# Patient Record
Sex: MALE | Race: WHITE | NOT HISPANIC OR LATINO | Employment: OTHER | URBAN - NONMETROPOLITAN AREA
[De-identification: names, ages, dates, MRNs, and addresses within clinical notes are randomized per-mention and may not be internally consistent; named-entity substitution may affect disease eponyms.]

---

## 2017-01-26 ENCOUNTER — OFFICE VISIT (OUTPATIENT)
Dept: GASTROENTEROLOGY | Facility: CLINIC | Age: 62
End: 2017-01-26

## 2017-01-26 VITALS
SYSTOLIC BLOOD PRESSURE: 124 MMHG | BODY MASS INDEX: 31.69 KG/M2 | WEIGHT: 234 LBS | HEIGHT: 72 IN | TEMPERATURE: 97.4 F | HEART RATE: 74 BPM | DIASTOLIC BLOOD PRESSURE: 74 MMHG

## 2017-01-26 DIAGNOSIS — Z79.01 ANTICOAGULATED: ICD-10-CM

## 2017-01-26 DIAGNOSIS — K63.5 COLON POLYPS: Primary | ICD-10-CM

## 2017-01-26 DIAGNOSIS — I10 ESSENTIAL HYPERTENSION: ICD-10-CM

## 2017-01-26 PROCEDURE — S0260 H&P FOR SURGERY: HCPCS | Performed by: NURSE PRACTITIONER

## 2017-01-26 NOTE — MR AVS SNAPSHOT
Lio Velasquez   1/26/2017 2:00 PM   Office Visit    Dept Phone:  [phone]   Encounter #:  [id number]    Provider:  RODGER Garnett   Department:  [facility] GASTROENTEROLOGY                Your Full Care Plan              Today's Medication Changes          These changes are accurate as of: 1/26/17  3:05 PM.  If you have any questions, ask your nurse or doctor.               New Medication(s)Ordered:     Sod Picosulfate-Mag Ox-Cit Acd 10-3.5-12 MG-GM-GM pack   Commonly known as:  PREPOPIK   Take as directed   Started by:  RODGER Garnett            Where to Get Your Medications      These medications were sent to [facility] [address] [facility] - [phone]  - [phone] [place] [address]     Phone:  [phone]     Sod Picosulfate-Mag Ox-Cit Acd 10-3.5-12 MG-GM-GM pack                  Your Updated Medication List          This list is accurate as of: 1/26/17  3:05 PM.  Always use your most recent med list.                aspirin 81 MG tablet       celecoxib 200 MG capsule   Commonly known as:  CeleBREX       * CENTRUM CARDIO PO       * MULTIVITAMIN ADULT PO       clopidogrel 75 MG tablet   Commonly known as:  PLAVIX       FOLTX 2.5-25-2 MG tablet tablet   Generic drug:  folic acid-pyridoxine-cyanocobalamin       metoprolol succinate XL 50 MG 24 hr tablet   Commonly known as:  TOPROL-XL       Omega 3 1000 MG capsule       ramipril 2.5 MG capsule   Commonly known as:  ALTACE       Sod Picosulfate-Mag Ox-Cit Acd 10-3.5-12 MG-GM-GM pack   Commonly known as:  PREPOPIK   Take as directed       TRINTELLIX 20 MG tablet   Generic drug:  Vortioxetine HBr       Unable to find       ZOCOR 40 MG tablet   Generic drug:  simvastatin       * Notice:  This list has 2 medication(s) that are the same as other medications prescribed for you. Read the directions carefully, and ask your doctor or other care provider to  "review them with you.            We Performed the Following     Case request       You Were Diagnosed With        Codes Comments    Colon polyps    -  Primary ICD-10-CM: K63.5  ICD-9-CM: 211.3     Anticoagulated     ICD-10-CM: Z79.01  ICD-9-CM: V58.61     Essential hypertension     ICD-10-CM: I10  ICD-9-CM: 401.9       Instructions     None    Patient Instructions History      Upcoming Appointments     Visit Type Date Time Department    SCOPE SCREENING 2017  2:00 PM [facility] GASTRO [facility]AD    FOLLOW UP 2017  8:45 AM [facility] HEART GROUP PAD      MyChart Signup     [facility] [employer] allows you to send messages to your doctor, view your test results, renew your prescriptions, schedule appointments, and more. To sign up, go to [employer] and click on the Sign Up Now link in the New User? box. Enter your [employer] Activation Code exactly as it appears below along with the last four digits of your Social Security Number and your Date of Birth () to complete the sign-up process. If you do not sign up before the expiration date, you must request a new code.    [employer] Activation Code: [email or web address]  Expires: 2017  3:05 PM    If you have questions, you can email [email or web address] or call [phone] to talk to our [employer] staff. Remember, [employer] is NOT to be used for urgent needs. For medical emergencies, dial 911.               Other Info from Your Visit           Your Appointments     2017  8:45 AM CST   Follow Up with Storm Encinas MD   [facility] GROUP (--)    [address] [place] [address] [address]  [facility] [address] [phone]           Arrive 15 minutes prior to appointment.              Allergies     Demerol [Meperidine]      Morphine And Related        Reason for Visit     Colon Polyps had colon 1230 polyps      Vital Signs     Blood Pressure Pulse Temperature Height Weight Body Mass Index    124/74 74 97.4 °F (36.3 °C) 72\" (182.9 cm) 234 lb (106 kg) " 31.74 kg/m2    Smoking Status                   Never Smoker           Problems and Diagnoses Noted     Colon polyp    -  Primary    Taking blood thinners        High blood pressure

## 2017-01-26 NOTE — PROGRESS NOTES
Chief Complaint   Patient presents with   • Colon Polyps     had colon 12-30-13 polyps     Subjective   HPI    Lio Velasquez is a 61 y.o. male who presents to office for preventative maintenance.  There is  a personal history of colon polyps.  There is not a history of colon cancer.  He does not have complaints of nausea/vomiting, change in bowels, weight loss, no BRBPR, no melena.  There is not a family history of colon cancer.  There is not a family history of colon polyps.  Pt last colonoscopy-12/2013 .  Bowels do move on regular basis.    Past Medical History   Diagnosis Date   • Aortocoronary bypass status    • CAD in native artery    • Chest pain    • Coronary angioplasty status    • Hyperlipidemia      unspecified   • Hypertension, benign    • Obesity    • Sleep apnea      unspecified     Outpatient Prescriptions Marked as Taking for the 1/26/17 encounter (Office Visit) with RODGER Garnett   Medication Sig Dispense Refill   • aspirin 81 MG tablet Take 81 mg by mouth every night.     • clopidogrel (PLAVIX) 75 MG tablet Take 75 mg by mouth every night.     • folic acid-pyridoxine-cyanocobalamin (FOLTX) 2.5-25-2 MG tablet tablet Take 1 tablet by mouth every night.     • metoprolol succinate XL (TOPROL-XL) 50 MG 24 hr tablet Take 50 mg by mouth every night.     • Multiple Vitamins-Minerals (CENTRUM CARDIO PO) Take 2 doses by mouth every night.     • Omega 3 1000 MG capsule Take  by mouth daily.     • ramipril (ALTACE) 2.5 MG capsule Take 2.5 mg by mouth every night.     • simvastatin (ZOCOR) 40 MG tablet Take 40 mg by mouth every night.     • Vortioxetine HBr (TRINTELLIX) 20 MG tablet Take  by mouth daily.       Allergies   Allergen Reactions   • Demerol [Meperidine]    • Morphine And Related      Social History     Social History   • Marital status:      Spouse name: N/A   • Number of children: N/A   • Years of education: N/A     Occupational History   • Not on file.     Social History Main  Topics   • Smoking status: Never Smoker   • Smokeless tobacco: Not on file   • Alcohol use Yes      Comment: occasional   • Drug use: No   • Sexual activity: Not on file     Other Topics Concern   • Not on file     Social History Narrative     Family History   Problem Relation Age of Onset   • Diabetes Mother      type II   • Heart failure Mother      congestive heart failure   • Colon polyps Neg Hx    • Esophageal cancer Neg Hx    • Rectal cancer Neg Hx      Review of Systems   Constitutional: Negative for fatigue, fever and unexpected weight change.   HENT: Negative for hearing loss, sore throat and voice change.    Eyes: Negative for visual disturbance.   Respiratory: Negative for cough, shortness of breath and wheezing.    Cardiovascular: Negative for chest pain and palpitations.   Gastrointestinal: Negative for abdominal pain, blood in stool and vomiting.   Endocrine: Negative for polydipsia and polyuria.   Genitourinary: Negative for difficulty urinating, dysuria, hematuria and urgency.   Musculoskeletal: Negative for joint swelling and myalgias.   Skin: Negative for color change, rash and wound.   Neurological: Negative for dizziness, tremors, seizures and syncope.   Hematological: Does not bruise/bleed easily.   Psychiatric/Behavioral: Negative for agitation and confusion. The patient is not nervous/anxious.      Objective   Vitals:    01/26/17 1419   BP: 124/74   Pulse: 74   Temp: 97.4 °F (36.3 °C)     Physical Exam   Constitutional: He is oriented to person, place, and time. He appears well-developed and well-nourished.   HENT:   Head: Normocephalic and atraumatic.   Eyes:   Pink, Nonicteric   Neck:   Global Assessment- supple. No JVD or lymphadenopathy   Cardiovascular: Normal rate, regular rhythm and normal heart sounds.  Exam reveals no gallop and no friction rub.    No murmur heard.  Pulmonary/Chest: Effort normal and breath sounds normal. No respiratory distress. He has no wheezes. He has no rales.    Inspection: Movements-Symmetrical   Abdominal: Soft. Bowel sounds are normal. He exhibits no distension and no mass. There is no tenderness. There is no rebound and no guarding.   Neurological: He is alert and oriented to person, place, and time.   General Exam-Deemed a reliable historian, able to converse without difficulty and Able to move all extremities without difficulty     Imaging Results (most recent)     None        Assessment/Plan   Lio was seen today for colon polyps.    Diagnoses and all orders for this visit:    Colon polyps  -     Case request  -     Sod Picosulfate-Mag Ox-Cit Acd (PREPOPIK) 10-3.5-12 MG-GM-GM pack; Take as directed    Anticoagulated    Essential hypertension      COLONOSCOPY WITH ANESTHESIA (N/A)      All risks, benefits, alternatives, and indications of colonoscopy procedure have been discussed with the patient. Risks to include perforation of the colon requiring possible surgery or colostomy, risk of bleeding from biopsies or removal of colon tissue, possibility of missing a colon polyp or cancer, or adverse drug reaction.  Benefits to include the diagnosis and management of disease of the colon and rectum. Alternatives to include barium enema, radiographic evaluation, lab testing or no intervention. Pt verbalizes understanding and agrees.     Patient is to hold their anticoagulation medication per the direction of their prescribing physician, Dr Encinas. This is to prevent any risk or complication from bleeding intra and post procedure. If they develop bleeding post procedure they are to go the emergency department for further evaluation and treatment immediately    Doubtful pt will be able to stop Plavix as he had a new cardiac stent placed in July 2016.      There are no Patient Instructions on file for this visit.     2/12/17    Pt unable to stop antiplatelet therapy due to BRIANNA in July 2016 per Dr Encinas.  Will place pt in recall for scope to be performed in 9/17

## 2017-01-26 NOTE — LETTER
January 26, 2017     Bridger Rosales MD  [address] [place] [address] [id number]  [place] [address]    Patient: Lio Velasquez   YOB: 1955   Date of Visit: 1/26/2017       Dear Dr. Bobby MD:    Lio Velasquez was in my office today. Below is a copy of my note.    If you have questions, please do not hesitate to call me. I look forward to following Lio along with you.         Sincerely,        RODGER Garnett        CC: No Recipients    Chief Complaint   Patient presents with   • Colon Polyps     had colon 12-30-13 polyps     Subjective   HPI    Lio Velasquez is a 61 y.o. male who presents to office for preventative maintenance.  There is  a personal history of colon polyps.  There is not a history of colon cancer.  He does not have complaints of nausea/vomiting, change in bowels, weight loss, no BRBPR, no melena.  There is not a family history of colon cancer.  There is not a family history of colon polyps.  Pt last colonoscopy-12/2013 .  Bowels do move on regular basis.    Past Medical History   Diagnosis Date   • Aortocoronary bypass status    • CAD in native artery    • Chest pain    • Coronary angioplasty status    • Hyperlipidemia      unspecified   • Hypertension, benign    • Obesity    • Sleep apnea      unspecified     Outpatient Prescriptions Marked as Taking for the 1/26/17 encounter (Office Visit) with RODGER Garnett   Medication Sig Dispense Refill   • aspirin 81 MG tablet Take 81 mg by mouth every night.     • clopidogrel (PLAVIX) 75 MG tablet Take 75 mg by mouth every night.     • folic acid-pyridoxine-cyanocobalamin (FOLTX) 2.5-25-2 MG tablet tablet Take 1 tablet by mouth every night.     • metoprolol succinate XL (TOPROL-XL) 50 MG 24 hr tablet Take 50 mg by mouth every night.     • Multiple Vitamins-Minerals (CENTRUM CARDIO PO) Take 2 doses by mouth every night.     • Omega 3 1000 MG capsule Take  by mouth daily.     • ramipril (ALTACE) 2.5 MG capsule Take 2.5 mg by mouth  every night.     • simvastatin (ZOCOR) 40 MG tablet Take 40 mg by mouth every night.     • Vortioxetine HBr (TRINTELLIX) 20 MG tablet Take  by mouth daily.       Allergies   Allergen Reactions   • Demerol [Meperidine]    • Morphine And Related      Social History     Social History   • Marital status:      Spouse name: N/A   • Number of children: N/A   • Years of education: N/A     Occupational History   • Not on file.     Social History Main Topics   • Smoking status: Never Smoker   • Smokeless tobacco: Not on file   • Alcohol use Yes      Comment: occasional   • Drug use: No   • Sexual activity: Not on file     Other Topics Concern   • Not on file     Social History Narrative     Family History   Problem Relation Age of Onset   • Diabetes Mother      type II   • Heart failure Mother      congestive heart failure   • Colon polyps Neg Hx    • Esophageal cancer Neg Hx    • Rectal cancer Neg Hx      Review of Systems   Constitutional: Negative for fatigue, fever and unexpected weight change.   HENT: Negative for hearing loss, sore throat and voice change.    Eyes: Negative for visual disturbance.   Respiratory: Negative for cough, shortness of breath and wheezing.    Cardiovascular: Negative for chest pain and palpitations.   Gastrointestinal: Negative for abdominal pain, blood in stool and vomiting.   Endocrine: Negative for polydipsia and polyuria.   Genitourinary: Negative for difficulty urinating, dysuria, hematuria and urgency.   Musculoskeletal: Negative for joint swelling and myalgias.   Skin: Negative for color change, rash and wound.   Neurological: Negative for dizziness, tremors, seizures and syncope.   Hematological: Does not bruise/bleed easily.   Psychiatric/Behavioral: Negative for agitation and confusion. The patient is not nervous/anxious.      Objective   Vitals:    01/26/17 1419   BP: 124/74   Pulse: 74   Temp: 97.4 °F (36.3 °C)     Physical Exam   Constitutional: He is oriented to person,  place, and time. He appears well-developed and well-nourished.   HENT:   Head: Normocephalic and atraumatic.   Eyes:   Pink, Nonicteric   Neck:   Global Assessment- supple. No JVD or lymphadenopathy   Cardiovascular: Normal rate, regular rhythm and normal heart sounds.  Exam reveals no gallop and no friction rub.    No murmur heard.  Pulmonary/Chest: Effort normal and breath sounds normal. No respiratory distress. He has no wheezes. He has no rales.   Inspection: Movements-Symmetrical   Abdominal: Soft. Bowel sounds are normal. He exhibits no distension and no mass. There is no tenderness. There is no rebound and no guarding.   Neurological: He is alert and oriented to person, place, and time.   General Exam-Deemed a reliable historian, able to converse without difficulty and Able to move all extremities without difficulty     Imaging Results (most recent)     None        Assessment/Plan   Lio was seen today for colon polyps.    Diagnoses and all orders for this visit:    Colon polyps  -     Case request  -     Sod Picosulfate-Mag Ox-Cit Acd (PREPOPIK) 10-3.5-12 MG-GM-GM pack; Take as directed    Anticoagulated    Essential hypertension      COLONOSCOPY WITH ANESTHESIA (N/A)      All risks, benefits, alternatives, and indications of colonoscopy procedure have been discussed with the patient. Risks to include perforation of the colon requiring possible surgery or colostomy, risk of bleeding from biopsies or removal of colon tissue, possibility of missing a colon polyp or cancer, or adverse drug reaction.  Benefits to include the diagnosis and management of disease of the colon and rectum. Alternatives to include barium enema, radiographic evaluation, lab testing or no intervention. Pt verbalizes understanding and agrees.     Patient is to hold their anticoagulation medication per the direction of their prescribing physician, Dr Encinas. This is to prevent any risk or complication from bleeding intra and post  procedure. If they develop bleeding post procedure they are to go the emergency department for further evaluation and treatment immediately    Doubtful pt will be able to stop Plavix as he had a new cardiac stent placed in July 2016.      There are no Patient Instructions on file for this visit.

## 2017-02-03 ENCOUNTER — TELEPHONE (OUTPATIENT)
Dept: CARDIOLOGY | Facility: CLINIC | Age: 62
End: 2017-02-03

## 2017-02-03 NOTE — TELEPHONE ENCOUNTER
PT REQUESTING COLONOSCOPY WITH DR UMAÑA ON 12/23    THIS PT HAS HX OF CABG & JUST STENTED 7/2016      PER OUR CONVERSATION - PT MAY NOT STOP PLAVIX UNTIL 8/2017    PT NOTIFIED

## 2017-02-12 ENCOUNTER — TELEPHONE (OUTPATIENT)
Dept: GASTROENTEROLOGY | Facility: CLINIC | Age: 62
End: 2017-02-12

## 2017-02-13 NOTE — TELEPHONE ENCOUNTER
Pt is unable to stop antiplatelet medication per Dr Encinas due to stent placement in July 2016.  Pt is scheduled for CScope at the end of Feb.  Will you please call him, cancel procedure, and place in recall for September.  He will not need another office visit.    kendra owen

## 2017-02-28 ENCOUNTER — OFFICE VISIT (OUTPATIENT)
Dept: CARDIOLOGY | Facility: CLINIC | Age: 62
End: 2017-02-28

## 2017-02-28 VITALS
BODY MASS INDEX: 32.18 KG/M2 | HEIGHT: 72 IN | WEIGHT: 237.6 LBS | SYSTOLIC BLOOD PRESSURE: 112 MMHG | HEART RATE: 69 BPM | DIASTOLIC BLOOD PRESSURE: 78 MMHG

## 2017-02-28 DIAGNOSIS — M19.90 ARTHRITIS: ICD-10-CM

## 2017-02-28 DIAGNOSIS — I25.10 CORONARY ARTERY DISEASE INVOLVING NATIVE CORONARY ARTERY OF NATIVE HEART WITHOUT ANGINA PECTORIS: Primary | ICD-10-CM

## 2017-02-28 DIAGNOSIS — E78.2 MIXED HYPERLIPIDEMIA: ICD-10-CM

## 2017-02-28 DIAGNOSIS — I10 ESSENTIAL HYPERTENSION: ICD-10-CM

## 2017-02-28 PROCEDURE — 99214 OFFICE O/P EST MOD 30 MIN: CPT | Performed by: INTERNAL MEDICINE

## 2017-02-28 PROCEDURE — 93000 ELECTROCARDIOGRAM COMPLETE: CPT | Performed by: INTERNAL MEDICINE

## 2017-02-28 RX ORDER — SIMVASTATIN 80 MG
80 TABLET ORAL NIGHTLY
Qty: 90 TABLET | Refills: 3 | Status: ON HOLD | OUTPATIENT
Start: 2017-02-28 | End: 2019-08-12

## 2017-02-28 NOTE — PROGRESS NOTES
Lio Velasquez  [id number]  1955  61 y.o.  male    Referring Provider: Bridger Rosales MD    Reason for Follow-up Visit: CAD S/P CABG X2 S/P stenting of SVG x2 last 7/16    Overall doing well  Denies any chest pain  No excessive shortness of breath  Compliant with medications    History of present illness:  Lio Velasquez is a 61 y.o. yo male with history of CAD who presents today for   Chief Complaint   Patient presents with   • Coronary Artery Disease     6 Month    .    History  Past Medical History   Diagnosis Date   • Aortocoronary bypass status    • CAD in native artery    • Chest pain    • Coronary angioplasty status    • Hyperlipidemia      unspecified   • Hypertension, benign    • Obesity    • Sleep apnea      unspecified   ,   Past Surgical History   Procedure Laterality Date   • Colonoscopy  12/30/2008   • Cardiac catheterization Left 03/05/2012 7/2016   • Coronary artery bypass graft       x2   • Ankle surgery       with screws    • Wrist surgery Left    • Shoulder surgery Right      with chano   ,   Family History   Problem Relation Age of Onset   • Diabetes Mother      type II   • Heart failure Mother      congestive heart failure   • Colon polyps Neg Hx    • Esophageal cancer Neg Hx    • Rectal cancer Neg Hx    ,   Social History   Substance Use Topics   • Smoking status: Never Smoker   • Smokeless tobacco: Never Used   • Alcohol use Yes      Comment: occasional   ,     Medications  Current Outpatient Prescriptions   Medication Sig Dispense Refill   • aspirin 81 MG tablet Take 81 mg by mouth every night.     • clopidogrel (PLAVIX) 75 MG tablet Take 75 mg by mouth every night.     • folic acid-pyridoxine-cyanocobalamin (FOLTX) 2.5-25-2 MG tablet tablet Take 1 tablet by mouth every night.     • metoprolol succinate XL (TOPROL-XL) 50 MG 24 hr tablet Take 50 mg by mouth every night.     • Multiple Vitamins-Minerals (CENTRUM CARDIO PO) Take 2 doses by mouth every night.     • Multiple  "Vitamins-Minerals (MULTIVITAMIN ADULT PO) Take  by mouth.     • ramipril (ALTACE) 2.5 MG capsule Take 2.5 mg by mouth every night.     • simvastatin (ZOCOR) 80 MG tablet Take 1 tablet by mouth Every Night. 90 tablet 3   • Unable to find Med Name: CPAP     • Vortioxetine HBr (TRINTELLIX) 20 MG tablet Take  by mouth daily.       No current facility-administered medications for this visit.        Allergies:  Demerol [meperidine] and Morphine and related    Review of Systems  Review of Systems   Constitution: Negative.   HENT: Negative.    Eyes: Negative.    Cardiovascular: Negative for chest pain, claudication, cyanosis, dyspnea on exertion, irregular heartbeat, leg swelling, near-syncope, orthopnea, palpitations, paroxysmal nocturnal dyspnea and syncope.   Respiratory: Negative.    Endocrine: Negative.    Hematologic/Lymphatic: Negative.    Skin: Negative.    Gastrointestinal: Negative for anorexia.   Genitourinary: Negative.    Neurological: Negative.    Psychiatric/Behavioral: Negative.        Objective     Physical Exam:  Visit Vitals   • /78 (BP Location: Left arm, Patient Position: Sitting, Cuff Size: Adult)   • Pulse 69   • Ht 72\" (182.9 cm)   • Wt 237 lb 9.6 oz (108 kg)   • BMI 32.22 kg/m2     Physical Exam   Constitutional: He appears well-developed.   HENT:   Head: Normocephalic.   Neck: Normal carotid pulses and no JVD present. No tracheal tenderness present. Carotid bruit is not present. No tracheal deviation and no edema present.   Cardiovascular: Regular rhythm, normal heart sounds and normal pulses.    Pulmonary/Chest: Effort normal. No stridor.   Abdominal: Soft.   Neurological: He is alert. He has normal strength. No cranial nerve deficit or sensory deficit.   Skin: Skin is warm.   Psychiatric: He has a normal mood and affect. His speech is normal and behavior is normal.       Results Review:       ECG 12 Lead  Date/Time: 2/28/2017 9:52 AM  Performed by: KENYA CORRALES  Authorized by: KENYA CORRALES "   Comparison: compared with previous ECG from 7/8/2016  Similar to previous ECG  Rhythm: sinus rhythm  Rate: normal  ST Segments: ST segments normal  T flattening: II, III and aVF  QRS axis: normal              Assessment/Plan   Patient Active Problem List   Diagnosis   • Coronary artery disease involving native coronary artery of native heart without angina pectoris   • Hyperlipemia   • Arthritis   • Essential hypertension       No palpitations. No significant pedal edema. Compliant with medications and diet. Latest labs and medications reviewed.    Plan:  Target LDL less than 70  May increase Zocor from 40 to 80 mg HS   See me in 1 year  Close follow up with you as scheduled.  Intensive factor modifications.  See order list.    Counseled regarding disease appropriate diet, fluid, caffeine, stimulants and sodium intake as well as importance of compliance to diet, exercise and regular follow up.  Avoid NSAIDS and COX2 inhibitors. Use Acetaminophen PRN.    Return in about 1 year (around 2/28/2018).

## 2017-03-01 ENCOUNTER — TELEPHONE (OUTPATIENT)
Dept: CARDIOLOGY | Facility: CLINIC | Age: 62
End: 2017-03-01

## 2017-03-01 NOTE — TELEPHONE ENCOUNTER
[facility] CALLED & STATED THAT THEY NEED AN 'OKAY' TO DISPENSE ZOCOR 80MG HS.    PHARMACIST SAYS THAT ACCORDING TO FDA THIS DOSAGE CAN CAUSE MUSCLE TOXICITY.      PLEASE ADVISE.

## 2017-04-19 ENCOUNTER — TRANSCRIBE ORDERS (OUTPATIENT)
Dept: ADMINISTRATIVE | Facility: HOSPITAL | Age: 62
End: 2017-04-19

## 2017-04-19 DIAGNOSIS — M19.072 PRIMARY LOCALIZED OSTEOARTHROSIS, ANKLE AND FOOT, LEFT: Primary | ICD-10-CM

## 2017-08-14 ENCOUNTER — HOSPITAL ENCOUNTER (OUTPATIENT)
Dept: CT IMAGING | Facility: HOSPITAL | Age: 62
Discharge: HOME | End: 2017-08-14
Admitting: ORTHOPAEDIC SURGERY

## 2017-08-14 DIAGNOSIS — M19.072 PRIMARY LOCALIZED OSTEOARTHROSIS, ANKLE AND FOOT, LEFT: ICD-10-CM

## 2017-08-14 PROCEDURE — 73700 CT LOWER EXTREMITY W/O DYE: CPT

## 2017-09-14 ENCOUNTER — TELEPHONE (OUTPATIENT)
Dept: CARDIOLOGY | Facility: CLINIC | Age: 62
End: 2017-09-14

## 2017-09-14 NOTE — TELEPHONE ENCOUNTER
NOÉ GREWAL AT [facility] IS REQUESTING CLEARANCE FOR ANKLE REPLACITENT. THIS IS SCHEDULED FOR  10/09/2017.     PT HAS ONE BYPASS, AND WAS LAST STENTED July 2016,      PT IS ON PLAVIX AND ASA    LOV WITH YOU WAS 02/28/17     PLEASE ADVISE

## 2017-09-26 ENCOUNTER — OFFICE VISIT (OUTPATIENT)
Dept: CARDIOLOGY | Facility: CLINIC | Age: 62
End: 2017-09-26

## 2017-09-26 VITALS
HEIGHT: 72 IN | SYSTOLIC BLOOD PRESSURE: 124 MMHG | HEART RATE: 56 BPM | DIASTOLIC BLOOD PRESSURE: 80 MMHG | WEIGHT: 250 LBS | BODY MASS INDEX: 33.86 KG/M2

## 2017-09-26 DIAGNOSIS — I10 ESSENTIAL HYPERTENSION: ICD-10-CM

## 2017-09-26 DIAGNOSIS — Z01.810 PREOP CARDIOVASCULAR EXAM: ICD-10-CM

## 2017-09-26 DIAGNOSIS — I25.10 CORONARY ARTERY DISEASE INVOLVING NATIVE CORONARY ARTERY OF NATIVE HEART WITHOUT ANGINA PECTORIS: Primary | ICD-10-CM

## 2017-09-26 DIAGNOSIS — M19.90 ARTHRITIS: ICD-10-CM

## 2017-09-26 DIAGNOSIS — E78.2 MIXED HYPERLIPIDEMIA: ICD-10-CM

## 2017-09-26 PROCEDURE — 99214 OFFICE O/P EST MOD 30 MIN: CPT | Performed by: INTERNAL MEDICINE

## 2017-09-26 NOTE — PROGRESS NOTES
Lio Velasquez  [id number]  1955  61 y.o.  male    Referring Provider: Bridger Rosales MD    Reason for Follow-up Visit: CAD S/P CABG X2 S/P stenting of SVG x2 last 7/16   Preoperative cardiovascular clearance for left ankle replacement Dr Noé Dias [facility]  Overall doing well  Denies any chest pain  Due to ankle pain unable cannot exert and therefore can assess functional capacity easily  Compliant with medications    History of present illness:  Loi Velasquez is a 61 y.o. yo male with history of CAD who presents today for   Chief Complaint   Patient presents with   • Coronary Artery Disease     7 MON FU    • SURGERY CLEARANCE     NOÉ DIAS [place] LEFT ANKLE   .    History  Past Medical History:   Diagnosis Date   • Aortocoronary bypass status    • CAD in native artery    • Chest pain    • Coronary angioplasty status    • Hyperlipidemia     unspecified   • Hypertension, benign    • Obesity    • Sleep apnea     unspecified   ,   Past Surgical History:   Procedure Laterality Date   • ANKLE SURGERY      with screws    • CARDIAC CATHETERIZATION Left 03/05/2012 7/2016   • COLONOSCOPY  12/30/2008   • CORONARY ARTERY BYPASS GRAFT  1993    x2   • CORONARY STENT PLACEMENT      X 3   • SHOULDER SURGERY Right     with chano   • WRIST SURGERY Left    ,   Family History   Problem Relation Age of Onset   • Diabetes Mother      type II   • Heart failure Mother      congestive heart failure   • Colon polyps Neg Hx    • Esophageal cancer Neg Hx    • Rectal cancer Neg Hx    ,   Social History   Substance Use Topics   • Smoking status: Never Smoker   • Smokeless tobacco: Never Used   • Alcohol use Yes      Comment: occasional   ,     Medications  Current Outpatient Prescriptions   Medication Sig Dispense Refill   • aspirin 81 MG tablet Take 81 mg by mouth every night.     • clopidogrel (PLAVIX) 75 MG tablet Take 75 mg by mouth every night.     • folic acid-pyridoxine-cyanocobalamin (FOLTX) 2.5-25-2  "MG tablet tablet Take 1 tablet by mouth every night.     • metoprolol succinate XL (TOPROL-XL) 50 MG 24 hr tablet Take 50 mg by mouth every night.     • Multiple Vitamins-Minerals (CENTRUM CARDIO PO) Take 2 doses by mouth every night.     • ramipril (ALTACE) 2.5 MG capsule Take 2.5 mg by mouth every night.     • simvastatin (ZOCOR) 80 MG tablet Take 1 tablet by mouth Every Night. 90 tablet 3   • Unable to find Med Name: CPAP     • Vortioxetine HBr (TRINTELLIX) 20 MG tablet Take  by mouth daily.       No current facility-administered medications for this visit.        Allergies:  Demerol [meperidine] and Morphine and related    Review of Systems  Review of Systems   Constitution: Negative.   HENT: Negative.    Eyes: Negative.    Cardiovascular: Negative for chest pain, claudication, cyanosis, dyspnea on exertion, irregular heartbeat, leg swelling, near-syncope, orthopnea, palpitations, paroxysmal nocturnal dyspnea and syncope.   Respiratory: Negative.    Endocrine: Negative.    Hematologic/Lymphatic: Negative.    Skin: Negative.    Gastrointestinal: Negative for anorexia.   Genitourinary: Negative.    Neurological: Negative.    Psychiatric/Behavioral: Negative.        Objective     Physical Exam:  /80  Pulse 56  Ht 72\" (182.9 cm)  Wt 250 lb (113 kg)  BMI 33.91 kg/m2  Physical Exam   Constitutional: He appears well-developed.   HENT:   Head: Normocephalic.   Neck: Normal carotid pulses and no JVD present. No tracheal tenderness present. Carotid bruit is not present. No tracheal deviation and no edema present.   Cardiovascular: Regular rhythm, normal heart sounds and normal pulses.    Pulmonary/Chest: Effort normal. No stridor.   Abdominal: Soft.   Neurological: He is alert. He has normal strength. No cranial nerve deficit or sensory deficit.   Skin: Skin is warm.   Psychiatric: He has a normal mood and affect. His speech is normal and behavior is normal.       Results " Review:     Procedures    Assessment/Plan   Patient Active Problem List   Diagnosis   • Coronary artery disease involving native coronary artery of native heart without angina pectoris   • Hyperlipemia   • Arthritis   • Essential hypertension   • Preop cardiovascular exam left ankle replacement       No palpitations. No significant pedal edema in right unaffected ankle. Ankle swelling in left ankle. Compliant with medications and diet. Latest labs and medications reviewed.    Plan:      Dobutamine stress echo as cannot assess functional capaicty  Known coronary artery disease S/P Coronary artery bypass grafting and Stented coronary artery.   Monitor for any signs of bleeding including red or dark stools. Fall precautions.   Don't stop Aspirin as high risk of stent thrombosis, if the surgeon is unable to do surgery while he takes Aspirin then consider Lovenox bridging  Close follow up with you as scheduled.  Intensive factor modifications.  See order list.    Counseled regarding disease appropriate diet, fluid, caffeine, stimulants and sodium intake as well as importance of compliance to diet, exercise and regular follow up.  Avoid NSAIDS and COX2 inhibitors. Use Acetaminophen PRN.    Return in about 6 days (around 10/2/2017).

## 2017-09-29 ENCOUNTER — HOSPITAL ENCOUNTER (OUTPATIENT)
Dept: CARDIOLOGY | Facility: HOSPITAL | Age: 62
Discharge: HOME | End: 2017-09-29
Attending: INTERNAL MEDICINE | Admitting: INTERNAL MEDICINE

## 2017-09-29 VITALS — SYSTOLIC BLOOD PRESSURE: 134 MMHG | HEART RATE: 54 BPM | DIASTOLIC BLOOD PRESSURE: 77 MMHG

## 2017-09-29 DIAGNOSIS — I25.10 CORONARY ARTERY DISEASE INVOLVING NATIVE CORONARY ARTERY OF NATIVE HEART WITHOUT ANGINA PECTORIS: ICD-10-CM

## 2017-09-29 PROCEDURE — 93350 STRESS TTE ONLY: CPT

## 2017-09-29 PROCEDURE — 93017 CV STRESS TEST TRACING ONLY: CPT

## 2017-09-29 PROCEDURE — 93350 STRESS TTE ONLY: CPT | Performed by: INTERNAL MEDICINE

## 2017-09-29 PROCEDURE — 25010000003 DOBUTAMINE PER 250 MG: Performed by: INTERNAL MEDICINE

## 2017-09-29 PROCEDURE — 25010000002 PERFLUTREN 6.52 MG/ML SUSPENSION: Performed by: INTERNAL MEDICINE

## 2017-09-29 PROCEDURE — 93352 ADMIN ECG CONTRAST AGENT: CPT | Performed by: INTERNAL MEDICINE

## 2017-09-29 PROCEDURE — 93018 CV STRESS TEST I&R ONLY: CPT | Performed by: INTERNAL MEDICINE

## 2017-09-29 RX ORDER — DOBUTAMINE HYDROCHLORIDE 100 MG/100ML
10-50 INJECTION INTRAVENOUS CONTINUOUS
Status: DISCONTINUED | OUTPATIENT
Start: 2017-09-29 | End: 2017-09-30 | Stop reason: HOSPADM

## 2017-09-29 RX ORDER — ATROPINE SULFATE 1 MG/ML
0.3 INJECTION, SOLUTION INTRAMUSCULAR; INTRAVENOUS; SUBCUTANEOUS ONCE
Status: COMPLETED | OUTPATIENT
Start: 2017-09-29 | End: 2017-09-29

## 2017-09-29 RX ADMIN — DOBUTAMINE 30 MCG/KG/MIN: 12.5 INJECTION, SOLUTION, CONCENTRATE INTRAVENOUS at 13:11

## 2017-09-29 RX ADMIN — ATROPINE SULFATE 0.3 MG: 1 INJECTION, SOLUTION INTRAMUSCULAR; INTRAVENOUS; SUBCUTANEOUS at 13:23

## 2017-09-29 RX ADMIN — PERFLUTREN 8.48 MG: 6.52 INJECTION, SUSPENSION INTRAVENOUS at 13:22

## 2017-09-30 LAB
BH CV STRESS BP STAGE 1: NORMAL
BH CV STRESS BP STAGE 2: NORMAL
BH CV STRESS BP STAGE 3: NORMAL
BH CV STRESS DOB - ATROPINE STAGE 3: 0.3
BH CV STRESS DOSE DOBUTAMINE STAGE 1: 10
BH CV STRESS DOSE DOBUTAMINE STAGE 2: 20
BH CV STRESS DOSE DOBUTAMINE STAGE 3: 30
BH CV STRESS DURATION MIN STAGE 1: 3
BH CV STRESS DURATION MIN STAGE 2: 3
BH CV STRESS DURATION MIN STAGE 3: 2
BH CV STRESS DURATION SEC STAGE 1: 0
BH CV STRESS DURATION SEC STAGE 2: 0
BH CV STRESS DURATION SEC STAGE 3: 31
BH CV STRESS ECHO POST STRESS EJECTION FRACTION EF: 65 %
BH CV STRESS HR STAGE 1: 58
BH CV STRESS HR STAGE 2: 83
BH CV STRESS HR STAGE 3: 137
BH CV STRESS PROTOCOL 1: NORMAL
BH CV STRESS RECOVERY BP: NORMAL MMHG
BH CV STRESS RECOVERY HR: 85 BPM
BH CV STRESS STAGE 1: 1
BH CV STRESS STAGE 2: 2
BH CV STRESS STAGE 3: 3
LV EF 2D ECHO EST: 55 %
MAXIMAL PREDICTED HEART RATE: 160 BPM
PERCENT MAX PREDICTED HR: 85.63 %
STRESS BASELINE BP: NORMAL MMHG
STRESS BASELINE HR: 54 BPM
STRESS PERCENT HR: 101 %
STRESS POST EXERCISE DUR MIN: 8 MIN
STRESS POST EXERCISE DUR SEC: 31 SEC
STRESS POST PEAK BP: NORMAL MMHG
STRESS POST PEAK HR: 137 BPM
STRESS TARGET HR: 136 BPM

## 2017-10-02 ENCOUNTER — OFFICE VISIT (OUTPATIENT)
Dept: CARDIOLOGY | Facility: CLINIC | Age: 62
End: 2017-10-02

## 2017-10-02 VITALS
WEIGHT: 244 LBS | HEART RATE: 60 BPM | BODY MASS INDEX: 33.05 KG/M2 | HEIGHT: 72 IN | DIASTOLIC BLOOD PRESSURE: 80 MMHG | OXYGEN SATURATION: 98 % | SYSTOLIC BLOOD PRESSURE: 120 MMHG

## 2017-10-02 DIAGNOSIS — I10 ESSENTIAL HYPERTENSION: ICD-10-CM

## 2017-10-02 DIAGNOSIS — Z01.810 PREOP CARDIOVASCULAR EXAM: ICD-10-CM

## 2017-10-02 DIAGNOSIS — I25.10 CORONARY ARTERY DISEASE INVOLVING NATIVE CORONARY ARTERY OF NATIVE HEART WITHOUT ANGINA PECTORIS: Primary | ICD-10-CM

## 2017-10-02 DIAGNOSIS — M19.90 ARTHRITIS: ICD-10-CM

## 2017-10-02 DIAGNOSIS — E78.2 MIXED HYPERLIPIDEMIA: ICD-10-CM

## 2017-10-02 PROCEDURE — 99214 OFFICE O/P EST MOD 30 MIN: CPT | Performed by: INTERNAL MEDICINE

## 2017-10-02 NOTE — PROGRESS NOTES
Lio Velasquez  [id number]  1955  61 y.o.  male    Referring Provider: Bridger Rosales MD    Reason for Follow-up Visit: Here for follow up after dobutamine stress echo  CAD S/P CABG X2 S/P stenting of SVG x2 last 7/16  Preoperative cardiovascular clearance for left ankle replacement [facility]  Overall doing well  Denies any chest pain  Due to ankle pain unable cannot exert and therefore can assess functional capacity easily hence had dobutamine stress echo  Compliant with medications    History of present illness:  Lio Velasquez is a 61 y.o. yo male with history of CAD who presents today for   Chief Complaint   Patient presents with   • Coronary Artery Disease     1 WK FU    • SURGERY CLEARACE     left ankle replacement Dr Alanna Rojas    .    History  Past Medical History:   Diagnosis Date   • Aortocoronary bypass status    • CAD in native artery    • Chest pain    • Coronary angioplasty status    • Hyperlipidemia     unspecified   • Hypertension, benign    • Obesity    • Sleep apnea     unspecified   ,   Past Surgical History:   Procedure Laterality Date   • ANKLE SURGERY      with screws    • CARDIAC CATHETERIZATION Left 03/05/2012 7/2016   • COLONOSCOPY  12/30/2008   • CORONARY ARTERY BYPASS GRAFT  1993    x2   • CORONARY STENT PLACEMENT      X 3   • SHOULDER SURGERY Right     with chano   • WRIST SURGERY Left    ,   Family History   Problem Relation Age of Onset   • Diabetes Mother      type II   • Heart failure Mother      congestive heart failure   • Colon polyps Neg Hx    • Esophageal cancer Neg Hx    • Rectal cancer Neg Hx    ,   Social History   Substance Use Topics   • Smoking status: Never Smoker   • Smokeless tobacco: Never Used   • Alcohol use Yes      Comment: occasional   ,     Medications  Current Outpatient Prescriptions   Medication Sig Dispense Refill   • aspirin 81 MG tablet Take 81 mg by mouth every night.     • clopidogrel (PLAVIX) 75 MG  "tablet Take 75 mg by mouth every night.     • folic acid-pyridoxine-cyanocobalamin (FOLTX) 2.5-25-2 MG tablet tablet Take 1 tablet by mouth every night.     • metoprolol succinate XL (TOPROL-XL) 50 MG 24 hr tablet Take 50 mg by mouth every night.     • Multiple Vitamins-Minerals (CENTRUM CARDIO PO) Take 2 doses by mouth every night.     • ramipril (ALTACE) 2.5 MG capsule Take 2.5 mg by mouth every night.     • simvastatin (ZOCOR) 80 MG tablet Take 1 tablet by mouth Every Night. 90 tablet 3   • Unable to find Med Name: CPAP     • Vortioxetine HBr (TRINTELLIX) 20 MG tablet Take  by mouth daily.       No current facility-administered medications for this visit.        Allergies:  Demerol [meperidine] and Morphine and related    Review of Systems  Review of Systems   Constitution: Negative.   HENT: Negative.    Eyes: Negative.    Cardiovascular: Negative for chest pain, claudication, cyanosis, dyspnea on exertion, irregular heartbeat, leg swelling, near-syncope, orthopnea, palpitations, paroxysmal nocturnal dyspnea and syncope.   Respiratory: Negative.    Endocrine: Negative.    Hematologic/Lymphatic: Negative.    Skin: Negative.    Gastrointestinal: Negative for anorexia.   Genitourinary: Negative.    Neurological: Negative.    Psychiatric/Behavioral: Negative.        Objective     Physical Exam:  /80  Pulse 60  Ht 72\" (182.9 cm)  Wt 244 lb (111 kg)  SpO2 98%  BMI 33.09 kg/m2  Physical Exam   Constitutional: He appears well-developed.   HENT:   Head: Normocephalic.   Neck: Normal carotid pulses and no JVD present. No tracheal tenderness present. Carotid bruit is not present. No tracheal deviation and no edema present.   Cardiovascular: Regular rhythm, normal heart sounds and normal pulses.    Pulmonary/Chest: Effort normal. No stridor.   Abdominal: Soft.   Neurological: He is alert. He has normal strength. No cranial nerve deficit or sensory deficit.   Skin: Skin is warm.   Psychiatric: He has a normal mood " and affect. His speech is normal and behavior is normal.       Results Review:     Procedures    Assessment/Plan   Patient Active Problem List   Diagnosis   • Coronary artery disease involving native coronary artery of native heart without angina pectoris   • Hyperlipemia   • Arthritis   • Essential hypertension   • Preop cardiovascular exam left ankle replacement       No palpitations. No significant pedal edema in right unaffected ankle. Ankle swelling in left ankle. Compliant with medications and diet. Latest labs and medications reviewed.  Low risk stress echo with normal LVEF      Plan:    Acceptable cardiovascular risk of surgery.  Can proceed with surgery with usual caution and perioperative hemodynamic and cardiac rhythm monitoring.    Don't stop Aspirin as high risk of stent thrombosis, if the surgeon is unable to do surgery while he takes Aspirin then consider Lovenox bridging  OK to stop Plavix upto 5 days prior to surgery resume when OK with surgery  Close follow up with you as scheduled.  Intensive factor modifications.  See order list.    Counseled regarding disease appropriate diet, fluid, caffeine, stimulants and sodium intake as well as importance of compliance to diet, exercise and regular follow up.  Avoid NSAIDS and COX2 inhibitors. Use Acetaminophen PRN.    Return in about 6 months (around 4/2/2018).

## 2017-10-03 ENCOUNTER — APPOINTMENT (OUTPATIENT)
Dept: CARDIOLOGY | Facility: HOSPITAL | Age: 62
End: 2017-10-03
Attending: INTERNAL MEDICINE

## 2019-04-26 ENCOUNTER — HOSPITAL ENCOUNTER (OUTPATIENT)
Dept: MRI IMAGING | Age: 64
Discharge: HOME | End: 2019-04-26
Payer: COMMERCIAL

## 2019-04-26 DIAGNOSIS — M54.5 LOW BACK PAIN, UNSPECIFIED BACK PAIN LATERALITY, UNSPECIFIED CHRONICITY, WITH SCIATICA PRESENCE UNSPECIFIED: ICD-10-CM

## 2019-04-26 LAB
GFR NON-AFRICAN AMERICAN: >60
PERFORMED ON: NORMAL
POC CREATININE: 0.9 MG/DL (ref 0.3–1.3)
POC SAMPLE TYPE: NORMAL

## 2019-04-26 PROCEDURE — 6360000004 HC RX CONTRAST MEDICATION: Performed by: FAMILY MEDICINE

## 2019-04-26 PROCEDURE — 72158 MRI LUMBAR SPINE W/O & W/DYE: CPT

## 2019-04-26 PROCEDURE — A9577 INJ MULTIHANCE: HCPCS | Performed by: FAMILY MEDICINE

## 2019-04-26 PROCEDURE — 82565 ASSAY OF CREATININE: CPT

## 2019-04-26 RX ADMIN — GADOBENATE DIMEGLUMINE 20 ML: 529 INJECTION, SOLUTION INTRAVENOUS at 17:37

## 2019-05-28 NOTE — PROGRESS NOTES
Chief Complaint   Patient presents with   • Colonoscopy     12-30-13 colon 2 polyps 3 year recall       PCP: Bridger Rosales MD  REFER: No ref. provider found    Subjective     HPI    Lio Velasquez is a 63 y.o. male who presents to office for preventative maintenance.  There is  a personal history of colon polyps.  There is not a history of colon cancer.  He does not have complaints of nausea/vomiting, change in bowels, weight loss, no BRBPR, no melena.  There is not a family history of colon cancer.  There is not a family history of colon polyps.  He last colonoscopy-2013 .  Bowels do move on regular basis.    CScope (Dr Jaramillo) 12/2013-hyperplastic polyp x 2 - sigmoid colon    Past Medical History:   Diagnosis Date   • Aortocoronary bypass status    • CAD in native artery    • Chest pain    • Coronary angioplasty status    • Hyperlipidemia     unspecified   • Hypertension, benign    • Obesity    • Sleep apnea     unspecified     Past Surgical History:   Procedure Laterality Date   • ANKLE SURGERY      with screws    • CARDIAC CATHETERIZATION Left 03/05/2012 7/2016   • COLONOSCOPY  12/30/2008   • COLONOSCOPY  12/30/2013    two polyps removed from the sigmoid colon   • CORONARY ARTERY BYPASS GRAFT  1993    x2   • CORONARY STENT PLACEMENT      X 3   • SHOULDER SURGERY Right     with chano   • WRIST SURGERY Left      Outpatient Medications Marked as Taking for the 5/29/19 encounter (Office Visit) with Gerhard Balbuena APRN   Medication Sig Dispense Refill   • aspirin 81 MG tablet Take 81 mg by mouth every night.     • clopidogrel (PLAVIX) 75 MG tablet Take 75 mg by mouth every night.     • folic acid-pyridoxine-cyanocobalamin (FOLTX) 2.5-25-2 MG tablet tablet Take 1 tablet by mouth every night.     • metoprolol succinate XL (TOPROL-XL) 50 MG 24 hr tablet Take 50 mg by mouth every night.     • Multiple Vitamins-Minerals (CENTRUM CARDIO PO) Take 2 doses by mouth every night.     • ramipril (ALTACE) 2.5 MG  capsule Take 2.5 mg by mouth every night.     • simvastatin (ZOCOR) 80 MG tablet Take 1 tablet by mouth Every Night. 90 tablet 3   • Vortioxetine HBr (TRINTELLIX) 20 MG tablet Take  by mouth daily.       Allergies   Allergen Reactions   • Demerol [Meperidine]    • Morphine And Related      Social History     Socioeconomic History   • Marital status:      Spouse name: Not on file   • Number of children: Not on file   • Years of education: Not on file   • Highest education level: Not on file   Tobacco Use   • Smoking status: Never Smoker   • Smokeless tobacco: Never Used   Substance and Sexual Activity   • Alcohol use: Yes     Comment: occasional   • Drug use: No   • Sexual activity: Defer     Family History   Problem Relation Age of Onset   • Diabetes Mother         type II   • Heart failure Mother         congestive heart failure   • Colon polyps Neg Hx    • Esophageal cancer Neg Hx    • Rectal cancer Neg Hx    • Colon cancer Neg Hx      Review of Systems   Constitutional: Negative for fatigue, fever and unexpected weight change.   HENT: Negative for hearing loss, sore throat and voice change.    Eyes: Negative for visual disturbance.   Respiratory: Negative for cough, shortness of breath and wheezing.    Cardiovascular: Negative for chest pain and palpitations.   Gastrointestinal: Negative for abdominal pain, blood in stool and vomiting.   Endocrine: Negative for polydipsia and polyuria.   Genitourinary: Negative for difficulty urinating, dysuria, hematuria and urgency.   Musculoskeletal: Negative for joint swelling and myalgias.   Skin: Negative for color change, rash and wound.   Neurological: Negative for dizziness, tremors, seizures and syncope.   Hematological: Does not bruise/bleed easily.   Psychiatric/Behavioral: Negative for agitation and confusion. The patient is not nervous/anxious.      Objective   Vitals:    05/29/19 0926   BP: 130/76   Pulse: 68   Temp: 97 °F (36.1 °C)   SpO2: 99%     Physical  Exam   Constitutional: He is oriented to person, place, and time. He appears well-developed and well-nourished. He is cooperative.   HENT:   Head: Normocephalic and atraumatic.   Eyes: Conjunctivae are normal. Pupils are equal, round, and reactive to light. No scleral icterus.   Neck: Normal range of motion. Neck supple. No JVD present. No thyroid mass and no thyromegaly present.   Cardiovascular: Normal rate, regular rhythm and normal heart sounds. Exam reveals no gallop and no friction rub.   No murmur heard.  Pulmonary/Chest: Effort normal and breath sounds normal. No accessory muscle usage. No respiratory distress. He has no wheezes. He has no rales.   Abdominal: Soft. Normal appearance and bowel sounds are normal. He exhibits no distension, no ascites and no mass. There is no hepatosplenomegaly. There is no tenderness. There is no rebound and no guarding.   Musculoskeletal: Normal range of motion. He exhibits no edema or tenderness.     Vascular Status -  His right foot exhibits normal foot vasculature  and no edema. His left foot exhibits normal foot vasculature  and no edema.  Lymphadenopathy:     He has no cervical adenopathy.   Neurological: He is alert and oriented to person, place, and time. He has normal strength. Gait normal.   Skin: Skin is warm, dry and intact. No rash noted.     Imaging Results (most recent)     None        Body mass index is 33.91 kg/m².    Assessment/Plan   Lio was seen today for colonoscopy.    Diagnoses and all orders for this visit:    History of colon polyps  -     Case Request; Standing  -     Implement Anesthesia Orders Day of Procedure; Standing  -     Obtain Informed Consent; Standing  -     Case Request    Anticoagulated    Other orders  -     Cancel: CLENPIQ 10-3.5-12 MG-GM -GM/160ML solution; Take 1 each by mouth Take As Directed.  -     Cancel: Case Request; Standing  -     Cancel: Implement Anesthesia Orders Day of Procedure; Standing  -     Cancel: Obtain Informed  Consent; Standing      COLONOSCOPY WITH ANESTHESIA (N/A)    Will need to contact pt to schedule procedure once clearance from Dr Encinas has been received.  Case discussed with Dr Jaramillo     Advised pt to stop ASA, use of NSAIDs, Fish Oil, and MV 5 days prior to procedure, per Dr Jaramillo protocol.  Tylenol based products are ok to take.  Pt verbalized understanding.    Patient is to continue all blood pressure and cardiac medications prior to procedure and has been advised to take medications morning of procedure   Pt verbalized understanding    Patient is to hold their anticoagulation medication per the direction of their prescribing physician, Dr Encinas. This is to prevent any risk or complication from bleeding intra and post procedure. If they develop bleeding post procedure they are to go the emergency department for further evaluation and treatment immediately    All risks, benefits, alternatives, and indications of colonoscopy procedure have been discussed with the patient. Risks to include perforation of the colon requiring possible surgery or colostomy, risk of bleeding from biopsies or removal of colon tissue, possibility of missing a colon polyp or cancer, or adverse drug reaction.  Benefits to include the diagnosis and management of disease of the colon and rectum. Alternatives to include barium enema, radiographic evaluation, lab testing or no intervention. He verbalizes understanding and agrees.     Patient's Body mass index is 33.91 kg/m². BMI is above normal parameters. Recommendations include: no follow up.      There are no Patient Instructions on file for this visit.

## 2019-05-29 ENCOUNTER — OFFICE VISIT (OUTPATIENT)
Dept: GASTROENTEROLOGY | Facility: CLINIC | Age: 64
End: 2019-05-29

## 2019-05-29 VITALS
WEIGHT: 250 LBS | HEIGHT: 72 IN | TEMPERATURE: 97 F | BODY MASS INDEX: 33.86 KG/M2 | OXYGEN SATURATION: 99 % | DIASTOLIC BLOOD PRESSURE: 76 MMHG | HEART RATE: 68 BPM | SYSTOLIC BLOOD PRESSURE: 130 MMHG

## 2019-05-29 DIAGNOSIS — Z79.01 ANTICOAGULATED: ICD-10-CM

## 2019-05-29 DIAGNOSIS — Z86.010 HISTORY OF COLON POLYPS: Primary | ICD-10-CM

## 2019-05-29 PROCEDURE — S0285 CNSLT BEFORE SCREEN COLONOSC: HCPCS | Performed by: NURSE PRACTITIONER

## 2019-05-29 RX ORDER — SODIUM PICOSULFATE, MAGNESIUM OXIDE, AND ANHYDROUS CITRIC ACID 10; 3.5; 12 MG/160ML; G/160ML; G/160ML
1 LIQUID ORAL TAKE AS DIRECTED
Qty: 320 ML | Refills: 0 | Status: CANCELLED | OUTPATIENT
Start: 2019-05-29

## 2019-05-31 ENCOUNTER — TELEPHONE (OUTPATIENT)
Dept: CARDIOLOGY | Facility: CLINIC | Age: 64
End: 2019-05-31

## 2019-05-31 ENCOUNTER — TELEPHONE (OUTPATIENT)
Dept: GASTROENTEROLOGY | Facility: CLINIC | Age: 64
End: 2019-05-31

## 2019-05-31 NOTE — TELEPHONE ENCOUNTER
Once clearance has been received to hold anticoag please contact pt to schedule time/date of procedure    We can use clenpiq, that will have to be sent in when procedure scheduled, case request has been entered    Thank you

## 2019-05-31 NOTE — TELEPHONE ENCOUNTER
PATIENT IS BEING CONSIDERED FOR A COLONOSCOPY WITH  HE IS ON PLAVIX, WE HAVEN'T SEEN PATIENT SINCE 10/02/2017, SPOKE WITH TYRESE AND LET HER KNOW WE HAVEN'T SEEN THE PATIENT SHE ASKED IF WE HAVE BEEN REFILLING HIS PLAVIX LOOKING IN THE CHART WE HAVE NOT.

## 2019-05-31 NOTE — TELEPHONE ENCOUNTER
B[facility] Patient has not seen Dr. Encinas since 2017 - They will not give clearance until they see patient in the office.     Called patient to let him know and he stated Dr. Rosales refills my plavix ?     Instructed patient to follow up with Dr. Encinas - He stated he would try.

## 2019-06-03 ENCOUNTER — TELEPHONE (OUTPATIENT)
Dept: GASTROENTEROLOGY | Facility: CLINIC | Age: 64
End: 2019-06-03

## 2019-06-03 RX ORDER — SODIUM PICOSULFATE, MAGNESIUM OXIDE, AND ANHYDROUS CITRIC ACID 10; 3.5; 12 MG/160ML; G/160ML; G/160ML
1 LIQUID ORAL TAKE AS DIRECTED
Qty: 320 ML | Refills: 0 | Status: ON HOLD | OUTPATIENT
Start: 2019-06-03 | End: 2019-08-12

## 2019-06-03 NOTE — TELEPHONE ENCOUNTER
Schedule Colonoscopy 06/21/2019 at 9:15am     Please send Clenpiq to [facility]

## 2019-06-04 PROBLEM — Z86.010 HISTORY OF COLON POLYPS: Status: ACTIVE | Noted: 2019-06-04

## 2019-06-04 PROBLEM — Z86.0100 HISTORY OF COLON POLYPS: Status: ACTIVE | Noted: 2019-06-04

## 2019-06-21 ENCOUNTER — ANESTHESIA EVENT (OUTPATIENT)
Dept: GASTROENTEROLOGY | Facility: HOSPITAL | Age: 64
End: 2019-06-21

## 2019-06-21 ENCOUNTER — ANESTHESIA (OUTPATIENT)
Dept: GASTROENTEROLOGY | Facility: HOSPITAL | Age: 64
End: 2019-06-21

## 2019-06-21 ENCOUNTER — HOSPITAL ENCOUNTER (OUTPATIENT)
Facility: HOSPITAL | Age: 64
Setting detail: HOSPITAL OUTPATIENT SURGERY
Discharge: HOME | End: 2019-06-21
Attending: INTERNAL MEDICINE | Admitting: INTERNAL MEDICINE

## 2019-06-21 VITALS
HEIGHT: 72 IN | WEIGHT: 251 LBS | RESPIRATION RATE: 16 BRPM | HEART RATE: 66 BPM | TEMPERATURE: 97.1 F | OXYGEN SATURATION: 94 % | SYSTOLIC BLOOD PRESSURE: 130 MMHG | DIASTOLIC BLOOD PRESSURE: 72 MMHG | BODY MASS INDEX: 34 KG/M2

## 2019-06-21 DIAGNOSIS — Z86.010 HISTORY OF COLON POLYPS: ICD-10-CM

## 2019-06-21 PROCEDURE — G0105 COLORECTAL SCRN; HI RISK IND: HCPCS | Performed by: INTERNAL MEDICINE

## 2019-06-21 PROCEDURE — 25010000002 PROPOFOL 10 MG/ML EMULSION: Performed by: NURSE ANESTHETIST, CERTIFIED REGISTERED

## 2019-06-21 RX ORDER — LIDOCAINE HYDROCHLORIDE 20 MG/ML
INJECTION, SOLUTION INFILTRATION; PERINEURAL AS NEEDED
Status: DISCONTINUED | OUTPATIENT
Start: 2019-06-21 | End: 2019-06-21 | Stop reason: SURG

## 2019-06-21 RX ORDER — SODIUM CHLORIDE 0.9 % (FLUSH) 0.9 %
3 SYRINGE (ML) INJECTION AS NEEDED
Status: DISCONTINUED | OUTPATIENT
Start: 2019-06-21 | End: 2019-06-21 | Stop reason: HOSPADM

## 2019-06-21 RX ORDER — SODIUM CHLORIDE 9 MG/ML
500 INJECTION, SOLUTION INTRAVENOUS CONTINUOUS PRN
Status: DISCONTINUED | OUTPATIENT
Start: 2019-06-21 | End: 2019-06-21 | Stop reason: HOSPADM

## 2019-06-21 RX ORDER — PROPOFOL 10 MG/ML
VIAL (ML) INTRAVENOUS AS NEEDED
Status: DISCONTINUED | OUTPATIENT
Start: 2019-06-21 | End: 2019-06-21 | Stop reason: SURG

## 2019-06-21 RX ADMIN — SODIUM CHLORIDE 500 ML: 9 INJECTION, SOLUTION INTRAVENOUS at 09:59

## 2019-06-21 RX ADMIN — LIDOCAINE HYDROCHLORIDE 50 MG: 20 INJECTION, SOLUTION INFILTRATION; PERINEURAL at 10:20

## 2019-06-21 RX ADMIN — PROPOFOL 200 MG: 10 INJECTION, EMULSION INTRAVENOUS at 10:21

## 2019-06-21 NOTE — ANESTHESIA PREPROCEDURE EVALUATION
Anesthesia Evaluation     Patient summary reviewed   no history of anesthetic complications:  NPO Solid Status: > 8 hours  NPO Liquid Status: > 4 hours           Airway   Mallampati: II  TM distance: >3 FB  Neck ROM: full  No difficulty expected  Dental - normal exam     Pulmonary    (+) sleep apnea on CPAP,   (-) asthma, not a smoker  Cardiovascular   Exercise tolerance: good (4-7 METS)    ECG reviewed  PT is on anticoagulation therapy  Patient on routine beta blocker and Beta blocker given within 24 hours of surgery    (+) hypertension, CAD, CABG (1993, 2V), cardiac stents (last 07/2016) hyperlipidemia,   (-) angina    ROS comment: Last plavix x5 days    Neuro/Psych  (-) seizures, TIA, CVA  GI/Hepatic/Renal/Endo    (+) obesity,     (-) liver disease, no renal disease, diabetes    Musculoskeletal     Abdominal    Substance History      OB/GYN          Other                        Anesthesia Plan    ASA 3     MAC     intravenous induction   Anesthetic plan, all risks, benefits, and alternatives have been provided, discussed and informed consent has been obtained with: patient.

## 2019-06-21 NOTE — ANESTHESIA POSTPROCEDURE EVALUATION
Patient: Lio Velasquez    Procedure Summary     Date:  06/21/19 Room / Location:  [facility] ENDOSCOPY 5 / [facility] END[facility]    Anesthesia Start:  1018 Anesthesia Stop:  1035    Procedure:  COLONOSCOPY WITH ANESTHESIA (N/A ) Diagnosis:       History of colon polyps      (History of colon polyps [Z86.010])    Surgeon:  Marciano Jaramillo DO Provider:  Hank Tovar CRNA    Anesthesia Type:  MAC ASA Status:  3          Anesthesia Type: MAC  Last vitals  BP   135/85 (06/21/19 0934)   Temp   97.1 °F (36.2 °C) (06/21/19 0934)   Pulse   71 (06/21/19 0934)   Resp   18 (06/21/19 0934)     SpO2   96 % (06/21/19 0934)     Post Anesthesia Care and Evaluation    Patient location during evaluation: PACU  Patient participation: complete - patient participated  Level of consciousness: awake and awake and alert  Pain score: 0  Pain management: adequate  Airway patency: patent  Anesthetic complications: No anesthetic complications    Cardiovascular status: acceptable and stable  Respiratory status: acceptable and unassisted  Hydration status: acceptable

## 2019-08-05 ENCOUNTER — OFFICE VISIT (OUTPATIENT)
Dept: CARDIOLOGY | Facility: CLINIC | Age: 64
End: 2019-08-05

## 2019-08-05 VITALS
SYSTOLIC BLOOD PRESSURE: 140 MMHG | HEIGHT: 72 IN | BODY MASS INDEX: 35.08 KG/M2 | DIASTOLIC BLOOD PRESSURE: 90 MMHG | OXYGEN SATURATION: 98 % | HEART RATE: 54 BPM | WEIGHT: 259 LBS

## 2019-08-05 DIAGNOSIS — Z95.1 S/P CABG (CORONARY ARTERY BYPASS GRAFT): ICD-10-CM

## 2019-08-05 DIAGNOSIS — I10 ESSENTIAL HYPERTENSION: ICD-10-CM

## 2019-08-05 DIAGNOSIS — M19.90 ARTHRITIS: ICD-10-CM

## 2019-08-05 DIAGNOSIS — I25.10 CORONARY ARTERY DISEASE INVOLVING NATIVE CORONARY ARTERY OF NATIVE HEART WITHOUT ANGINA PECTORIS: ICD-10-CM

## 2019-08-05 DIAGNOSIS — R07.2 PRECORDIAL CHEST PAIN: ICD-10-CM

## 2019-08-05 DIAGNOSIS — E78.2 MIXED HYPERLIPIDEMIA: Primary | ICD-10-CM

## 2019-08-05 DIAGNOSIS — Z95.5 STENTED CORONARY ARTERY: ICD-10-CM

## 2019-08-05 DIAGNOSIS — Z86.010 HISTORY OF COLON POLYPS: ICD-10-CM

## 2019-08-05 PROBLEM — Z01.810 PREOP CARDIOVASCULAR EXAM: Status: RESOLVED | Noted: 2017-09-26 | Resolved: 2019-08-05

## 2019-08-05 PROCEDURE — 93000 ELECTROCARDIOGRAM COMPLETE: CPT | Performed by: INTERNAL MEDICINE

## 2019-08-05 PROCEDURE — 99214 OFFICE O/P EST MOD 30 MIN: CPT | Performed by: INTERNAL MEDICINE

## 2019-08-05 RX ORDER — SODIUM CHLORIDE 9 MG/ML
1-3 INJECTION, SOLUTION INTRAVENOUS CONTINUOUS
Status: CANCELLED | OUTPATIENT
Start: 2019-08-05

## 2019-08-05 RX ORDER — NITROGLYCERIN 0.4 MG/1
TABLET SUBLINGUAL
Qty: 100 TABLET | Refills: 11 | Status: SHIPPED | OUTPATIENT
Start: 2019-08-05 | End: 2021-02-08 | Stop reason: SDUPTHER

## 2019-08-05 NOTE — H&P (VIEW-ONLY)
Lio Velasquez  [id number]  1955  63 y.o.  male    Referring Provider: Bridger Rosales MD    Reason for Follow-up Visit: Here for follow up after dobutamine stress echo  CAD S/P CABG X2 S/P stenting of SVG x2 last 7/16  S/P 2017  left ankle replacement Dr Alanna Dias [facility]  Now with recurrent chest pain, classic angina pectoris    Subjective     Chest pain with exertion, moderate substernal, pressure like. Lasts less than 5 minutes  Started 6-8  weeks ago  Occurs once or twice a week  Associated diaphoresis    associated nausea  No radiation  Precipitated with exertion    Relieved with rest  Not positional    No change with intake of food or antacids  No change with breathing  Moderate associated dyspnea      Joint pain in small, medium and large joints     Chronic low back pain      Symptoms reminiscent of prior angina.         History of present illness:  Lio Velasquez is a 63 y.o. yo male with history of CAD who presents today for   Chief Complaint   Patient presents with   • Shortness of Breath     2 YR FU    • Coronary Artery Disease   • Chest Pain     PRESSURE   • Nausea   .    History  Past Medical History:   Diagnosis Date   • Aortocoronary bypass status    • CAD in native artery    • Chest pain    • Coronary angioplasty status    • Hyperlipidemia     unspecified   • Hypertension, benign    • Obesity    • Sleep apnea     unspecified   ,   Past Surgical History:   Procedure Laterality Date   • ANKLE SURGERY      with screws    • CARDIAC CATHETERIZATION Left 03/05/2012 7/2016   • COLONOSCOPY  12/30/2008   • COLONOSCOPY  12/30/2013    two polyps removed from the sigmoid colon   • COLONOSCOPY N/A 6/21/2019    Procedure: COLONOSCOPY WITH ANESTHESIA;  Surgeon: Marciano Jaramillo DO;  Location: [facility] ENDOSCOPY;  Service: Gastroenterology   • CORONARY ARTERY BYPASS GRAFT  1993    x2   • CORONARY STENT PLACEMENT      X 3   • JOINT REPLACEMENT Left     left ankle   • SHOULDER SURGERY Right      with chano   • WRIST SURGERY Left    ,   Family History   Problem Relation Age of Onset   • Diabetes Mother         type II   • Heart failure Mother         congestive heart failure   • Colon polyps Neg Hx    • Esophageal cancer Neg Hx    • Rectal cancer Neg Hx    • Colon cancer Neg Hx    ,   Social History     Tobacco Use   • Smoking status: Never Smoker   • Smokeless tobacco: Never Used   Substance Use Topics   • Alcohol use: Yes     Comment: occasional   • Drug use: No   ,     Medications  Current Outpatient Medications   Medication Sig Dispense Refill   • aspirin 81 MG tablet Take 81 mg by mouth every night.     • clopidogrel (PLAVIX) 75 MG tablet Take 75 mg by mouth every night.     • folic acid-pyridoxine-cyanocobalamin (FOLTX) 2.5-25-2 MG tablet tablet Take 1 tablet by mouth every night.     • metoprolol succinate XL (TOPROL-XL) 50 MG 24 hr tablet Take 50 mg by mouth every night.     • Multiple Vitamins-Minerals (CENTRUM CARDIO PO) Take 2 doses by mouth every night.     • ramipril (ALTACE) 2.5 MG capsule Take 2.5 mg by mouth every night.     • simvastatin (ZOCOR) 80 MG tablet Take 1 tablet by mouth Every Night. 90 tablet 3   • Unable to find Med Name: CPAP     • Vortioxetine HBr (TRINTELLIX) 20 MG tablet Take  by mouth daily.     • CLENPIQ 10-3.5-12 MG-GM -GM/160ML solution Take 1 each by mouth Take As Directed. 320 mL 0   • nitroglycerin (NITROSTAT) 0.4 MG SL tablet 1 under the tongue as needed for angina, may repeat q5mins for up three doses 100 tablet 11     No current facility-administered medications for this visit.        Allergies:  Demerol [meperidine] and Morphine and related    Review of Systems  Review of Systems   Constitution: Positive for weakness and malaise/fatigue.   HENT: Negative.    Eyes: Negative.    Cardiovascular: Positive for chest pain and dyspnea on exertion. Negative for claudication, cyanosis, irregular heartbeat, leg swelling, near-syncope, orthopnea, palpitations, paroxysmal  "nocturnal dyspnea and syncope.   Respiratory: Negative.    Endocrine: Negative.    Hematologic/Lymphatic: Negative.    Skin: Negative.    Musculoskeletal: Positive for arthritis, back pain, joint pain and stiffness.   Gastrointestinal: Negative for anorexia.   Genitourinary: Negative.    Psychiatric/Behavioral: Negative.        Objective     Physical Exam:  /90   Pulse 54   Ht 182.9 cm (72.01\")   Wt 117 kg (259 lb)   SpO2 98%   BMI 35.12 kg/m²   Physical Exam   Constitutional: He appears well-developed.   HENT:   Head: Normocephalic.   Neck: Normal carotid pulses and no JVD present. No tracheal tenderness present. Carotid bruit is not present. No tracheal deviation and no edema present.   Cardiovascular: Regular rhythm, normal heart sounds and normal pulses.   Pulmonary/Chest: Effort normal. No stridor.   Abdominal: Soft.   Neurological: He is alert. He has normal strength. No cranial nerve deficit or sensory deficit.   Skin: Skin is warm.   Psychiatric: He has a normal mood and affect. His speech is normal and behavior is normal.       Results Review:       ECG 12 Lead  Date/Time: 8/5/2019 1:19 PM  Performed by: Storm Encinas MD  Authorized by: Storm Encinas MD   Comparison: compared with previous ECG from 9/26/2017  Similar to previous ECG  Rhythm: sinus bradycardia  Rate: bradycardic  Conduction: conduction normal  ST Segments: ST segments normal  T Waves: T waves normal  QRS axis: normal  Other: no other findings    Clinical impression: abnormal EKG            Assessment/Plan   Patient Active Problem List   Diagnosis   • Coronary artery disease involving native coronary artery of native heart without angina pectoris   • Hyperlipemia   • Arthritis   • Essential hypertension   • History of colon polyps   • S/P CABG (coronary artery bypass graft) X2 1993   • Stented coronary artery        Plan    S/L NTG PRN for chest pain, call me or go to ER if has to use S/L nitroglycerin   Requested Prescriptions "     Signed Prescriptions Disp Refills   • nitroglycerin (NITROSTAT) 0.4 MG SL tablet 100 tablet 11     Si under the tongue as needed for angina, may repeat q5mins for up three doses        Orders Placed This Encounter   Procedures   • XR chest pa and lateral     Standing Status:   Future     Standing Expiration Date:   2020     Order Specific Question:   Reason for Exam:     Answer:   cad   • ECG 12 Lead     This order was created via procedure documentation        ____________________________________________________________________________________________________________________________________________  Health maintenance and recommendations      Low salt/ HTN/ Heart healthy carbohydrate restricted cardiac diet   The patient is advised to reduce or avoid caffeine or other cardiac stimulants.     The patient was advised to avoid long term NSAIDS , use Tylenol PRN instead  Avoid cardiac stimulants including common drugs like Pseudoephedrine or excessive amounts of caffeine    Monitor for any signs of bleeding including red or dark stools. Fall precautions.        Advised staying uptodate with immunizations per established standard guidelines.      Offered to give patient  a copy      Questions were encouraged, asked and answered to the patient's  understanding and satisfaction. Questions if any regarding current medications and side effects, need for refills and importance of compliance to medications stressed.    Reviewed available prior notes, consults, prior visits, laboratory findings, radiology and cardiology relevant reports. Updated chart as applicable. I have reviewed the patient's medical history in detail and updated the computerized patient record as relevant.      Updated patient regarding any new or relevant abnormalities on review of records or any new findings on physical exam. Mentioned to patient about purpose of visit and desirable health short and long term goals and objectives.    Primary  to monitor CBC CMP Lipid panel and TSH as applicable    ___________________________________________________________________________________________________________________________________________             Return in about 3 months (around 11/5/2019).

## 2019-08-05 NOTE — PROGRESS NOTES
Lio Velasquez  [id number]  1955  63 y.o.  male    Referring Provider: Bridger Rosales MD    Reason for Follow-up Visit: Here for follow up after dobutamine stress echo  CAD S/P CABG X2 S/P stenting of SVG x2 last 7/16  S/P 2017  left ankle replacement Dr Alanna Dias [facility]  Now with recurrent chest pain, classic angina pectoris    Subjective     Chest pain with exertion, moderate substernal, pressure like. Lasts less than 5 minutes  Started 6-8  weeks ago  Occurs once or twice a week  Associated diaphoresis    associated nausea  No radiation  Precipitated with exertion    Relieved with rest  Not positional    No change with intake of food or antacids  No change with breathing  Moderate associated dyspnea      Joint pain in small, medium and large joints     Chronic low back pain      Symptoms reminiscent of prior angina.         History of present illness:  Lio Velasquez is a 63 y.o. yo male with history of CAD who presents today for   Chief Complaint   Patient presents with   • Shortness of Breath     2 YR FU    • Coronary Artery Disease   • Chest Pain     PRESSURE   • Nausea   .    History  Past Medical History:   Diagnosis Date   • Aortocoronary bypass status    • CAD in native artery    • Chest pain    • Coronary angioplasty status    • Hyperlipidemia     unspecified   • Hypertension, benign    • Obesity    • Sleep apnea     unspecified   ,   Past Surgical History:   Procedure Laterality Date   • ANKLE SURGERY      with screws    • CARDIAC CATHETERIZATION Left 03/05/2012 7/2016   • COLONOSCOPY  12/30/2008   • COLONOSCOPY  12/30/2013    two polyps removed from the sigmoid colon   • COLONOSCOPY N/A 6/21/2019    Procedure: COLONOSCOPY WITH ANESTHESIA;  Surgeon: Marciano Jaramillo DO;  Location: [facility] ENDOSCOPY;  Service: Gastroenterology   • CORONARY ARTERY BYPASS GRAFT  1993    x2   • CORONARY STENT PLACEMENT      X 3   • JOINT REPLACEMENT Left     left ankle   • SHOULDER SURGERY Right      with chano   • WRIST SURGERY Left    ,   Family History   Problem Relation Age of Onset   • Diabetes Mother         type II   • Heart failure Mother         congestive heart failure   • Colon polyps Neg Hx    • Esophageal cancer Neg Hx    • Rectal cancer Neg Hx    • Colon cancer Neg Hx    ,   Social History     Tobacco Use   • Smoking status: Never Smoker   • Smokeless tobacco: Never Used   Substance Use Topics   • Alcohol use: Yes     Comment: occasional   • Drug use: No   ,     Medications  Current Outpatient Medications   Medication Sig Dispense Refill   • aspirin 81 MG tablet Take 81 mg by mouth every night.     • clopidogrel (PLAVIX) 75 MG tablet Take 75 mg by mouth every night.     • folic acid-pyridoxine-cyanocobalamin (FOLTX) 2.5-25-2 MG tablet tablet Take 1 tablet by mouth every night.     • metoprolol succinate XL (TOPROL-XL) 50 MG 24 hr tablet Take 50 mg by mouth every night.     • Multiple Vitamins-Minerals (CENTRUM CARDIO PO) Take 2 doses by mouth every night.     • ramipril (ALTACE) 2.5 MG capsule Take 2.5 mg by mouth every night.     • simvastatin (ZOCOR) 80 MG tablet Take 1 tablet by mouth Every Night. 90 tablet 3   • Unable to find Med Name: CPAP     • Vortioxetine HBr (TRINTELLIX) 20 MG tablet Take  by mouth daily.     • CLENPIQ 10-3.5-12 MG-GM -GM/160ML solution Take 1 each by mouth Take As Directed. 320 mL 0   • nitroglycerin (NITROSTAT) 0.4 MG SL tablet 1 under the tongue as needed for angina, may repeat q5mins for up three doses 100 tablet 11     No current facility-administered medications for this visit.        Allergies:  Demerol [meperidine] and Morphine and related    Review of Systems  Review of Systems   Constitution: Positive for weakness and malaise/fatigue.   HENT: Negative.    Eyes: Negative.    Cardiovascular: Positive for chest pain and dyspnea on exertion. Negative for claudication, cyanosis, irregular heartbeat, leg swelling, near-syncope, orthopnea, palpitations, paroxysmal  "nocturnal dyspnea and syncope.   Respiratory: Negative.    Endocrine: Negative.    Hematologic/Lymphatic: Negative.    Skin: Negative.    Musculoskeletal: Positive for arthritis, back pain, joint pain and stiffness.   Gastrointestinal: Negative for anorexia.   Genitourinary: Negative.    Psychiatric/Behavioral: Negative.        Objective     Physical Exam:  /90   Pulse 54   Ht 182.9 cm (72.01\")   Wt 117 kg (259 lb)   SpO2 98%   BMI 35.12 kg/m²   Physical Exam   Constitutional: He appears well-developed.   HENT:   Head: Normocephalic.   Neck: Normal carotid pulses and no JVD present. No tracheal tenderness present. Carotid bruit is not present. No tracheal deviation and no edema present.   Cardiovascular: Regular rhythm, normal heart sounds and normal pulses.   Pulmonary/Chest: Effort normal. No stridor.   Abdominal: Soft.   Neurological: He is alert. He has normal strength. No cranial nerve deficit or sensory deficit.   Skin: Skin is warm.   Psychiatric: He has a normal mood and affect. His speech is normal and behavior is normal.       Results Review:       ECG 12 Lead  Date/Time: 8/5/2019 1:19 PM  Performed by: Storm Encinas MD  Authorized by: Strom Encinas MD   Comparison: compared with previous ECG from 9/26/2017  Similar to previous ECG  Rhythm: sinus bradycardia  Rate: bradycardic  Conduction: conduction normal  ST Segments: ST segments normal  T Waves: T waves normal  QRS axis: normal  Other: no other findings    Clinical impression: abnormal EKG            Assessment/Plan   Patient Active Problem List   Diagnosis   • Coronary artery disease involving native coronary artery of native heart without angina pectoris   • Hyperlipemia   • Arthritis   • Essential hypertension   • History of colon polyps   • S/P CABG (coronary artery bypass graft) X2 1993   • Stented coronary artery        Plan    S/L NTG PRN for chest pain, call me or go to ER if has to use S/L nitroglycerin   Requested Prescriptions "     Signed Prescriptions Disp Refills   • nitroglycerin (NITROSTAT) 0.4 MG SL tablet 100 tablet 11     Si under the tongue as needed for angina, may repeat q5mins for up three doses        Orders Placed This Encounter   Procedures   • XR chest pa and lateral     Standing Status:   Future     Standing Expiration Date:   2020     Order Specific Question:   Reason for Exam:     Answer:   cad   • ECG 12 Lead     This order was created via procedure documentation        ____________________________________________________________________________________________________________________________________________  Health maintenance and recommendations      Low salt/ HTN/ Heart healthy carbohydrate restricted cardiac diet   The patient is advised to reduce or avoid caffeine or other cardiac stimulants.     The patient was advised to avoid long term NSAIDS , use Tylenol PRN instead  Avoid cardiac stimulants including common drugs like Pseudoephedrine or excessive amounts of caffeine    Monitor for any signs of bleeding including red or dark stools. Fall precautions.        Advised staying uptodate with immunizations per established standard guidelines.      Offered to give patient  a copy      Questions were encouraged, asked and answered to the patient's  understanding and satisfaction. Questions if any regarding current medications and side effects, need for refills and importance of compliance to medications stressed.    Reviewed available prior notes, consults, prior visits, laboratory findings, radiology and cardiology relevant reports. Updated chart as applicable. I have reviewed the patient's medical history in detail and updated the computerized patient record as relevant.      Updated patient regarding any new or relevant abnormalities on review of records or any new findings on physical exam. Mentioned to patient about purpose of visit and desirable health short and long term goals and objectives.    Primary  to monitor CBC CMP Lipid panel and TSH as applicable    ___________________________________________________________________________________________________________________________________________             Return in about 3 months (around 11/5/2019).

## 2019-08-09 ENCOUNTER — TELEPHONE (OUTPATIENT)
Dept: GASTROENTEROLOGY | Facility: CLINIC | Age: 64
End: 2019-08-09

## 2019-08-12 ENCOUNTER — HOSPITAL ENCOUNTER (OUTPATIENT)
Facility: HOSPITAL | Age: 64
Discharge: HOME | End: 2019-08-13
Attending: INTERNAL MEDICINE | Admitting: INTERNAL MEDICINE

## 2019-08-12 DIAGNOSIS — R07.2 PRECORDIAL CHEST PAIN: ICD-10-CM

## 2019-08-12 LAB
ALBUMIN SERPL-MCNC: 4.1 G/DL (ref 3.5–5)
ALBUMIN/GLOB SERPL: 1.5 G/DL (ref 1.1–2.5)
ALP SERPL-CCNC: 64 U/L (ref 24–120)
ALT SERPL W P-5'-P-CCNC: 33 U/L (ref 0–54)
ANION GAP SERPL CALCULATED.3IONS-SCNC: 6 MMOL/L (ref 4–13)
AST SERPL-CCNC: 35 U/L (ref 7–45)
BASOPHILS # BLD AUTO: 0.04 10*3/MM3 (ref 0–0.2)
BASOPHILS NFR BLD AUTO: 0.9 % (ref 0–1.5)
BILIRUB SERPL-MCNC: 0.7 MG/DL (ref 0.1–1)
BUN BLD-MCNC: 13 MG/DL (ref 5–21)
BUN/CREAT SERPL: 17.8 (ref 7–25)
CALCIUM SPEC-SCNC: 9.2 MG/DL (ref 8.4–10.4)
CHLORIDE SERPL-SCNC: 107 MMOL/L (ref 98–110)
CO2 SERPL-SCNC: 27 MMOL/L (ref 24–31)
CREAT BLD-MCNC: 0.73 MG/DL (ref 0.5–1.4)
DEPRECATED RDW RBC AUTO: 41.3 FL (ref 37–54)
EOSINOPHIL # BLD AUTO: 0.09 10*3/MM3 (ref 0–0.4)
EOSINOPHIL NFR BLD AUTO: 2 % (ref 0.3–6.2)
ERYTHROCYTE [DISTWIDTH] IN BLOOD BY AUTOMATED COUNT: 12.7 % (ref 12.3–15.4)
GFR SERPL CREATININE-BSD FRML MDRD: 109 ML/MIN/1.73
GLOBULIN UR ELPH-MCNC: 2.7 GM/DL
GLUCOSE BLD-MCNC: 108 MG/DL (ref 70–100)
HCT VFR BLD AUTO: 39.9 % (ref 37.5–51)
HGB BLD-MCNC: 13.8 G/DL (ref 13–17.7)
IMM GRANULOCYTES # BLD AUTO: 0.01 10*3/MM3 (ref 0–0.05)
IMM GRANULOCYTES NFR BLD AUTO: 0.2 % (ref 0–0.5)
INR PPP: 0.94 (ref 0.91–1.09)
LYMPHOCYTES # BLD AUTO: 1.68 10*3/MM3 (ref 0.7–3.1)
LYMPHOCYTES NFR BLD AUTO: 37.8 % (ref 19.6–45.3)
MCH RBC QN AUTO: 31.6 PG (ref 26.6–33)
MCHC RBC AUTO-ENTMCNC: 34.6 G/DL (ref 31.5–35.7)
MCV RBC AUTO: 91.3 FL (ref 79–97)
MONOCYTES # BLD AUTO: 0.54 10*3/MM3 (ref 0.1–0.9)
MONOCYTES NFR BLD AUTO: 12.2 % (ref 5–12)
NEUTROPHILS # BLD AUTO: 2.08 10*3/MM3 (ref 1.7–7)
NEUTROPHILS NFR BLD AUTO: 46.9 % (ref 42.7–76)
NRBC BLD AUTO-RTO: 0 /100 WBC (ref 0–0.2)
PLATELET # BLD AUTO: 141 10*3/MM3 (ref 140–450)
PMV BLD AUTO: 11.7 FL (ref 6–12)
POTASSIUM BLD-SCNC: 4.3 MMOL/L (ref 3.5–5.3)
PROT SERPL-MCNC: 6.8 G/DL (ref 6.3–8.7)
PROTHROMBIN TIME: 12.8 SECONDS (ref 11.9–14.6)
RBC # BLD AUTO: 4.37 10*6/MM3 (ref 4.14–5.8)
SODIUM BLD-SCNC: 140 MMOL/L (ref 135–145)
WBC NRBC COR # BLD: 4.44 10*3/MM3 (ref 3.4–10.8)

## 2019-08-12 PROCEDURE — 99152 MOD SED SAME PHYS/QHP 5/>YRS: CPT | Performed by: INTERNAL MEDICINE

## 2019-08-12 PROCEDURE — C1887 CATHETER, GUIDING: HCPCS | Performed by: INTERNAL MEDICINE

## 2019-08-12 PROCEDURE — 25010000002 BIVALIRUDIN 5 MG/ML: Performed by: INTERNAL MEDICINE

## 2019-08-12 PROCEDURE — C1769 GUIDE WIRE: HCPCS | Performed by: INTERNAL MEDICINE

## 2019-08-12 PROCEDURE — C9600 PERC DRUG-EL COR STENT SING: HCPCS | Performed by: INTERNAL MEDICINE

## 2019-08-12 PROCEDURE — G0378 HOSPITAL OBSERVATION PER HR: HCPCS

## 2019-08-12 PROCEDURE — 94799 UNLISTED PULMONARY SVC/PX: CPT

## 2019-08-12 PROCEDURE — C1884 EMBOLIZATION PROTECT SYST: HCPCS | Performed by: INTERNAL MEDICINE

## 2019-08-12 PROCEDURE — 94760 N-INVAS EAR/PLS OXIMETRY 1: CPT

## 2019-08-12 PROCEDURE — 25010000002 HEPARIN (PORCINE): Performed by: INTERNAL MEDICINE

## 2019-08-12 PROCEDURE — C1894 INTRO/SHEATH, NON-LASER: HCPCS | Performed by: INTERNAL MEDICINE

## 2019-08-12 PROCEDURE — C1874 STENT, COATED/COV W/DEL SYS: HCPCS | Performed by: INTERNAL MEDICINE

## 2019-08-12 PROCEDURE — 25010000002 FENTANYL CITRATE (PF) 100 MCG/2ML SOLUTION: Performed by: INTERNAL MEDICINE

## 2019-08-12 PROCEDURE — 92928 PRQ TCAT PLMT NTRAC ST 1 LES: CPT | Performed by: INTERNAL MEDICINE

## 2019-08-12 PROCEDURE — 99153 MOD SED SAME PHYS/QHP EA: CPT | Performed by: INTERNAL MEDICINE

## 2019-08-12 PROCEDURE — 93459 L HRT ART/GRFT ANGIO: CPT | Performed by: INTERNAL MEDICINE

## 2019-08-12 PROCEDURE — 25010000002 IOPAMIDOL 61 % SOLUTION: Performed by: INTERNAL MEDICINE

## 2019-08-12 PROCEDURE — 25010000002 MIDAZOLAM PER 1 MG: Performed by: INTERNAL MEDICINE

## 2019-08-12 PROCEDURE — C1725 CATH, TRANSLUMIN NON-LASER: HCPCS | Performed by: INTERNAL MEDICINE

## 2019-08-12 PROCEDURE — 85610 PROTHROMBIN TIME: CPT | Performed by: INTERNAL MEDICINE

## 2019-08-12 PROCEDURE — 80053 COMPREHEN METABOLIC PANEL: CPT | Performed by: INTERNAL MEDICINE

## 2019-08-12 PROCEDURE — 85025 COMPLETE CBC W/AUTO DIFF WBC: CPT | Performed by: INTERNAL MEDICINE

## 2019-08-12 PROCEDURE — C1760 CLOSURE DEV, VASC: HCPCS | Performed by: INTERNAL MEDICINE

## 2019-08-12 PROCEDURE — 92937 PRQ TRLUML REVSC CAB GRF 1: CPT | Performed by: INTERNAL MEDICINE

## 2019-08-12 PROCEDURE — 25010000002 DIPHENHYDRAMINE PER 50 MG: Performed by: INTERNAL MEDICINE

## 2019-08-12 DEVICE — XIENCE SIERRA™ EVEROLIMUS ELUTING CORONARY STENT SYSTEM 3.50 MM X 23 MM / RAPID-EXCHANGE
Type: IMPLANTABLE DEVICE | Status: FUNCTIONAL
Brand: XIENCE SIERRA™

## 2019-08-12 DEVICE — XIENCE SIERRA™ EVEROLIMUS ELUTING CORONARY STENT SYSTEM 3.50 MM X 38 MM / RAPID-EXCHANGE
Type: IMPLANTABLE DEVICE | Status: FUNCTIONAL
Brand: XIENCE SIERRA™

## 2019-08-12 RX ORDER — ROSUVASTATIN CALCIUM 10 MG/1
10 TABLET, COATED ORAL NIGHTLY
Status: DISCONTINUED | OUTPATIENT
Start: 2019-08-12 | End: 2019-08-13 | Stop reason: HOSPADM

## 2019-08-12 RX ORDER — SODIUM CHLORIDE 9 MG/ML
1-3 INJECTION, SOLUTION INTRAVENOUS CONTINUOUS
Status: DISCONTINUED | OUTPATIENT
Start: 2019-08-12 | End: 2019-08-13 | Stop reason: HOSPADM

## 2019-08-12 RX ORDER — METOPROLOL SUCCINATE 50 MG/1
50 TABLET, EXTENDED RELEASE ORAL NIGHTLY
Status: DISCONTINUED | OUTPATIENT
Start: 2019-08-12 | End: 2019-08-13 | Stop reason: HOSPADM

## 2019-08-12 RX ORDER — RAMIPRIL 2.5 MG/1
2.5 CAPSULE ORAL NIGHTLY
Status: DISCONTINUED | OUTPATIENT
Start: 2019-08-12 | End: 2019-08-13 | Stop reason: HOSPADM

## 2019-08-12 RX ORDER — SODIUM CHLORIDE 9 MG/ML
100 INJECTION, SOLUTION INTRAVENOUS CONTINUOUS
Status: DISCONTINUED | OUTPATIENT
Start: 2019-08-12 | End: 2019-08-13 | Stop reason: HOSPADM

## 2019-08-12 RX ORDER — LIDOCAINE HYDROCHLORIDE 20 MG/ML
INJECTION, SOLUTION INFILTRATION; PERINEURAL AS NEEDED
Status: DISCONTINUED | OUTPATIENT
Start: 2019-08-12 | End: 2019-08-12 | Stop reason: HOSPADM

## 2019-08-12 RX ORDER — FENTANYL CITRATE 50 UG/ML
INJECTION, SOLUTION INTRAMUSCULAR; INTRAVENOUS AS NEEDED
Status: DISCONTINUED | OUTPATIENT
Start: 2019-08-12 | End: 2019-08-12 | Stop reason: HOSPADM

## 2019-08-12 RX ORDER — DIPHENHYDRAMINE HYDROCHLORIDE 50 MG/ML
INJECTION INTRAMUSCULAR; INTRAVENOUS AS NEEDED
Status: DISCONTINUED | OUTPATIENT
Start: 2019-08-12 | End: 2019-08-12 | Stop reason: HOSPADM

## 2019-08-12 RX ORDER — NITROGLYCERIN 0.4 MG/1
0.4 TABLET SUBLINGUAL
Status: DISCONTINUED | OUTPATIENT
Start: 2019-08-12 | End: 2019-08-13 | Stop reason: HOSPADM

## 2019-08-12 RX ORDER — ASPIRIN 81 MG/1
81 TABLET ORAL DAILY
Status: DISCONTINUED | OUTPATIENT
Start: 2019-08-12 | End: 2019-08-13 | Stop reason: HOSPADM

## 2019-08-12 RX ORDER — MIDAZOLAM HYDROCHLORIDE 1 MG/ML
INJECTION INTRAMUSCULAR; INTRAVENOUS AS NEEDED
Status: DISCONTINUED | OUTPATIENT
Start: 2019-08-12 | End: 2019-08-12 | Stop reason: HOSPADM

## 2019-08-12 RX ADMIN — SODIUM CHLORIDE 100 ML/HR: 9 INJECTION, SOLUTION INTRAVENOUS at 17:21

## 2019-08-12 RX ADMIN — FOLIC ACID-PYRIDOXINE-CYANOCOBALAMIN TAB 2.5-25-2 MG 1 TABLET: 2.5-25-2 TAB at 21:07

## 2019-08-12 RX ADMIN — ASPIRIN 81 MG: 81 TABLET ORAL at 17:27

## 2019-08-12 RX ADMIN — METOPROLOL SUCCINATE 50 MG: 50 TABLET, FILM COATED, EXTENDED RELEASE ORAL at 21:07

## 2019-08-12 RX ADMIN — ROSUVASTATIN CALCIUM 10 MG: 10 TABLET, FILM COATED ORAL at 21:07

## 2019-08-12 RX ADMIN — RAMIPRIL 2.5 MG: 2.5 CAPSULE ORAL at 21:07

## 2019-08-12 NOTE — PLAN OF CARE
Problem: Patient Care Overview  Goal: Plan of Care Review  Outcome: Ongoing (interventions implemented as appropriate)   08/12/19 2290   Coping/Psychosocial   Plan of Care Reviewed With patient   Plan of Care Review   Progress no change   OTHER   Outcome Summary pt had heart cath today per right groin. he had 2 stents placed. home in am. cont to monitor.      Goal: Individualization and Mutuality  Outcome: Ongoing (interventions implemented as appropriate)    Goal: Discharge Needs Assessment  Outcome: Ongoing (interventions implemented as appropriate)    Goal: Interprofessional Rounds/Family Conf  Outcome: Ongoing (interventions implemented as appropriate)      Problem: Cardiac Catheterization (Diagnostic/Interventional) (Adult)  Goal: Signs and Symptoms of Listed Potential Problems Will be Absent, Minimized or Managed (Cardiac Catheterization)  Outcome: Ongoing (interventions implemented as appropriate)    Goal: Anesthesia/Sedation Recovery  Outcome: Ongoing (interventions implemented as appropriate)

## 2019-08-13 VITALS
RESPIRATION RATE: 18 BRPM | DIASTOLIC BLOOD PRESSURE: 64 MMHG | OXYGEN SATURATION: 95 % | HEIGHT: 72 IN | WEIGHT: 263 LBS | BODY MASS INDEX: 35.62 KG/M2 | HEART RATE: 62 BPM | SYSTOLIC BLOOD PRESSURE: 126 MMHG | TEMPERATURE: 98.3 F

## 2019-08-13 LAB
ANION GAP SERPL CALCULATED.3IONS-SCNC: 7 MMOL/L (ref 4–13)
ARTICHOKE IGE QN: 87 MG/DL (ref 0–99)
BUN BLD-MCNC: 10 MG/DL (ref 5–21)
BUN/CREAT SERPL: 12.8 (ref 7–25)
CALCIUM SPEC-SCNC: 9.1 MG/DL (ref 8.4–10.4)
CHLORIDE SERPL-SCNC: 108 MMOL/L (ref 98–110)
CHOLEST SERPL-MCNC: 129 MG/DL (ref 130–200)
CO2 SERPL-SCNC: 26 MMOL/L (ref 24–31)
CREAT BLD-MCNC: 0.78 MG/DL (ref 0.5–1.4)
DEPRECATED RDW RBC AUTO: 42.7 FL (ref 37–54)
ERYTHROCYTE [DISTWIDTH] IN BLOOD BY AUTOMATED COUNT: 12.8 % (ref 12.3–15.4)
GFR SERPL CREATININE-BSD FRML MDRD: 101 ML/MIN/1.73
GLUCOSE BLD-MCNC: 114 MG/DL (ref 70–100)
HBA1C MFR BLD: 6.1 % (ref 4.8–5.9)
HCT VFR BLD AUTO: 39.8 % (ref 37.5–51)
HDLC SERPL-MCNC: 27 MG/DL
HGB BLD-MCNC: 13.6 G/DL (ref 13–17.7)
LDLC/HDLC SERPL: 2.44 {RATIO}
MCH RBC QN AUTO: 31.5 PG (ref 26.6–33)
MCHC RBC AUTO-ENTMCNC: 34.2 G/DL (ref 31.5–35.7)
MCV RBC AUTO: 92.1 FL (ref 79–97)
PLATELET # BLD AUTO: 138 10*3/MM3 (ref 140–450)
PMV BLD AUTO: 11.5 FL (ref 6–12)
POTASSIUM BLD-SCNC: 4.3 MMOL/L (ref 3.5–5.3)
RBC # BLD AUTO: 4.32 10*6/MM3 (ref 4.14–5.8)
SODIUM BLD-SCNC: 141 MMOL/L (ref 135–145)
TRIGL SERPL-MCNC: 180 MG/DL (ref 0–149)
WBC NRBC COR # BLD: 6.75 10*3/MM3 (ref 3.4–10.8)

## 2019-08-13 PROCEDURE — 80061 LIPID PANEL: CPT | Performed by: INTERNAL MEDICINE

## 2019-08-13 PROCEDURE — 94799 UNLISTED PULMONARY SVC/PX: CPT

## 2019-08-13 PROCEDURE — 85027 COMPLETE CBC AUTOMATED: CPT | Performed by: INTERNAL MEDICINE

## 2019-08-13 PROCEDURE — 93005 ELECTROCARDIOGRAM TRACING: CPT | Performed by: INTERNAL MEDICINE

## 2019-08-13 PROCEDURE — 80048 BASIC METABOLIC PNL TOTAL CA: CPT | Performed by: INTERNAL MEDICINE

## 2019-08-13 PROCEDURE — 94760 N-INVAS EAR/PLS OXIMETRY 1: CPT

## 2019-08-13 PROCEDURE — 93010 ELECTROCARDIOGRAM REPORT: CPT | Performed by: INTERNAL MEDICINE

## 2019-08-13 PROCEDURE — 99217 PR OBSERVATION CARE DISCHARGE MANAGEMENT: CPT | Performed by: INTERNAL MEDICINE

## 2019-08-13 PROCEDURE — 83036 HEMOGLOBIN GLYCOSYLATED A1C: CPT | Performed by: INTERNAL MEDICINE

## 2019-08-13 RX ORDER — ROSUVASTATIN CALCIUM 10 MG/1
10 TABLET, COATED ORAL NIGHTLY
Qty: 90 TABLET | Refills: 3 | Status: SHIPPED | OUTPATIENT
Start: 2019-08-13 | End: 2020-07-21

## 2019-08-13 RX ADMIN — SODIUM CHLORIDE 100 ML/HR: 9 INJECTION, SOLUTION INTRAVENOUS at 03:08

## 2019-08-13 RX ADMIN — TICAGRELOR 90 MG: 90 TABLET ORAL at 03:08

## 2019-08-13 RX ADMIN — ASPIRIN 81 MG: 81 TABLET ORAL at 08:46

## 2019-08-13 NOTE — DISCHARGE SUMMARY
LOS: 0 days   Patient Care Team:  Bridger Rosales MD as PCP - General  Bridger Rosales MD as PCP - Family Medicine  Storm Encinas MD as Cardiologist (Cardiology)  Alanna Dias MD as Surgeon (Orthopedic Surgery)  Marciano Jaramillo DO as Consulting Physician (Gastroenterology)    Chief Complaint:   Precordial chest pain    Shortness of breath    Subjective  Feeling  better  No chest pain   No excessive shortness of breath  No new complaints    Interval History: Improved overall    Denies chest pain currently. Denies excessive shortness of breath. Denies abdominal pain, nausea vomiting or diarrhoea.    Telemetry: no malignant arrhythmia. No significant pauses.    Review of Systems   Constitutional: No chills   No fatigue   No fever.   HENT: Negative.    Eyes: Negative.    Respiratory: Negative for cough,   No chest wall soreness,   Mild shortness of breath,   no wheezing, no stridor.    Cardiovascular: Negative for chest pain,   No palpitations   No significant  leg swelling.   Gastrointestinal: Negative for abdominal distention,  No abdominal pain,   No blood in stool, constipation, diarrhea, nausea or vomiting.   Endocrine: Negative.    Genitourinary: Negative for difficulty urinating, dysuria, flank pain and hematuria.   Musculoskeletal: Negative.    Skin: Negative for rash and wound.   Allergic/Immunologic: Negative.    Neurological: Negative for dizziness, syncope, weakness,   No light-headedness or headaches.   Hematological: Does not bruise/bleed easily.   Psychiatric/Behavioral: Negative for agitation, behavioral problems, confusion, the patient does not appear to be nervous/anxious.       History:   Past Medical History:   Diagnosis Date   • Aortocoronary bypass status    • CAD in native artery    • Chest pain    • Coronary angioplasty status    • Hyperlipidemia     unspecified   • Hypertension, benign    • Obesity    • Sleep apnea     unspecified     Past Surgical History:   Procedure  Laterality Date   • ANKLE SURGERY      with screws    • CARDIAC CATHETERIZATION Left 03/05/2012 7/2016   • CARDIAC CATHETERIZATION Left 8/12/2019    Procedure: Cardiac Catheterization/Vascular Study BRIANNA OK;  Surgeon: Storm Encinas MD;  Location: [facility] CATH INVASIVE LOCATION;  Service: Cardiology   • COLONOSCOPY  12/30/2008   • COLONOSCOPY  12/30/2013    two polyps removed from the sigmoid colon   • COLONOSCOPY N/A 6/21/2019    Procedure: COLONOSCOPY WITH ANESTHESIA;  Surgeon: Marciano Jaramillo DO;  Location: [facility] ENDOSCOPY;  Service: Gastroenterology   • CORONARY ARTERY BYPASS GRAFT  1993    x2   • CORONARY STENT PLACEMENT      X 3   • JOINT REPLACEMENT Left     left ankle   • SHOULDER SURGERY Right     with chano   • WRIST SURGERY Left      Social History     Socioeconomic History   • Marital status:      Spouse name: Not on file   • Number of children: Not on file   • Years of education: Not on file   • Highest education level: Not on file   Tobacco Use   • Smoking status: Never Smoker   • Smokeless tobacco: Never Used   Substance and Sexual Activity   • Alcohol use: Yes     Comment: occasional   • Drug use: No   • Sexual activity: Defer     Family History   Problem Relation Age of Onset   • Diabetes Mother         type II   • Heart failure Mother         congestive heart failure   • Colon polyps Neg Hx    • Esophageal cancer Neg Hx    • Rectal cancer Neg Hx    • Colon cancer Neg Hx        Labs:  WBC WBC   Date Value Ref Range Status   08/13/2019 6.75 3.40 - 10.80 10*3/mm3 Final   08/12/2019 4.44 3.40 - 10.80 10*3/mm3 Final      HGB Hemoglobin   Date Value Ref Range Status   08/13/2019 13.6 13.0 - 17.7 g/dL Final   08/12/2019 13.8 13.0 - 17.7 g/dL Final      HCT Hematocrit   Date Value Ref Range Status   08/13/2019 39.8 37.5 - 51.0 % Final   08/12/2019 39.9 37.5 - 51.0 % Final      Platlets Platelets   Date Value Ref Range Status   08/13/2019 138 (L) 140 - 450 10*3/mm3 Final   08/12/2019 141 140 - 450  10*3/mm3 Final      MCV MCV   Date Value Ref Range Status   08/13/2019 92.1 79.0 - 97.0 fL Final   08/12/2019 91.3 79.0 - 97.0 fL Final        Results from last 7 days   Lab Units 08/13/19  0449 08/12/19  1300   SODIUM mmol/L 141 140   POTASSIUM mmol/L 4.3 4.3   CHLORIDE mmol/L 108 107   CO2 mmol/L 26.0 27.0   BUN mg/dL 10 13   CREATININE mg/dL 0.78 0.73   CALCIUM mg/dL 9.1 9.2   BILIRUBIN mg/dL  --  0.7   ALK PHOS U/L  --  64   ALT (SGPT) U/L  --  33   AST (SGOT) U/L  --  35   GLUCOSE mg/dL 114* 108*     Lab Results   Component Value Date    CKMB 1.61 07/07/2016    TROPONINI <0.012 07/07/2016     PT/INR:    Protime   Date Value Ref Range Status   08/12/2019 12.8 11.9 - 14.6 Seconds Final   /  INR   Date Value Ref Range Status   08/12/2019 0.94 0.91 - 1.09 Final       Imaging Results (last 72 hours)     ** No results found for the last 72 hours. **          Objective     Allergies   Allergen Reactions   • Demerol [Meperidine]    • Morphine And Related Itching       Medication Review: Performed  No current facility-administered medications for this encounter.      Current Outpatient Medications   Medication Sig Dispense Refill   • aspirin 81 MG tablet Take 81 mg by mouth every night.     • folic acid-pyridoxine-cyanocobalamin (FOLTX) 2.5-25-2 MG tablet tablet Take 1 tablet by mouth every night.     • metoprolol succinate XL (TOPROL-XL) 50 MG 24 hr tablet Take 50 mg by mouth every night.     • Multiple Vitamins-Minerals (CENTRUM CARDIO PO) Take 2 doses by mouth every night.     • ramipril (ALTACE) 2.5 MG capsule Take 2.5 mg by mouth every night.     • Unable to find Med Name: CPAP     • Vortioxetine HBr (TRINTELLIX) 20 MG tablet Take  by mouth daily.     • nitroglycerin (NITROSTAT) 0.4 MG SL tablet 1 under the tongue as needed for angina, may repeat q5mins for up three doses 100 tablet 11   • rosuvastatin (CRESTOR) 10 MG tablet Take 1 tablet by mouth Every Night. 90 tablet 3   • ticagrelor (BRILINTA) 90 MG tablet  "tablet Take 1 tablet by mouth Every 12 (Twelve) Hours. 180 tablet 3       Vital Sign Min/Max for last 24 hours  Temp  Min: 98 °F (36.7 °C)  Max: 98.3 °F (36.8 °C)   BP  Min: 116/68  Max: 130/74   Pulse  Min: 57  Max: 65   Resp  Min: 18  Max: 18   SpO2  Min: 95 %  Max: 96 %   No Data Recorded   No Data Recorded     Flowsheet Rows      First Filed Value   Admission Height  182.9 cm (72\") Documented at 08/12/2019 1254   Admission Weight  116 kg (256 lb) Documented at 08/12/2019 1254          Results for orders placed during the hospital encounter of 09/29/17   Adult Stress Echo W/ Cont or Stress Agent if Necessary Per Protocol    Narrative · Left ventricular systolic function is normal. Estimated EF = 55%.  · Normal stress echo with no significant echocardiographic evidence for   myocardial ischemia.            Consults:   Consults     No orders found for last 30 day(s).          Procedures Performed  Procedure(s):  Cardiac Catheterization/Vascular Study BRIANNA OK       Physical Exam:  General Appearance: Awake, alert, in no acute distress  Eyes: Pupils equal and reactive    Ears: Appear intact with no abnormalities noted  Nose: Nares normal, no drainage  Neck: supple, trachea midline, no carotid bruit and no JVD  Back: no kyphosis present,    Lungs: respirations regular, respirations even and respirations unlabored  Heart: normal S1, S2, no murmurs gallops   abdomen: normal bowel sounds, no masses, no hepatomegaly, no splenomegaly, guarding and no rebound tenderness   Skin: no bleeding, bruising or rash  Extremities: no cyanosis  Psychiatric/Behavioral: Negative for agitation, behavioral problems, confusion, the patient does not appear to be nervous/anxious.     Right groin: Arteriotomy site healthy,  No bleeding, swelling or excessive bruising. Preserved distal pulses.      Results Review:   I reviewed the patient's new clinical results.  I reviewed the patient's new imaging results and agree with the interpretation.  I " reviewed the patient's other test results and agree with the interpretation  I personally viewed and interpreted the patient's EKG/Telemetry data  Discussed with patient,        Assessment/Plan     Discharge diagnosis with prior    Precordial chest pain  PTCA of saphenous vein graft to distal right coronary artery  Drug-eluting stent placement x2 to saphenous vein graft to distal right coronary artery  Mixed hyperlipidemia  Depression    Hospital Course  Patient is a 63 y.o. male presented with symptoms and findings consistent with ischemic heart disease and underwent cardiac  Catheterization with findings and recommendations as below:     Conclusion of cardiac catheterization     Severe sequential stenosis of mid and distal saphenous venous graft to right coronary artery treated primary stenting using 3.5 x 38 mm Jamee drug-eluting stent  PTCA of the distal right coronary artery followed by implantation of 3.5 x 23 mm Jamee drug-eluting stent.  The very distal portion of the saphenous venous graft to the right coronary artery has a 50% stenosis.  ROSS-3 flow before and after procedure.  No symptoms.  Filter wire was used.        ____________________________________________________________________________________________________________________________________________     Plan after cardiac catheterization        Discontinue Plavix  Start Brilinta 180 mg now followed by 90 mg p.o. twice daily  Stop Zocor and start Crestor 20 mg daily  Keep LDL well below 70 mg/dL  Given multiple stent placement to saphenous venous graft to the right coronary artery much higher incidence of acute stent thrombosis.  If he has any chest pain to come to the emergency room immediately.     Intensive risk factor modifications for both primary and secondary prevention if applicable  Hydration   Observation      Condition on Discharge: Stable and  Improved  ______________________________________________________________________________________________________________________________________________________________________________________________________________________________       Plan on discharge  ______________________________________________________________________________________________________________________________________________________________________________________________________________________________       OK to discharge  Low salt cardiac diet.   Discharge to home and or self care with improvement or resolution of presenting symptoms or complaints  Ambulating    Optimal medical therapy  Deep vein thrombosis precautions with hydration and increasing mobility  Counseled regarding disease appropriate diet, fluid, sodium, caffeine, stimulants intake   Stressed compliance to diet and medications   Avoid NSAIDS    Continue dual antiplatelet therapy  X 1 year,  Aspirin for life     Keep LDL below 70 mg/dl. Monitor liver and renal functions.   Monitor CBC, CMP, TSH (as indicated) and Lipid Panel by primary     No heavy lifting for 5-7 days greater than 10 pounds.  No heavy or strenuous pushing or pulling.    No tub baths, hot tubs, swimming pools, or submerging groin underwater for 1 week.  Wash groin site daily with antibacterial soap and water. Rinse and keep clean and dry.    No lotions, powders, or topical ointments to site. Keep open to air and dry.  Call our office with any concerns or if you notice redness, swelling, drainage, warmth, or pain at the site.     Follow up with primary provider and primary cardiologist as scheduled   Overall improved and deemed fit for discharge  Will be provided with written discharge instructions including diet and medications including prescriptions as required and labs as applicable    Questions were encouraged, asked and answered to the patient's  understanding and satisfaction.    Go to nearest emergency  room if recurrence of primary complaints or any suspected disabling or life threatening symptoms or complaints such as chest pain, increasing shortness of breath, profound dizziness, weakness, significant palpitations, passing out    episodes, cold sweats, excessive nausea etc  More than 30 minutes spent in the discharge process.   _______________________________________________________________________________________________________________________________________________________________________________________________________________________________________    Discharge Disposition: To home and self care  Home or Self Care    Discharge Medications     Discharge Medications      New Medications      Instructions Start Date   rosuvastatin 10 MG tablet  Commonly known as:  CRESTOR   10 mg, Oral, Nightly      ticagrelor 90 MG tablet tablet  Commonly known as:  BRILINTA   90 mg, Oral, Every 12 Hours Scheduled         Continue These Medications      Instructions Start Date   aspirin 81 MG tablet   81 mg, Oral, Nightly      CENTRUM CARDIO PO   2 doses, Oral, Nightly      FOLTX 2.5-25-2 MG tablet tablet  Generic drug:  folic acid-pyridoxine-cyanocobalamin   1 tablet, Oral, Nightly      metoprolol succinate XL 50 MG 24 hr tablet  Commonly known as:  TOPROL-XL   50 mg, Oral, Nightly      nitroglycerin 0.4 MG SL tablet  Commonly known as:  NITROSTAT   1 under the tongue as needed for angina, may repeat q5mins for up three doses      ramipril 2.5 MG capsule  Commonly known as:  ALTACE   2.5 mg, Oral, Nightly      TRINTELLIX 20 MG tablet  Generic drug:  Vortioxetine HBr   Oral, Daily      Unable to find   Med Name: CPAP              Discharge Diet:  Low salt heart healthy cardiac diet    Activity at Discharge:  As tolerated    Follow-up Appointments  Future Appointments   Date Time Provider Department Center   11/4/2019 11:30 AM Storm Encinas MD [facility]         Test Results Pending at Discharge: None        Storm Encinas MD  08/13/19  6:39 PM

## 2019-08-13 NOTE — PLAN OF CARE
Problem: Patient Care Overview  Goal: Plan of Care Review  Outcome: Ongoing (interventions implemented as appropriate)   08/13/19 0251   Plan of Care Review   Progress improving   OTHER   Outcome Summary VSS, c/o mild pain in right groin, pt requested no medication, slight oozing of right groin s/p heart cath, safeguard in place, will continue to monitor for changes, anticipate DC to home today.       Problem: Cardiac Catheterization (Diagnostic/Interventional) (Adult)  Goal: Signs and Symptoms of Listed Potential Problems Will be Absent, Minimized or Managed (Cardiac Catheterization)  Outcome: Ongoing (interventions implemented as appropriate)   08/13/19 0251   Goal/Outcome Evaluation   Problems Assessed (Cardiac Catheterization) all   Problems Present (Cardiac Cath) situational response     Goal: Anesthesia/Sedation Recovery  Outcome: Outcome(s) achieved Date Met: 08/13/19 08/13/19 0251   Goal/Outcome Evaluation   Anesthesia/Sedation Recovery recovered to baseline

## 2019-11-04 ENCOUNTER — OFFICE VISIT (OUTPATIENT)
Dept: CARDIOLOGY | Facility: CLINIC | Age: 64
End: 2019-11-04

## 2019-11-04 VITALS
HEIGHT: 72 IN | SYSTOLIC BLOOD PRESSURE: 120 MMHG | OXYGEN SATURATION: 98 % | BODY MASS INDEX: 34.7 KG/M2 | HEART RATE: 61 BPM | DIASTOLIC BLOOD PRESSURE: 80 MMHG | WEIGHT: 256.2 LBS

## 2019-11-04 DIAGNOSIS — Z95.1 S/P CABG (CORONARY ARTERY BYPASS GRAFT): ICD-10-CM

## 2019-11-04 DIAGNOSIS — I25.10 CORONARY ARTERY DISEASE INVOLVING NATIVE CORONARY ARTERY OF NATIVE HEART WITHOUT ANGINA PECTORIS: ICD-10-CM

## 2019-11-04 DIAGNOSIS — E78.2 MIXED HYPERLIPIDEMIA: ICD-10-CM

## 2019-11-04 DIAGNOSIS — I10 ESSENTIAL HYPERTENSION: ICD-10-CM

## 2019-11-04 DIAGNOSIS — M19.90 ARTHRITIS: ICD-10-CM

## 2019-11-04 DIAGNOSIS — Z95.5 STENTED CORONARY ARTERY: Primary | ICD-10-CM

## 2019-11-04 PROBLEM — R07.2 PRECORDIAL CHEST PAIN: Status: RESOLVED | Noted: 2019-08-05 | Resolved: 2019-11-04

## 2019-11-04 PROCEDURE — 99214 OFFICE O/P EST MOD 30 MIN: CPT | Performed by: INTERNAL MEDICINE

## 2019-11-04 PROCEDURE — 93000 ELECTROCARDIOGRAM COMPLETE: CPT | Performed by: INTERNAL MEDICINE

## 2019-11-04 NOTE — PROGRESS NOTES
Lio Velasquez  [id number]  1955  63 y.o.  male    Referring Provider: Bridger Rosales MD    Reason for Follow-up Visit: Here for follow up after dobutamine stress echo  CAD S/P CABG X2 S/P stenting of SVG x2 last 7/16  S/P 2017  left ankle replacement Dr Alanna Dias [facility]  Now with recurrent chest pain, classic angina pectoris  coronary artery disease stented coronary artery     Subjective      Feels much overall as during last visit  No new events or complaints since discharge from hospital  Overall the patient feels no major change from baseline symptoms      Mild chronic exertional shortness of breath on exertion relieved with rest  No significant cough or wheezing  Going on for several months      No palpitations  No associated chest pain  No significant pedal edema    No fever or chills  No significant expectoration    No hemoptysis  No presyncope or syncope            History of present illness:  Lio Velasquez is a 63 y.o. yo male with history of CAD who presents today for   Chief Complaint   Patient presents with   • Coronary Artery Disease     3 month follow up    .    History  Past Medical History:   Diagnosis Date   • Aortocoronary bypass status    • CAD in native artery    • Chest pain    • Coronary angioplasty status    • Hyperlipidemia     unspecified   • Hypertension, benign    • Obesity    • Sleep apnea     unspecified   ,   Past Surgical History:   Procedure Laterality Date   • ANKLE SURGERY      with screws    • CARDIAC CATHETERIZATION Left 03/05/2012 7/2016   • CARDIAC CATHETERIZATION Left 8/12/2019    Procedure: Cardiac Catheterization/Vascular Study BRIANNA OK;  Surgeon: Storm Encinas MD;  Location: [facility] CATH INVASIVE LOCATION;  Service: Cardiology   • COLONOSCOPY  12/30/2008   • COLONOSCOPY  12/30/2013    two polyps removed from the sigmoid colon   • COLONOSCOPY N/A 6/21/2019    Procedure: COLONOSCOPY WITH ANESTHESIA;  Surgeon: Marciano Jaramillo DO;  Location: [facility]  ENDOSCOPY;  Service: Gastroenterology   • CORONARY ARTERY BYPASS GRAFT  1993    x2   • CORONARY STENT PLACEMENT      X 3   • JOINT REPLACEMENT Left     left ankle   • SHOULDER SURGERY Right     with chano   • WRIST SURGERY Left    ,   Family History   Problem Relation Age of Onset   • Diabetes Mother         type II   • Heart failure Mother         congestive heart failure   • Colon polyps Neg Hx    • Esophageal cancer Neg Hx    • Rectal cancer Neg Hx    • Colon cancer Neg Hx    ,   Social History     Tobacco Use   • Smoking status: Never Smoker   • Smokeless tobacco: Never Used   Substance Use Topics   • Alcohol use: Yes     Comment: occasional   • Drug use: No   ,     Medications  Current Outpatient Medications   Medication Sig Dispense Refill   • aspirin 81 MG tablet Take 81 mg by mouth every night.     • folic acid-pyridoxine-cyanocobalamin (FOLTX) 2.5-25-2 MG tablet tablet Take 1 tablet by mouth every night.     • metoprolol succinate XL (TOPROL-XL) 50 MG 24 hr tablet Take 50 mg by mouth every night.     • Multiple Vitamins-Minerals (CENTRUM CARDIO PO) Take 2 doses by mouth every night.     • nitroglycerin (NITROSTAT) 0.4 MG SL tablet 1 under the tongue as needed for angina, may repeat q5mins for up three doses 100 tablet 11   • ramipril (ALTACE) 2.5 MG capsule Take 2.5 mg by mouth every night.     • rosuvastatin (CRESTOR) 10 MG tablet Take 1 tablet by mouth Every Night. 90 tablet 3   • ticagrelor (BRILINTA) 90 MG tablet tablet Take 1 tablet by mouth Every 12 (Twelve) Hours. 180 tablet 3   • Unable to find Med Name: CPAP     • Vortioxetine HBr (TRINTELLIX) 20 MG tablet Take  by mouth daily.       No current facility-administered medications for this visit.        Allergies:  Demerol [meperidine] and Morphine and related    Review of Systems  Review of Systems   Constitution: Positive for weakness and malaise/fatigue.   HENT: Negative.    Eyes: Negative.    Cardiovascular: Positive for dyspnea on exertion.  "Negative for chest pain, claudication, cyanosis, irregular heartbeat, leg swelling, near-syncope, orthopnea, palpitations, paroxysmal nocturnal dyspnea and syncope.   Respiratory: Negative.    Endocrine: Negative.    Hematologic/Lymphatic: Negative.    Skin: Negative.    Musculoskeletal: Positive for arthritis, back pain, joint pain and stiffness.   Gastrointestinal: Negative for anorexia.   Genitourinary: Negative.    Psychiatric/Behavioral: Negative.        Objective     Physical Exam:  /80   Pulse 61   Ht 182.9 cm (72\")   Wt 116 kg (256 lb 3.2 oz)   SpO2 98%   BMI 34.75 kg/m²   Physical Exam   Constitutional: He appears well-developed.   HENT:   Head: Normocephalic.   Neck: Normal carotid pulses and no JVD present. No tracheal tenderness present. Carotid bruit is not present. No tracheal deviation and no edema present.   Cardiovascular: Regular rhythm, normal heart sounds and normal pulses.   Pulmonary/Chest: Effort normal. No stridor.   Abdominal: Soft. He exhibits no distension. There is no hepatosplenomegaly. There is no tenderness.   Neurological: He is alert. He has normal strength. No cranial nerve deficit or sensory deficit.   Skin: Skin is warm.   Psychiatric: He has a normal mood and affect. His speech is normal and behavior is normal.       Results Review:      Results for orders placed during the hospital encounter of 09/29/17   Adult Stress Echo W/ Cont or Stress Agent if Necessary Per Protocol    Narrative · Left ventricular systolic function is normal. Estimated EF = 55%.  · Normal stress echo with no significant echocardiographic evidence for   myocardial ischemia.       cath 8/12/19     Conclusion of cardiac catheterization     Severe sequential stenosis of mid and distal saphenous venous graft to right coronary artery treated primary stenting using 3.5 x 38 mm Jamee drug-eluting stent  PTCA of the distal right coronary artery followed by implantation of 3.5 x 23 mm Jamee drug-eluting " stent.  The very distal portion of the saphenous venous graft to the right coronary artery has a 50% stenosis.  ROSS-3 flow before and after procedure.  No symptoms.  Filter wire was used.        ____________________________________________________________________________________________________________________________________________     Plan after cardiac catheterization        Discontinue Plavix  Start Brilinta 180 mg now followed by 90 mg p.o. twice daily  Stop Zocor and start Crestor 20 mg daily  Keep LDL well below 70 mg/dL  Given multiple stent placement to saphenous venous graft to the right coronary artery much higher incidence of acute stent thrombosis.  If he has any chest pain to come to the emergency room immediately.     Intensive risk factor modifications for both primary and secondary prevention if applicable  Hydration   Observation          ECG 12 Lead  Date/Time: 11/4/2019 11:48 AM  Performed by: Storm Encinas MD  Authorized by: Storm Encinas MD   Comparison: compared with previous ECG from 8/13/2019  Similar to previous ECG  Rhythm: sinus rhythm  Rate: bradycardic  Conduction: conduction normal  Q waves: III and aVF    QRS axis: normal  Other findings: left ventricular hypertrophy    Clinical impression: abnormal EKG            Assessment/Plan   Patient Active Problem List   Diagnosis   • Coronary artery disease involving native coronary artery of native heart without angina pectoris   • Hyperlipemia   • Arthritis   • Essential hypertension   • History of colon polyps   • S/P CABG (coronary artery bypass graft) X2 1993   • Stented coronary artery        Plan        S/L NTG PRN for chest pain, call me or go to ER if has to use S/L nitroglycerin        Keep LDL below 70 mg/dl. Monitor liver and renal functions.   Monitor CBC, CMP, TSH (as indicated) and Lipid Panel by primary      Orders Placed This Encounter   Procedures   • Ambulatory Referral to Cardiac Rehab     Referral Priority:   Routine      Referral Type:   Rehabilitation - Outpatient     Number of Visits Requested:   1   • ECG 12 Lead     This order was created via procedure documentation        ____________________________________________________________________________________________________________________________________________  Health maintenance and recommendations      Similar recommendations as last visit       Offered to give patient  a copy      Questions were encouraged, asked and answered to the patient's  understanding and satisfaction. Questions if any regarding current medications and side effects, need for refills and importance of compliance to medications stressed.    Reviewed available prior notes, consults, prior visits, laboratory findings, radiology and cardiology relevant reports. Updated chart as applicable. I have reviewed the patient's medical history in detail and updated the computerized patient record as relevant.      Updated patient regarding any new or relevant abnormalities on review of records or any new findings on physical exam. Mentioned to patient about purpose of visit and desirable health short and long term goals and objectives.    Primary to monitor CBC CMP Lipid panel and TSH as applicable    ___________________________________________________________________________________________________________________________________________             Return in about 3 months (around 2/4/2020).

## 2020-01-06 ENCOUNTER — TREATMENT (OUTPATIENT)
Dept: CARDIAC REHAB | Facility: HOSPITAL | Age: 65
End: 2020-01-06

## 2020-01-06 DIAGNOSIS — Z95.5 STATUS POST INSERTION OF DRUG ELUTING CORONARY ARTERY STENT: Primary | ICD-10-CM

## 2020-01-06 PROCEDURE — 93798 PHYS/QHP OP CAR RHAB W/ECG: CPT

## 2020-01-08 ENCOUNTER — TREATMENT (OUTPATIENT)
Dept: CARDIAC REHAB | Facility: HOSPITAL | Age: 65
End: 2020-01-08

## 2020-01-08 DIAGNOSIS — Z95.5 STATUS POST INSERTION OF DRUG ELUTING CORONARY ARTERY STENT: Primary | ICD-10-CM

## 2020-01-08 PROCEDURE — 93798 PHYS/QHP OP CAR RHAB W/ECG: CPT

## 2020-01-10 ENCOUNTER — TREATMENT (OUTPATIENT)
Dept: CARDIAC REHAB | Facility: HOSPITAL | Age: 65
End: 2020-01-10

## 2020-01-10 DIAGNOSIS — Z95.5 STATUS POST INSERTION OF DRUG ELUTING CORONARY ARTERY STENT: Primary | ICD-10-CM

## 2020-01-10 PROCEDURE — 93798 PHYS/QHP OP CAR RHAB W/ECG: CPT

## 2020-01-13 ENCOUNTER — TREATMENT (OUTPATIENT)
Dept: CARDIAC REHAB | Facility: HOSPITAL | Age: 65
End: 2020-01-13

## 2020-01-13 DIAGNOSIS — Z95.5 STATUS POST INSERTION OF DRUG ELUTING CORONARY ARTERY STENT: Primary | ICD-10-CM

## 2020-01-13 PROCEDURE — 93798 PHYS/QHP OP CAR RHAB W/ECG: CPT

## 2020-01-15 ENCOUNTER — TREATMENT (OUTPATIENT)
Dept: CARDIAC REHAB | Facility: HOSPITAL | Age: 65
End: 2020-01-15

## 2020-01-15 DIAGNOSIS — Z95.5 STATUS POST INSERTION OF DRUG ELUTING CORONARY ARTERY STENT: Primary | ICD-10-CM

## 2020-01-15 PROCEDURE — 93798 PHYS/QHP OP CAR RHAB W/ECG: CPT

## 2020-01-17 ENCOUNTER — TREATMENT (OUTPATIENT)
Dept: CARDIAC REHAB | Facility: HOSPITAL | Age: 65
End: 2020-01-17

## 2020-01-17 DIAGNOSIS — Z95.5 STATUS POST INSERTION OF DRUG ELUTING CORONARY ARTERY STENT: Primary | ICD-10-CM

## 2020-01-17 PROCEDURE — 93798 PHYS/QHP OP CAR RHAB W/ECG: CPT

## 2020-02-04 ENCOUNTER — OFFICE VISIT (OUTPATIENT)
Dept: CARDIOLOGY | Facility: CLINIC | Age: 65
End: 2020-02-04

## 2020-02-04 VITALS
HEART RATE: 66 BPM | HEIGHT: 72 IN | OXYGEN SATURATION: 98 % | WEIGHT: 256 LBS | DIASTOLIC BLOOD PRESSURE: 70 MMHG | BODY MASS INDEX: 34.67 KG/M2 | SYSTOLIC BLOOD PRESSURE: 110 MMHG

## 2020-02-04 DIAGNOSIS — Z95.1 S/P CABG (CORONARY ARTERY BYPASS GRAFT): ICD-10-CM

## 2020-02-04 DIAGNOSIS — Z95.5 STENTED CORONARY ARTERY: ICD-10-CM

## 2020-02-04 DIAGNOSIS — M19.90 ARTHRITIS: ICD-10-CM

## 2020-02-04 DIAGNOSIS — E78.2 MIXED HYPERLIPIDEMIA: ICD-10-CM

## 2020-02-04 DIAGNOSIS — I10 ESSENTIAL HYPERTENSION: ICD-10-CM

## 2020-02-04 DIAGNOSIS — I25.10 CORONARY ARTERY DISEASE INVOLVING NATIVE CORONARY ARTERY OF NATIVE HEART WITHOUT ANGINA PECTORIS: Primary | ICD-10-CM

## 2020-02-04 PROCEDURE — 99214 OFFICE O/P EST MOD 30 MIN: CPT | Performed by: INTERNAL MEDICINE

## 2020-02-04 NOTE — PROGRESS NOTES
Lio Velasquez  [id number]  1955  64 y.o.  male    Referring Provider: Bridger Rosales MD    Reason for Follow-up Visit: Here for routine follow up   Prior to last visit seen after dobutamine stress echo  CAD S/P CABG X2 S/P stenting of SVG x2 last 7/16  S/P 2017  left ankle replacement Dr Alanna Dias [facility]  Now with recurrent chest pain, classic angina pectoris  coronary artery disease stented coronary artery     Subjective      Overall feels well    No new events or complaints since last visit   Overall the patient feels no major change from baseline symptoms   Similar symptoms as during last visit      Mild chronic exertional shortness of breath on exertion relieved with rest  No significant cough or wheezing    No palpitations  No associated chest pain  No significant pedal edema    No fever or chills  No significant expectoration    No hemoptysis  No presyncope or syncope     Excellent effort tolerance with no cardiovascular limitations and at the patient's baseline        History of present illness:  Lio Velasquez is a 64 y.o. yo male with history of coronary artery disease Coronary artery bypass grafting stented coronary artery  who presents today for   Chief Complaint   Patient presents with   • Coronary Artery Disease     3 MON FU  BYPASS X 2 1993 STENT X 3 2019   .    History  Past Medical History:   Diagnosis Date   • Aortocoronary bypass status    • CAD in native artery    • Chest pain    • Coronary angioplasty status    • Hyperlipidemia     unspecified   • Hypertension, benign    • Obesity    • Sleep apnea     unspecified   ,   Past Surgical History:   Procedure Laterality Date   • ANKLE SURGERY      with screws    • CARDIAC CATHETERIZATION Left 03/05/2012 7/2016   • CARDIAC CATHETERIZATION Left 8/12/2019    Procedure: Cardiac Catheterization/Vascular Study BRIANNA OK;  Surgeon: Storm Encinas MD;  Location: [facility] CATH INVASIVE LOCATION;  Service: Cardiology   • COLONOSCOPY   12/30/2008   • COLONOSCOPY  12/30/2013    two polyps removed from the sigmoid colon   • COLONOSCOPY N/A 6/21/2019    Procedure: COLONOSCOPY WITH ANESTHESIA;  Surgeon: Marciano Jaramillo DO;  Location: [facility] ENDOSCOPY;  Service: Gastroenterology   • CORONARY ARTERY BYPASS GRAFT  1993    x2   • CORONARY STENT PLACEMENT  2019    X 3   • JOINT REPLACEMENT Left     left ankle   • SHOULDER SURGERY Right     with chano   • WRIST SURGERY Left    ,   Family History   Problem Relation Age of Onset   • Diabetes Mother         type II   • Heart failure Mother         congestive heart failure   • Colon polyps Neg Hx    • Esophageal cancer Neg Hx    • Rectal cancer Neg Hx    • Colon cancer Neg Hx    ,   Social History     Tobacco Use   • Smoking status: Never Smoker   • Smokeless tobacco: Never Used   Substance Use Topics   • Alcohol use: Yes     Comment: occasional   • Drug use: No   ,     Medications  Current Outpatient Medications   Medication Sig Dispense Refill   • aspirin 81 MG tablet Take 81 mg by mouth every night.     • folic acid-pyridoxine-cyanocobalamin (FOLTX) 2.5-25-2 MG tablet tablet Take 1 tablet by mouth every night.     • metoprolol succinate XL (TOPROL-XL) 50 MG 24 hr tablet Take 50 mg by mouth every night.     • Multiple Vitamins-Minerals (CENTRUM CARDIO PO) Take 2 doses by mouth every night.     • nitroglycerin (NITROSTAT) 0.4 MG SL tablet 1 under the tongue as needed for angina, may repeat q5mins for up three doses 100 tablet 11   • ramipril (ALTACE) 2.5 MG capsule Take 2.5 mg by mouth every night.     • rosuvastatin (CRESTOR) 10 MG tablet Take 1 tablet by mouth Every Night. 90 tablet 3   • ticagrelor (BRILINTA) 90 MG tablet tablet Take 1 tablet by mouth Every 12 (Twelve) Hours. 180 tablet 3   • Unable to find Med Name: CPAP     • Vortioxetine HBr (TRINTELLIX) 20 MG tablet Take  by mouth daily.       No current facility-administered medications for this visit.        Allergies:  Demerol [meperidine] and  "Morphine and related    Review of Systems  Review of Systems   Constitution: Positive for malaise/fatigue.   HENT: Negative.    Eyes: Negative.    Cardiovascular: Positive for dyspnea on exertion. Negative for chest pain, claudication, cyanosis, irregular heartbeat, leg swelling, near-syncope, orthopnea, palpitations, paroxysmal nocturnal dyspnea and syncope.   Respiratory: Negative.    Endocrine: Negative.    Hematologic/Lymphatic: Negative.    Skin: Negative.    Musculoskeletal: Positive for arthritis, back pain, joint pain and stiffness.   Gastrointestinal: Negative for anorexia.   Genitourinary: Negative.    Neurological: Positive for weakness.   Psychiatric/Behavioral: Negative.        Objective     Physical Exam:  /70   Pulse 66   Ht 182.9 cm (72.01\")   Wt 116 kg (256 lb)   SpO2 98%   BMI 34.71 kg/m²   Physical Exam   Constitutional: He appears well-developed.   HENT:   Head: Normocephalic.   Neck: Normal carotid pulses and no JVD present. No tracheal tenderness present. Carotid bruit is not present. No tracheal deviation and no edema present.   Cardiovascular: Regular rhythm, normal heart sounds and normal pulses.   Pulmonary/Chest: Effort normal. No stridor.   Abdominal: Soft. He exhibits no distension. There is no hepatosplenomegaly. There is no tenderness.   Neurological: He is alert. He has normal strength. No cranial nerve deficit or sensory deficit.   Skin: Skin is warm.   Psychiatric: He has a normal mood and affect. His speech is normal and behavior is normal.       Results Review:      Results for orders placed during the hospital encounter of 09/29/17   Adult Stress Echo W/ Cont or Stress Agent if Necessary Per Protocol    Narrative · Left ventricular systolic function is normal. Estimated EF = 55%.  · Normal stress echo with no significant echocardiographic evidence for   myocardial ischemia.       cath 8/12/19     Conclusion of cardiac catheterization     Severe sequential stenosis of " mid and distal saphenous venous graft to right coronary artery treated primary stenting using 3.5 x 38 mm Jamee drug-eluting stent  PTCA of the distal right coronary artery followed by implantation of 3.5 x 23 mm Jamee drug-eluting stent.  The very distal portion of the saphenous venous graft to the right coronary artery has a 50% stenosis.  ROSS-3 flow before and after procedure.  No symptoms.  Filter wire was used.        ____________________________________________________________________________________________________________________________________________     Plan after cardiac catheterization        Discontinue Plavix  Start Brilinta 180 mg now followed by 90 mg p.o. twice daily  Stop Zocor and start Crestor 20 mg daily  Keep LDL well below 70 mg/dL  Given multiple stent placement to saphenous venous graft to the right coronary artery much higher incidence of acute stent thrombosis.  If he has any chest pain to come to the emergency room immediately.     Intensive risk factor modifications for both primary and secondary prevention if applicable  Hydration   Observation        Procedures    Assessment/Plan   Patient Active Problem List   Diagnosis   • Coronary artery disease involving native coronary artery of native heart without angina pectoris   • Hyperlipemia   • Arthritis   • Essential hypertension   • History of colon polyps   • S/P CABG (coronary artery bypass graft) X2 1993   • Stented coronary artery        Plan        S/L NTG PRN for chest pain, call me or go to ER if has to use S/L nitroglycerin        Keep LDL below 70 mg/dl. Monitor liver and renal functions.   Monitor CBC, CMP, TSH (as indicated) and Lipid Panel by primary      Requested Prescriptions     Signed Prescriptions Disp Refills   • ticagrelor (BRILINTA) 90 MG tablet tablet 180 tablet 3     Sig: Take 1 tablet by mouth Every 12 (Twelve) Hours.        Discussed results of latest testing with patient  Answered all questions         Conitnue CPAP       ____________________________________________________________________________________________________________________________________________  Health maintenance and recommendations      Similar recommendations as last visit       Offered to give patient  a copy      Questions were encouraged, asked and answered to the patient's  understanding and satisfaction. Questions if any regarding current medications and side effects, need for refills and importance of compliance to medications stressed.    Reviewed available prior notes, consults, prior visits, laboratory findings, radiology and cardiology relevant reports. Updated chart as applicable. I have reviewed the patient's medical history in detail and updated the computerized patient record as relevant.      Updated patient regarding any new or relevant abnormalities on review of records or any new findings on physical exam. Mentioned to patient about purpose of visit and desirable health short and long term goals and objectives.    Primary to monitor CBC CMP Lipid panel and TSH as applicable    ___________________________________________________________________________________________________________________________________________        Return in about 6 months (around 8/4/2020).

## 2020-02-07 ENCOUNTER — APPOINTMENT (OUTPATIENT)
Dept: CARDIAC REHAB | Facility: HOSPITAL | Age: 65
End: 2020-02-07

## 2020-02-10 ENCOUNTER — APPOINTMENT (OUTPATIENT)
Dept: CARDIAC REHAB | Facility: HOSPITAL | Age: 65
End: 2020-02-10

## 2020-02-12 ENCOUNTER — APPOINTMENT (OUTPATIENT)
Dept: CARDIAC REHAB | Facility: HOSPITAL | Age: 65
End: 2020-02-12

## 2020-02-14 ENCOUNTER — APPOINTMENT (OUTPATIENT)
Dept: CARDIAC REHAB | Facility: HOSPITAL | Age: 65
End: 2020-02-14

## 2020-02-17 ENCOUNTER — APPOINTMENT (OUTPATIENT)
Dept: CARDIAC REHAB | Facility: HOSPITAL | Age: 65
End: 2020-02-17

## 2020-02-19 ENCOUNTER — APPOINTMENT (OUTPATIENT)
Dept: CARDIAC REHAB | Facility: HOSPITAL | Age: 65
End: 2020-02-19

## 2020-02-21 ENCOUNTER — APPOINTMENT (OUTPATIENT)
Dept: CARDIAC REHAB | Facility: HOSPITAL | Age: 65
End: 2020-02-21

## 2020-02-24 ENCOUNTER — APPOINTMENT (OUTPATIENT)
Dept: CARDIAC REHAB | Facility: HOSPITAL | Age: 65
End: 2020-02-24

## 2020-02-26 ENCOUNTER — APPOINTMENT (OUTPATIENT)
Dept: CARDIAC REHAB | Facility: HOSPITAL | Age: 65
End: 2020-02-26

## 2020-02-28 ENCOUNTER — APPOINTMENT (OUTPATIENT)
Dept: CARDIAC REHAB | Facility: HOSPITAL | Age: 65
End: 2020-02-28

## 2020-03-09 NOTE — TELEPHONE ENCOUNTER
PATIENT NEEDS A 7 DAY SUPPLY SENT TO HIS LOCAL PHARMACY UNTIL HIS MAIL ORDER CAN COME IN I WAS GOING TO SUPPLY SAMPLES BUT NONE WHERE AVAILABLE.

## 2020-07-21 RX ORDER — ROSUVASTATIN CALCIUM 10 MG/1
10 TABLET, COATED ORAL NIGHTLY
Qty: 90 TABLET | Refills: 0 | Status: SHIPPED | OUTPATIENT
Start: 2020-07-21 | End: 2020-08-10 | Stop reason: SDUPTHER

## 2020-08-10 ENCOUNTER — OFFICE VISIT (OUTPATIENT)
Dept: CARDIOLOGY | Facility: CLINIC | Age: 65
End: 2020-08-10

## 2020-08-10 VITALS
WEIGHT: 260 LBS | SYSTOLIC BLOOD PRESSURE: 112 MMHG | BODY MASS INDEX: 35.21 KG/M2 | HEIGHT: 72 IN | HEART RATE: 57 BPM | DIASTOLIC BLOOD PRESSURE: 76 MMHG

## 2020-08-10 DIAGNOSIS — R06.09 DOE (DYSPNEA ON EXERTION): ICD-10-CM

## 2020-08-10 DIAGNOSIS — Z95.5 STENTED CORONARY ARTERY: ICD-10-CM

## 2020-08-10 DIAGNOSIS — I25.10 CORONARY ARTERY DISEASE INVOLVING NATIVE CORONARY ARTERY OF NATIVE HEART WITHOUT ANGINA PECTORIS: ICD-10-CM

## 2020-08-10 DIAGNOSIS — M19.90 ARTHRITIS: Primary | ICD-10-CM

## 2020-08-10 DIAGNOSIS — Z86.010 HISTORY OF COLON POLYPS: ICD-10-CM

## 2020-08-10 DIAGNOSIS — I10 ESSENTIAL HYPERTENSION: ICD-10-CM

## 2020-08-10 DIAGNOSIS — Z95.1 S/P CABG (CORONARY ARTERY BYPASS GRAFT): ICD-10-CM

## 2020-08-10 DIAGNOSIS — E78.2 MIXED HYPERLIPIDEMIA: ICD-10-CM

## 2020-08-10 PROCEDURE — 93000 ELECTROCARDIOGRAM COMPLETE: CPT | Performed by: INTERNAL MEDICINE

## 2020-08-10 PROCEDURE — 99214 OFFICE O/P EST MOD 30 MIN: CPT | Performed by: INTERNAL MEDICINE

## 2020-08-10 RX ORDER — ROSUVASTATIN CALCIUM 10 MG/1
10 TABLET, COATED ORAL NIGHTLY
Qty: 90 TABLET | Refills: 3 | Status: SHIPPED | OUTPATIENT
Start: 2020-08-10 | End: 2021-12-10

## 2020-08-10 NOTE — ADDENDUM NOTE
Addended by: KENYA CORRALES on: 8/10/2020 08:24 AM     Modules accepted: Orders, Level of Service

## 2020-08-10 NOTE — PROGRESS NOTES
Lio Velasquez  [id number]  1955  64 y.o.  male    Referring Provider: Bridger Rosales MD    Reason for Follow-up Visit: Here for routine follow up   Prior to last visit seen after dobutamine stress echo  CAD S/P CABG X2 S/P stenting of SVG x2 last 7/16  S/P 2017  left ankle replacement Dr Alanna Dias [facility]  Now with recurrent chest pain, classic angina pectoris  coronary artery disease stented coronary artery     Subjective    Overall feels well    No new events or complaints since last visit   Overall the patient feels no major change from baseline symptoms   Similar symptoms as during last visit     Tolerating current medications well with no untoward side effects   Compliant with prescribed medication regimen. Tries to adhere to cardiac diet.      Mild chronic exertional shortness of breath on exertion relieved with rest  No significant cough or wheezing    No palpitations  No associated chest pain  Now with moderate pedal edema    No fever or chills  No significant expectoration    No hemoptysis  No presyncope or syncope   BP well controlled at doctors' offices      History of present illness:  Lio Velasquez is a 64 y.o. yo male with history of coronary artery disease Coronary artery bypass grafting stented coronary artery  who presents today for   Chief Complaint   Patient presents with   • Coronary Artery Disease     6 MON FU    .    History  Past Medical History:   Diagnosis Date   • Aortocoronary bypass status    • CAD in native artery    • Chest pain    • Coronary angioplasty status    • Hyperlipidemia     unspecified   • Hypertension, benign    • Obesity    • Sleep apnea     unspecified   ,   Past Surgical History:   Procedure Laterality Date   • ANKLE SURGERY      with screws    • CARDIAC CATHETERIZATION Left 03/05/2012 7/2016   • CARDIAC CATHETERIZATION Left 8/12/2019    Procedure: Cardiac Catheterization/Vascular Study BRIANNA OK;  Surgeon: Storm Encinas MD;  Location:  [facility] CATH  INVASIVE LOCATION;  Service: Cardiology   • COLONOSCOPY  12/30/2008   • COLONOSCOPY  12/30/2013    two polyps removed from the sigmoid colon   • COLONOSCOPY N/A 6/21/2019    Procedure: COLONOSCOPY WITH ANESTHESIA;  Surgeon: Marciano Jaramillo DO;  Location: [facility] ENDOSCOPY;  Service: Gastroenterology   • CORONARY ARTERY BYPASS GRAFT  1993    x2   • CORONARY STENT PLACEMENT  2019    X 3   • JOINT REPLACEMENT Left     left ankle   • SHOULDER SURGERY Right     with chano   • WRIST SURGERY Left    ,   Family History   Problem Relation Age of Onset   • Diabetes Mother         type II   • Heart failure Mother         congestive heart failure   • Colon polyps Neg Hx    • Esophageal cancer Neg Hx    • Rectal cancer Neg Hx    • Colon cancer Neg Hx    ,   Social History     Tobacco Use   • Smoking status: Never Smoker   • Smokeless tobacco: Never Used   Substance Use Topics   • Alcohol use: Yes     Comment: occasional   • Drug use: No   ,     Medications  Current Outpatient Medications   Medication Sig Dispense Refill   • aspirin 81 MG tablet Take 81 mg by mouth every night.     • folic acid-pyridoxine-cyanocobalamin (FOLTX) 2.5-25-2 MG tablet tablet Take 1 tablet by mouth every night.     • metoprolol succinate XL (TOPROL-XL) 50 MG 24 hr tablet Take 50 mg by mouth every night.     • Multiple Vitamins-Minerals (CENTRUM CARDIO PO) Take 2 doses by mouth every night.     • nitroglycerin (NITROSTAT) 0.4 MG SL tablet 1 under the tongue as needed for angina, may repeat q5mins for up three doses 100 tablet 11   • ramipril (ALTACE) 2.5 MG capsule Take 2.5 mg by mouth every night.     • rosuvastatin (CRESTOR) 10 MG tablet Take 1 tablet by mouth Every Night. 90 tablet 3   • ticagrelor (BRILINTA) 90 MG tablet tablet Take 1 tablet by mouth Every 12 (Twelve) Hours. 180 tablet 3   • Unable to find Med Name: CPAP     • Vortioxetine HBr (TRINTELLIX) 20 MG tablet Take  by mouth daily.       No current facility-administered medications for this  "visit.        Allergies:  Demerol [meperidine] and Morphine and related    Review of Systems  Review of Systems   Constitution: Positive for malaise/fatigue.   HENT: Negative.    Eyes: Negative.    Cardiovascular: Positive for dyspnea on exertion. Negative for chest pain, claudication, cyanosis, irregular heartbeat, leg swelling, near-syncope, orthopnea, palpitations, paroxysmal nocturnal dyspnea and syncope.   Respiratory: Negative.    Endocrine: Negative.    Hematologic/Lymphatic: Negative.    Skin: Negative.    Musculoskeletal: Positive for arthritis, back pain, joint pain and stiffness.   Gastrointestinal: Negative for anorexia.   Genitourinary: Negative.    Neurological: Positive for weakness.   Psychiatric/Behavioral: Negative.        Objective     Physical Exam:  /76   Pulse 57   Ht 182.9 cm (72\")   Wt 118 kg (260 lb)   BMI 35.26 kg/m²   Physical Exam   Constitutional: He appears well-developed.   HENT:   Head: Normocephalic.   Neck: Normal carotid pulses and no JVD present. No tracheal tenderness present. Carotid bruit is not present. No tracheal deviation and no edema present.   Cardiovascular: Regular rhythm, normal heart sounds and normal pulses.   Pulmonary/Chest: Effort normal. No stridor.   Abdominal: Soft. He exhibits no distension. There is no hepatosplenomegaly. There is no tenderness.     Vascular Status -  His right foot exhibits abnormal foot edema. His left foot exhibits abnormal foot edema.  Neurological: He is alert. He has normal strength. No cranial nerve deficit or sensory deficit.   Skin: Skin is warm.   Psychiatric: He has a normal mood and affect. His speech is normal and behavior is normal.   Lower extremity: 2 plus pitting edema.     Results Review:      Results for orders placed during the hospital encounter of 09/29/17   Adult Stress Echo W/ Cont or Stress Agent if Necessary Per Protocol    Narrative · Left ventricular systolic function is normal. Estimated EF = 55%.  · " Normal stress echo with no significant echocardiographic evidence for   myocardial ischemia.       cath 8/12/19     Conclusion of cardiac catheterization     Severe sequential stenosis of mid and distal saphenous venous graft to right coronary artery treated primary stenting using 3.5 x 38 mm Jamee drug-eluting stent  PTCA of the distal right coronary artery followed by implantation of 3.5 x 23 mm Jamee drug-eluting stent.  The very distal portion of the saphenous venous graft to the right coronary artery has a 50% stenosis.  ROSS-3 flow before and after procedure.  No symptoms.  Filter wire was used.        ____________________________________________________________________________________________________________________________________________     Plan after cardiac catheterization        Discontinue Plavix  Start Brilinta 180 mg now followed by 90 mg p.o. twice daily  Stop Zocor and start Crestor 20 mg daily  Keep LDL well below 70 mg/dL  Given multiple stent placement to saphenous venous graft to the right coronary artery much higher incidence of acute stent thrombosis.  If he has any chest pain to come to the emergency room immediately.     Intensive risk factor modifications for both primary and secondary prevention if applicable  Hydration   Observation          ECG 12 Lead  Date/Time: 8/10/2020 8:17 AM  Performed by: Storm Encinas MD  Authorized by: Storm Encinas MD   Comparison: compared with previous ECG from 11/14/2019  Similar to previous ECG  Rhythm: sinus bradycardia  Rate: bradycardic  Conduction: conduction normal  ST Segments: ST segments normal  T Waves: T waves normal  Other: no other findings    Clinical impression: abnormal EKG            Assessment/Plan   Patient Active Problem List   Diagnosis   • Coronary artery disease involving native coronary artery of native heart without angina pectoris   • Hyperlipemia   • Arthritis   • Essential hypertension   • History of colon polyps   • S/P CABG  (coronary artery bypass graft) X2 1993   • Stented coronary artery        ____________________________________________________________________________________________________________________________________________  Health maintenance and recommendations      Similar recommendations as last visit   Offered to give patient  a copy      Questions were encouraged, asked and answered to the patient's  understanding and satisfaction. Questions if any regarding current medications and side effects, need for refills and importance of compliance to medications stressed.    Reviewed available prior notes, consults, prior visits, laboratory findings, radiology and cardiology relevant reports. Updated chart as applicable. I have reviewed the patient's medical history in detail and updated the computerized patient record as relevant.      Updated patient regarding any new or relevant abnormalities on review of records or any new findings on physical exam. Mentioned to patient about purpose of visit and desirable health short and long term goals and objectives.    Primary to monitor CBC CMP Lipid panel and TSH as applicable    ___________________________________________________________________________________________________________________________________________    Plan        S/L NTG PRN for chest pain, call me or go to ER if has to use S/L nitroglycerin      Keep LDL below 70 mg/dl. Monitor liver and renal functions.   Monitor CBC, CMP, TSH (as indicated) and Lipid Panel by primary      Conitnue CPAP       Orders Placed This Encounter   Procedures   • ECG 12 Lead     This order was created via procedure documentation   • Adult Transthoracic Echo Complete W/ Cont if Necessary Per Protocol     Standing Status:   Future     Standing Expiration Date:   8/10/2021     Order Specific Question:   Reason for exam?     Answer:   Dyspnea        Requested Prescriptions     Signed Prescriptions Disp Refills   • rosuvastatin (CRESTOR) 10  MG tablet 90 tablet 3     Sig: Take 1 tablet by mouth Every Night.   • ticagrelor (BRILINTA) 90 MG tablet tablet 180 tablet 3     Sig: Take 1 tablet by mouth Every 12 (Twelve) Hours.         Follow up with Fatmata SOARES to address interim issues          Return in about 6 months (around 2/10/2021).

## 2020-09-01 ENCOUNTER — HOSPITAL ENCOUNTER (OUTPATIENT)
Dept: CARDIOLOGY | Facility: HOSPITAL | Age: 65
Discharge: HOME | End: 2020-09-01
Admitting: INTERNAL MEDICINE

## 2020-09-01 VITALS
HEIGHT: 72 IN | DIASTOLIC BLOOD PRESSURE: 76 MMHG | SYSTOLIC BLOOD PRESSURE: 112 MMHG | BODY MASS INDEX: 35.21 KG/M2 | WEIGHT: 260 LBS

## 2020-09-01 DIAGNOSIS — R06.09 DOE (DYSPNEA ON EXERTION): ICD-10-CM

## 2020-09-01 PROCEDURE — 25010000002 PERFLUTREN 6.52 MG/ML SUSPENSION: Performed by: INTERNAL MEDICINE

## 2020-09-01 PROCEDURE — 93306 TTE W/DOPPLER COMPLETE: CPT | Performed by: INTERNAL MEDICINE

## 2020-09-01 PROCEDURE — 93356 MYOCRD STRAIN IMG SPCKL TRCK: CPT | Performed by: INTERNAL MEDICINE

## 2020-09-01 PROCEDURE — 93356 MYOCRD STRAIN IMG SPCKL TRCK: CPT

## 2020-09-01 PROCEDURE — 93306 TTE W/DOPPLER COMPLETE: CPT

## 2020-09-01 RX ADMIN — PERFLUTREN: 6.52 INJECTION, SUSPENSION INTRAVENOUS at 09:17

## 2020-09-03 LAB
BH CV ECHO MEAS - AO MAX PG (FULL): 2.8 MMHG
BH CV ECHO MEAS - AO MAX PG: 5.1 MMHG
BH CV ECHO MEAS - AO MEAN PG (FULL): 1.5 MMHG
BH CV ECHO MEAS - AO MEAN PG: 2.5 MMHG
BH CV ECHO MEAS - AO ROOT AREA (BSA CORRECTED): 1.4
BH CV ECHO MEAS - AO ROOT AREA: 8.6 CM^2
BH CV ECHO MEAS - AO ROOT DIAM: 3.3 CM
BH CV ECHO MEAS - AO V2 MAX: 113 CM/SEC
BH CV ECHO MEAS - AO V2 MEAN: 72.4 CM/SEC
BH CV ECHO MEAS - AO V2 VTI: 24.6 CM
BH CV ECHO MEAS - AVA(I,A): 2.7 CM^2
BH CV ECHO MEAS - AVA(I,D): 2.7 CM^2
BH CV ECHO MEAS - AVA(V,A): 2.5 CM^2
BH CV ECHO MEAS - AVA(V,D): 2.5 CM^2
BH CV ECHO MEAS - BSA(HAYCOCK): 2.5 M^2
BH CV ECHO MEAS - BSA: 2.4 M^2
BH CV ECHO MEAS - BZI_BMI: 35.3 KILOGRAMS/M^2
BH CV ECHO MEAS - BZI_METRIC_HEIGHT: 182.9 CM
BH CV ECHO MEAS - BZI_METRIC_WEIGHT: 117.9 KG
BH CV ECHO MEAS - EDV(CUBED): 133.4 ML
BH CV ECHO MEAS - EDV(MOD-SP4): 138 ML
BH CV ECHO MEAS - EDV(TEICH): 124.4 ML
BH CV ECHO MEAS - EF(CUBED): 62 %
BH CV ECHO MEAS - EF(MOD-SP4): 53.8 %
BH CV ECHO MEAS - EF(TEICH): 53.3 %
BH CV ECHO MEAS - ESV(CUBED): 50.7 ML
BH CV ECHO MEAS - ESV(MOD-SP4): 63.7 ML
BH CV ECHO MEAS - ESV(TEICH): 58.1 ML
BH CV ECHO MEAS - FS: 27.6 %
BH CV ECHO MEAS - IVS/LVPW: 1.1
BH CV ECHO MEAS - IVSD: 1.2 CM
BH CV ECHO MEAS - LA DIMENSION: 4.6 CM
BH CV ECHO MEAS - LA/AO: 1.4
BH CV ECHO MEAS - LAT PEAK E' VEL: 11.5 CM/SEC
BH CV ECHO MEAS - LV DIASTOLIC VOL/BSA (35-75): 57.9 ML/M^2
BH CV ECHO MEAS - LV MASS(C)D: 211.9 GRAMS
BH CV ECHO MEAS - LV MASS(C)DI: 89 GRAMS/M^2
BH CV ECHO MEAS - LV MAX PG: 2.3 MMHG
BH CV ECHO MEAS - LV MEAN PG: 1 MMHG
BH CV ECHO MEAS - LV SYSTOLIC VOL/BSA (12-30): 26.7 ML/M^2
BH CV ECHO MEAS - LV V1 MAX: 75.2 CM/SEC
BH CV ECHO MEAS - LV V1 MEAN: 50.5 CM/SEC
BH CV ECHO MEAS - LV V1 VTI: 17.2 CM
BH CV ECHO MEAS - LVIDD: 5.1 CM
BH CV ECHO MEAS - LVIDS: 3.7 CM
BH CV ECHO MEAS - LVLD AP4: 8.4 CM
BH CV ECHO MEAS - LVLS AP4: 7.2 CM
BH CV ECHO MEAS - LVOT AREA (M): 3.8 CM^2
BH CV ECHO MEAS - LVOT AREA: 3.8 CM^2
BH CV ECHO MEAS - LVOT DIAM: 2.2 CM
BH CV ECHO MEAS - LVPWD: 1 CM
BH CV ECHO MEAS - MED PEAK E' VEL: 7.51 CM/SEC
BH CV ECHO MEAS - MR MAX PG: 31.7 MMHG
BH CV ECHO MEAS - MR MAX VEL: 277 CM/SEC
BH CV ECHO MEAS - MV A MAX VEL: 41.5 CM/SEC
BH CV ECHO MEAS - MV DEC TIME: 0.22 SEC
BH CV ECHO MEAS - MV E MAX VEL: 71.6 CM/SEC
BH CV ECHO MEAS - MV E/A: 1.7
BH CV ECHO MEAS - SI(AO): 88.2 ML/M^2
BH CV ECHO MEAS - SI(CUBED): 34.8 ML/M^2
BH CV ECHO MEAS - SI(LVOT): 27.5 ML/M^2
BH CV ECHO MEAS - SI(MOD-SP4): 31.2 ML/M^2
BH CV ECHO MEAS - SI(TEICH): 27.8 ML/M^2
BH CV ECHO MEAS - SV(AO): 210 ML
BH CV ECHO MEAS - SV(CUBED): 82.8 ML
BH CV ECHO MEAS - SV(LVOT): 65.4 ML
BH CV ECHO MEAS - SV(MOD-SP4): 74.3 ML
BH CV ECHO MEAS - SV(TEICH): 66.2 ML
BH CV ECHO MEAS - TAPSE (>1.6): 1.7 CM2
BH CV ECHO MEAS - TR MAX VEL: 142 CM/SEC
BH CV ECHO MEASUREMENTS AVERAGE E/E' RATIO: 7.53
LEFT ATRIUM VOLUME INDEX: 27.8 ML/M2
LEFT ATRIUM VOLUME: 66.2 CM3
LV EF 2D ECHO EST: 54 %
MAXIMAL PREDICTED HEART RATE: 157 BPM
STRESS TARGET HR: 133 BPM

## 2020-10-03 ENCOUNTER — APPOINTMENT (OUTPATIENT)
Dept: GENERAL RADIOLOGY | Facility: HOSPITAL | Age: 65
End: 2020-10-03

## 2020-10-03 ENCOUNTER — HOSPITAL ENCOUNTER (INPATIENT)
Facility: HOSPITAL | Age: 65
LOS: 2 days | Discharge: HOME | End: 2020-10-05
Attending: EMERGENCY MEDICINE | Admitting: INTERNAL MEDICINE

## 2020-10-03 DIAGNOSIS — I21.4 NSTEMI (NON-ST ELEVATED MYOCARDIAL INFARCTION) (HCC): Primary | ICD-10-CM

## 2020-10-03 DIAGNOSIS — R07.2 PRECORDIAL PAIN: ICD-10-CM

## 2020-10-03 LAB
ALBUMIN SERPL-MCNC: 4.5 G/DL (ref 3.5–5.2)
ALBUMIN/GLOB SERPL: 1.8 G/DL
ALP SERPL-CCNC: 71 U/L (ref 39–117)
ALT SERPL W P-5'-P-CCNC: 25 U/L (ref 1–41)
ANION GAP SERPL CALCULATED.3IONS-SCNC: 10 MMOL/L (ref 5–15)
AST SERPL-CCNC: 30 U/L (ref 1–40)
BASOPHILS # BLD AUTO: 0.06 10*3/MM3 (ref 0–0.2)
BASOPHILS NFR BLD AUTO: 0.9 % (ref 0–1.5)
BILIRUB SERPL-MCNC: 0.6 MG/DL (ref 0–1.2)
BUN SERPL-MCNC: 13 MG/DL (ref 8–23)
BUN/CREAT SERPL: 15.9 (ref 7–25)
CALCIUM SPEC-SCNC: 10 MG/DL (ref 8.6–10.5)
CHLORIDE SERPL-SCNC: 105 MMOL/L (ref 98–107)
CO2 SERPL-SCNC: 24 MMOL/L (ref 22–29)
CREAT SERPL-MCNC: 0.82 MG/DL (ref 0.76–1.27)
DEPRECATED RDW RBC AUTO: 41.6 FL (ref 37–54)
EOSINOPHIL # BLD AUTO: 0.11 10*3/MM3 (ref 0–0.4)
EOSINOPHIL NFR BLD AUTO: 1.7 % (ref 0.3–6.2)
ERYTHROCYTE [DISTWIDTH] IN BLOOD BY AUTOMATED COUNT: 12.7 % (ref 12.3–15.4)
GFR SERPL CREATININE-BSD FRML MDRD: 95 ML/MIN/1.73
GLOBULIN UR ELPH-MCNC: 2.5 GM/DL
GLUCOSE SERPL-MCNC: 138 MG/DL (ref 65–99)
HCT VFR BLD AUTO: 39.8 % (ref 37.5–51)
HGB BLD-MCNC: 13.9 G/DL (ref 13–17.7)
HOLD SPECIMEN: NORMAL
HOLD SPECIMEN: NORMAL
IMM GRANULOCYTES # BLD AUTO: 0.01 10*3/MM3 (ref 0–0.05)
IMM GRANULOCYTES NFR BLD AUTO: 0.2 % (ref 0–0.5)
INR PPP: 1.06 (ref 0.91–1.09)
LYMPHOCYTES # BLD AUTO: 2.46 10*3/MM3 (ref 0.7–3.1)
LYMPHOCYTES NFR BLD AUTO: 37.7 % (ref 19.6–45.3)
MCH RBC QN AUTO: 31.7 PG (ref 26.6–33)
MCHC RBC AUTO-ENTMCNC: 34.9 G/DL (ref 31.5–35.7)
MCV RBC AUTO: 90.9 FL (ref 79–97)
MONOCYTES # BLD AUTO: 0.85 10*3/MM3 (ref 0.1–0.9)
MONOCYTES NFR BLD AUTO: 13 % (ref 5–12)
NEUTROPHILS NFR BLD AUTO: 3.04 10*3/MM3 (ref 1.7–7)
NEUTROPHILS NFR BLD AUTO: 46.5 % (ref 42.7–76)
NRBC BLD AUTO-RTO: 0 /100 WBC (ref 0–0.2)
PLATELET # BLD AUTO: 162 10*3/MM3 (ref 140–450)
PMV BLD AUTO: 11.4 FL (ref 6–12)
POTASSIUM SERPL-SCNC: 3.8 MMOL/L (ref 3.5–5.2)
PROT SERPL-MCNC: 7 G/DL (ref 6–8.5)
PROTHROMBIN TIME: 13.4 SECONDS (ref 11.9–14.6)
RBC # BLD AUTO: 4.38 10*6/MM3 (ref 4.14–5.8)
SARS-COV-2 RNA PNL SPEC NAA+PROBE: NOT DETECTED
SODIUM SERPL-SCNC: 139 MMOL/L (ref 136–145)
TROPONIN T SERPL-MCNC: 0.14 NG/ML (ref 0–0.03)
TROPONIN T SERPL-MCNC: 0.19 NG/ML (ref 0–0.03)
WBC # BLD AUTO: 6.53 10*3/MM3 (ref 3.4–10.8)
WHOLE BLOOD HOLD SPECIMEN: NORMAL
WHOLE BLOOD HOLD SPECIMEN: NORMAL

## 2020-10-03 PROCEDURE — 71045 X-RAY EXAM CHEST 1 VIEW: CPT

## 2020-10-03 PROCEDURE — G0378 HOSPITAL OBSERVATION PER HR: HCPCS

## 2020-10-03 PROCEDURE — 84484 ASSAY OF TROPONIN QUANT: CPT

## 2020-10-03 PROCEDURE — 85025 COMPLETE CBC W/AUTO DIFF WBC: CPT

## 2020-10-03 PROCEDURE — 85610 PROTHROMBIN TIME: CPT | Performed by: INTERNAL MEDICINE

## 2020-10-03 PROCEDURE — 87635 SARS-COV-2 COVID-19 AMP PRB: CPT | Performed by: EMERGENCY MEDICINE

## 2020-10-03 PROCEDURE — 99284 EMERGENCY DEPT VISIT MOD MDM: CPT

## 2020-10-03 PROCEDURE — 84484 ASSAY OF TROPONIN QUANT: CPT | Performed by: EMERGENCY MEDICINE

## 2020-10-03 PROCEDURE — 25010000002 ENOXAPARIN PER 10 MG: Performed by: INTERNAL MEDICINE

## 2020-10-03 PROCEDURE — 93005 ELECTROCARDIOGRAM TRACING: CPT | Performed by: EMERGENCY MEDICINE

## 2020-10-03 PROCEDURE — 80053 COMPREHEN METABOLIC PANEL: CPT

## 2020-10-03 PROCEDURE — 93010 ELECTROCARDIOGRAM REPORT: CPT | Performed by: INTERNAL MEDICINE

## 2020-10-03 PROCEDURE — 93005 ELECTROCARDIOGRAM TRACING: CPT

## 2020-10-03 RX ORDER — SODIUM CHLORIDE 0.9 % (FLUSH) 0.9 %
10 SYRINGE (ML) INJECTION AS NEEDED
Status: DISCONTINUED | OUTPATIENT
Start: 2020-10-03 | End: 2020-10-05 | Stop reason: HOSPADM

## 2020-10-03 RX ORDER — SODIUM CHLORIDE 9 MG/ML
50 INJECTION, SOLUTION INTRAVENOUS CONTINUOUS
Status: DISCONTINUED | OUTPATIENT
Start: 2020-10-03 | End: 2020-10-05 | Stop reason: HOSPADM

## 2020-10-03 RX ORDER — ASPIRIN 81 MG/1
81 TABLET ORAL DAILY
Status: DISCONTINUED | OUTPATIENT
Start: 2020-10-04 | End: 2020-10-05 | Stop reason: HOSPADM

## 2020-10-03 RX ORDER — NITROGLYCERIN 20 MG/100ML
10-50 INJECTION INTRAVENOUS
Status: DISCONTINUED | OUTPATIENT
Start: 2020-10-03 | End: 2020-10-05 | Stop reason: HOSPADM

## 2020-10-03 RX ORDER — RAMIPRIL 1.25 MG/1
2.5 CAPSULE ORAL NIGHTLY
Status: DISCONTINUED | OUTPATIENT
Start: 2020-10-03 | End: 2020-10-05 | Stop reason: HOSPADM

## 2020-10-03 RX ORDER — SODIUM CHLORIDE 0.9 % (FLUSH) 0.9 %
10 SYRINGE (ML) INJECTION EVERY 12 HOURS SCHEDULED
Status: DISCONTINUED | OUTPATIENT
Start: 2020-10-03 | End: 2020-10-05 | Stop reason: HOSPADM

## 2020-10-03 RX ORDER — ASPIRIN 81 MG/1
324 TABLET, CHEWABLE ORAL ONCE
Status: COMPLETED | OUTPATIENT
Start: 2020-10-03 | End: 2020-10-03

## 2020-10-03 RX ORDER — ROSUVASTATIN CALCIUM 10 MG/1
10 TABLET, COATED ORAL NIGHTLY
Status: DISCONTINUED | OUTPATIENT
Start: 2020-10-03 | End: 2020-10-05 | Stop reason: HOSPADM

## 2020-10-03 RX ORDER — TRAZODONE HYDROCHLORIDE 50 MG/1
50 TABLET ORAL NIGHTLY PRN
Status: DISCONTINUED | OUTPATIENT
Start: 2020-10-03 | End: 2020-10-05 | Stop reason: HOSPADM

## 2020-10-03 RX ORDER — METOPROLOL SUCCINATE 50 MG/1
50 TABLET, EXTENDED RELEASE ORAL NIGHTLY
Status: DISCONTINUED | OUTPATIENT
Start: 2020-10-03 | End: 2020-10-05 | Stop reason: HOSPADM

## 2020-10-03 RX ADMIN — ENOXAPARIN SODIUM 120 MG: 120 INJECTION SUBCUTANEOUS at 19:26

## 2020-10-03 RX ADMIN — TICAGRELOR 90 MG: 90 TABLET ORAL at 21:11

## 2020-10-03 RX ADMIN — SODIUM CHLORIDE 50 ML/HR: 9 INJECTION, SOLUTION INTRAVENOUS at 19:26

## 2020-10-03 RX ADMIN — NITROGLYCERIN 10 MCG/MIN: 20 INJECTION INTRAVENOUS at 21:12

## 2020-10-03 RX ADMIN — ROSUVASTATIN CALCIUM 10 MG: 10 TABLET, FILM COATED ORAL at 21:11

## 2020-10-03 RX ADMIN — ASPIRIN 81 MG 324 MG: 81 TABLET ORAL at 18:30

## 2020-10-03 RX ADMIN — METOPROLOL SUCCINATE 50 MG: 50 TABLET, EXTENDED RELEASE ORAL at 21:12

## 2020-10-03 RX ADMIN — RAMIPRIL 2.5 MG: 1.25 CAPSULE ORAL at 21:11

## 2020-10-03 RX ADMIN — SODIUM CHLORIDE, PRESERVATIVE FREE 10 ML: 5 INJECTION INTRAVENOUS at 21:12

## 2020-10-03 NOTE — ED PROVIDER NOTES
Subjective   This is a 64-year-old gentleman with a past medical history of hypertension, hyperlipidemia, coronary artery disease who presents the emergency department chief complaint of chest pain.  Patient states he had a heavy day of work yesterday and began to experience chest pain that awakened him from sleep last night.  He complains of an intermittent dull anterior wall chest pain.  Worse with exertion, completely resolves with rest.  Associated with nausea, and fatigue.  He denies any leg pain, leg swelling, numbness, weakness, paresthesias, fevers, chills, or cough.    Past medical history: Hypertension, hyperlipidemia, CAD  Social history: Denies tobacco, alcohol, or illicit drug use      History provided by:  Patient      Review of Systems   All other systems reviewed and are negative.      Past Medical History:   Diagnosis Date   • Aortocoronary bypass status    • CAD in native artery    • Chest pain    • Coronary angioplasty status    • Hyperlipidemia     unspecified   • Hypertension, benign    • Obesity    • Sleep apnea     unspecified       Allergies   Allergen Reactions   • Demerol [Meperidine]    • Morphine And Related Itching       Past Surgical History:   Procedure Laterality Date   • ANKLE SURGERY      with screws    • CARDIAC CATHETERIZATION Left 03/05/2012 7/2016   • CARDIAC CATHETERIZATION Left 8/12/2019    Procedure: Cardiac Catheterization/Vascular Study BRIANNA OK;  Surgeon: Storm Encinas MD;  Location: [facility] CATH INVASIVE LOCATION;  Service: Cardiology   • COLONOSCOPY  12/30/2008   • COLONOSCOPY  12/30/2013    two polyps removed from the sigmoid colon   • COLONOSCOPY N/A 6/21/2019    Procedure: COLONOSCOPY WITH ANESTHESIA;  Surgeon: Marciano Jaramillo DO;  Location: [facility] ENDOSCOPY;  Service: Gastroenterology   • CORONARY ARTERY BYPASS GRAFT  1993    x2   • CORONARY STENT PLACEMENT  2019    X 3   • JOINT REPLACEMENT Left     left ankle   • SHOULDER SURGERY Right     with chano   • WRIST  SURGERY Left        Family History   Problem Relation Age of Onset   • Diabetes Mother         type II   • Heart failure Mother         congestive heart failure   • Colon polyps Neg Hx    • Esophageal cancer Neg Hx    • Rectal cancer Neg Hx    • Colon cancer Neg Hx        Social History     Socioeconomic History   • Marital status:      Spouse name: Not on file   • Number of children: Not on file   • Years of education: Not on file   • Highest education level: Not on file   Tobacco Use   • Smoking status: Never Smoker   • Smokeless tobacco: Never Used   Substance and Sexual Activity   • Alcohol use: Yes     Comment: occasional   • Drug use: No   • Sexual activity: Defer           Objective   Physical Exam  Vitals signs and nursing note reviewed.   Constitutional:       General: He is not in acute distress.     Appearance: He is well-developed. He is not diaphoretic.   HENT:      Head: Normocephalic and atraumatic.   Eyes:      General:         Right eye: No discharge.         Left eye: No discharge.      Conjunctiva/sclera: Conjunctivae normal.   Neck:      Musculoskeletal: Normal range of motion and neck supple.      Vascular: No JVD.      Trachea: No tracheal deviation.   Cardiovascular:      Rate and Rhythm: Normal rate and regular rhythm.      Pulses:           Radial pulses are 2+ on the right side and 2+ on the left side.      Heart sounds: Normal heart sounds. No murmur. No friction rub. No gallop.    Pulmonary:      Effort: Pulmonary effort is normal.      Breath sounds: Normal breath sounds. No stridor. No decreased breath sounds, wheezing, rhonchi or rales.   Abdominal:      General: Bowel sounds are normal. There is no distension.      Palpations: Abdomen is soft.      Tenderness: There is no abdominal tenderness.   Musculoskeletal: Normal range of motion.         General: No deformity.      Right lower leg: He exhibits no tenderness. No edema.      Left lower leg: He exhibits no tenderness. No  edema.   Skin:     General: Skin is warm and dry.      Capillary Refill: Capillary refill takes less than 2 seconds.      Coloration: Skin is not pale.      Findings: No rash.   Neurological:      Mental Status: He is alert.   Psychiatric:         Behavior: Behavior normal.         Procedures           ED Course                                           MDM  Number of Diagnoses or Management Options  NSTEMI (non-ST elevated myocardial infarction) (CMS/[facility]): new and requires workup  Precordial pain: new and requires workup  Diagnosis management comments: Patient presents with chest pain shortness of breath.  Upon arrival in no acute distress vital signs are reassuring.  IV access is obtained and labs are sent.  He is given 324 mg of aspirin.His chest x-ray shows no acute findings.  Lab work overall is reassuring with a CMP with no gross electrolyte abnormalities, no signs of kidney injury, no elevation anion gap, CBC is unremarkable, unfortunately his troponin is elevated at 0.145.  He has been given aspirin.  He is pain-free at this time so we will defer nitroglycerin.  He is not hypertensive at this time.  Low concern for aortic dissection with no thunderclap pain, no tearing or ripping pain, no back pain, no wide mediastinum on chest x-ray, strong bilateral radial pulses.  Low concern for pulmonary embolism with no history of PE, DVT, or malignancy, no recent immobilization, no leg pain or leg swelling, no hypoxia, no tachypnea, no tachycardia.  Low concern for myocarditis no fever no Tachycardia.  Low concern for pneumonia with no fevers, chills, cough, and no findings of this on physical exam or chest x-ray.  Low concern for cardiac tamponade with no hypotension, no tachycardia, no elevated JVD, no muffled heart sounds, no risk factors for pericardial effusion.  Low concern for esophageal tear with no recent nausea or vomiting, no recent instrumentation, no findings of this on physical exam or chest x-ray.  It  appears this is a primary coronary event.  I discussed the case with Dr. Vizcaino and the patient has been admitted in stable condition.       Amount and/or Complexity of Data Reviewed  Clinical lab tests: ordered and reviewed  Tests in the radiology section of CPT®: ordered and reviewed  Discuss the patient with other providers: yes (Dr. Encinas)  Independent visualization of images, tracings, or specimens: yes (EKG shows normal sinus rhythm at a rate of 65 with no ST elevation or depression concerning for acute ischemia, T wave inversions in lead III and aVF, narrow QRS, QT within normal limits, WV within normal limits.  When compared with ECG on August 10 T wave inversions appear similar in lead III and F.)    Risk of Complications, Morbidity, and/or Mortality  Presenting problems: high  Diagnostic procedures: low  Management options: high    Patient Progress  Patient progress: improved      Final diagnoses:   NSTEMI (non-ST elevated myocardial infarction) (CMS/[facility])   Precordial pain            Kamran Kern MD  10/03/20 7201

## 2020-10-04 PROBLEM — G47.33 OBSTRUCTIVE SLEEP APNEA ON CPAP: Chronic | Status: ACTIVE | Noted: 2020-10-04

## 2020-10-04 PROBLEM — I10 ESSENTIAL HYPERTENSION: Chronic | Status: ACTIVE | Noted: 2017-02-28

## 2020-10-04 PROBLEM — Z99.89 OBSTRUCTIVE SLEEP APNEA ON CPAP: Chronic | Status: ACTIVE | Noted: 2020-10-04

## 2020-10-04 LAB
CHOLEST SERPL-MCNC: 119 MG/DL (ref 0–200)
HDLC SERPL-MCNC: 28 MG/DL (ref 40–60)
LDLC SERPL CALC-MCNC: 53 MG/DL (ref 0–100)
LDLC/HDLC SERPL: 1.88 {RATIO}
TRIGL SERPL-MCNC: 192 MG/DL (ref 0–150)
TROPONIN T SERPL-MCNC: 0.2 NG/ML (ref 0–0.03)
VLDLC SERPL-MCNC: 38.4 MG/DL

## 2020-10-04 PROCEDURE — 84484 ASSAY OF TROPONIN QUANT: CPT | Performed by: NURSE PRACTITIONER

## 2020-10-04 PROCEDURE — 93005 ELECTROCARDIOGRAM TRACING: CPT | Performed by: INTERNAL MEDICINE

## 2020-10-04 PROCEDURE — 99152 MOD SED SAME PHYS/QHP 5/>YRS: CPT | Performed by: INTERNAL MEDICINE

## 2020-10-04 PROCEDURE — 93459 L HRT ART/GRFT ANGIO: CPT | Performed by: INTERNAL MEDICINE

## 2020-10-04 PROCEDURE — 25010000002 DIPHENHYDRAMINE PER 50 MG: Performed by: INTERNAL MEDICINE

## 2020-10-04 PROCEDURE — 25010000002 HEPARIN (PORCINE): Performed by: INTERNAL MEDICINE

## 2020-10-04 PROCEDURE — 99223 1ST HOSP IP/OBS HIGH 75: CPT | Performed by: INTERNAL MEDICINE

## 2020-10-04 PROCEDURE — C1760 CLOSURE DEV, VASC: HCPCS | Performed by: INTERNAL MEDICINE

## 2020-10-04 PROCEDURE — 25010000002 ENOXAPARIN PER 10 MG: Performed by: INTERNAL MEDICINE

## 2020-10-04 PROCEDURE — 94799 UNLISTED PULMONARY SVC/PX: CPT

## 2020-10-04 PROCEDURE — B2120ZZ FLUOROSCOPY OF SINGLE CORONARY ARTERY BYPASS GRAFT USING HIGH OSMOLAR CONTRAST: ICD-10-PCS | Performed by: INTERNAL MEDICINE

## 2020-10-04 PROCEDURE — 0 IOPAMIDOL PER 1 ML: Performed by: INTERNAL MEDICINE

## 2020-10-04 PROCEDURE — 25010000002 MIDAZOLAM HCL (PF) 5 MG/5ML SOLUTION: Performed by: INTERNAL MEDICINE

## 2020-10-04 PROCEDURE — 99153 MOD SED SAME PHYS/QHP EA: CPT | Performed by: INTERNAL MEDICINE

## 2020-10-04 PROCEDURE — B2110ZZ FLUOROSCOPY OF MULTIPLE CORONARY ARTERIES USING HIGH OSMOLAR CONTRAST: ICD-10-PCS | Performed by: INTERNAL MEDICINE

## 2020-10-04 PROCEDURE — B2150ZZ FLUOROSCOPY OF LEFT HEART USING HIGH OSMOLAR CONTRAST: ICD-10-PCS | Performed by: INTERNAL MEDICINE

## 2020-10-04 PROCEDURE — C1894 INTRO/SHEATH, NON-LASER: HCPCS | Performed by: INTERNAL MEDICINE

## 2020-10-04 PROCEDURE — C1769 GUIDE WIRE: HCPCS | Performed by: INTERNAL MEDICINE

## 2020-10-04 PROCEDURE — 25010000002 FENTANYL CITRATE (PF) 100 MCG/2ML SOLUTION: Performed by: INTERNAL MEDICINE

## 2020-10-04 PROCEDURE — 4A023N7 MEASUREMENT OF CARDIAC SAMPLING AND PRESSURE, LEFT HEART, PERCUTANEOUS APPROACH: ICD-10-PCS | Performed by: INTERNAL MEDICINE

## 2020-10-04 PROCEDURE — 80061 LIPID PANEL: CPT | Performed by: NURSE PRACTITIONER

## 2020-10-04 RX ORDER — DIPHENHYDRAMINE HYDROCHLORIDE 50 MG/ML
INJECTION INTRAMUSCULAR; INTRAVENOUS AS NEEDED
Status: DISCONTINUED | OUTPATIENT
Start: 2020-10-04 | End: 2020-10-04 | Stop reason: HOSPADM

## 2020-10-04 RX ORDER — ACETAMINOPHEN 325 MG/1
650 TABLET ORAL EVERY 4 HOURS PRN
Status: DISCONTINUED | OUTPATIENT
Start: 2020-10-04 | End: 2020-10-05 | Stop reason: HOSPADM

## 2020-10-04 RX ORDER — FENTANYL CITRATE 50 UG/ML
INJECTION, SOLUTION INTRAMUSCULAR; INTRAVENOUS AS NEEDED
Status: DISCONTINUED | OUTPATIENT
Start: 2020-10-04 | End: 2020-10-04 | Stop reason: HOSPADM

## 2020-10-04 RX ORDER — SODIUM CHLORIDE 9 MG/ML
100 INJECTION, SOLUTION INTRAVENOUS CONTINUOUS
Status: DISCONTINUED | OUTPATIENT
Start: 2020-10-04 | End: 2020-10-05 | Stop reason: HOSPADM

## 2020-10-04 RX ORDER — MIDAZOLAM HYDROCHLORIDE 1 MG/ML
INJECTION, SOLUTION INTRAMUSCULAR; INTRAVENOUS AS NEEDED
Status: DISCONTINUED | OUTPATIENT
Start: 2020-10-04 | End: 2020-10-04 | Stop reason: HOSPADM

## 2020-10-04 RX ADMIN — TICAGRELOR 90 MG: 90 TABLET ORAL at 08:17

## 2020-10-04 RX ADMIN — ASPIRIN 81 MG: 81 TABLET ORAL at 08:17

## 2020-10-04 RX ADMIN — TICAGRELOR 90 MG: 90 TABLET ORAL at 21:12

## 2020-10-04 RX ADMIN — SODIUM CHLORIDE, PRESERVATIVE FREE 10 ML: 5 INJECTION INTRAVENOUS at 08:19

## 2020-10-04 RX ADMIN — SODIUM CHLORIDE 100 ML/HR: 9 INJECTION, SOLUTION INTRAVENOUS at 13:52

## 2020-10-04 RX ADMIN — ROSUVASTATIN CALCIUM 10 MG: 10 TABLET, FILM COATED ORAL at 21:12

## 2020-10-04 RX ADMIN — METOPROLOL SUCCINATE 50 MG: 50 TABLET, EXTENDED RELEASE ORAL at 21:12

## 2020-10-04 RX ADMIN — ENOXAPARIN SODIUM 120 MG: 120 INJECTION SUBCUTANEOUS at 08:17

## 2020-10-04 RX ADMIN — RAMIPRIL 2.5 MG: 1.25 CAPSULE ORAL at 21:12

## 2020-10-04 RX ADMIN — SODIUM CHLORIDE 50 ML/HR: 9 INJECTION, SOLUTION INTRAVENOUS at 12:46

## 2020-10-04 NOTE — PLAN OF CARE
Goal Outcome Evaluation:  Plan of Care Reviewed With: patient  Progress: no change  Outcome Summary: no complaints of pain. right heart cath today. cath site is c/d/i. fluids infusing. wife at bedside. anticipates home tomorrow.

## 2020-10-04 NOTE — PLAN OF CARE
Goal Outcome Evaluation:  Plan of Care Reviewed With: patient  Progress: no change  Outcome Summary: VSS, no c/o pain, NPO per order SB-S 58-72 with coup on telemetry

## 2020-10-04 NOTE — H&P
[facility] HEART GROUP HISTORY AND PHYSICAL      PCP: Dr. Bridger Rosales    Chief complaint: Chest Pain    History of Present Illness:   Mr. Velasquez is a 64 year old male with known CAD, having CABG with recent PCI/Stent to SVG RCA on 08/12/2019 who presented to the ER with complaints of chest pain that woke him up from sleep. He stated he has had occasional shortness of breath with activity, along with decreased endurance.     Work-up in the ER included cardiac enzymes that were slightly elevated. EKG demonstrated T wave inversion in inferior leads, unchanged from previous EKGs. Labs and CXR were unremarkable.     Patient admitted to cardiology with NSTEMI.       REVIEW OF SYSTEMS:  Constitutional: Denies fever or chills. Denies change in energy level or malaise. Easily fatigue, decreased endurance over the past month.   HEENT: Denies headaches or visual disturbances. Denies difficulty swallowing or sore throat.  Respiratory: Denies cough or hoarseness. + shortness of breath with exertion.  Cardiovascular: Chest pain that woke him from sleep. Denies palpitations. Denies presyncope/syncope. Denies orthopnea/PND. Mild bilateral ankle/pedal edema.   Gastrointestinal: Denies abdominal pain. Denies nausea/vomiting. Denies change in bowel habits or history of recent GI tract blood loss.   Genitourinary: Denies urinary urgency or frequency. Denies dysuria, hematuria.  Musculoskeletal: Denies pain or swelling in joints.  Neurological: Denies paresthesias. Denies headache. Denies seizure or stroke symptoms.  Behavioral/Psych: Denies problems with anxiety or depression.    All other systems are negative except where stated above.      History  Past Medical History:   Diagnosis Date   • Aortocoronary bypass status    • CAD in native artery    • Chest pain    • Coronary angioplasty status    • Hyperlipidemia     unspecified   • Hypertension, benign    • Obesity    • Sleep apnea     unspecified     Past Surgical  History:   Procedure Laterality Date   • ANKLE SURGERY      with screws    • CARDIAC CATHETERIZATION Left 03/05/2012 7/2016   • CARDIAC CATHETERIZATION Left 8/12/2019    Procedure: Cardiac Catheterization/Vascular Study BRIANNA OK;  Surgeon: Storm Encinas MD;  Location: [facility] CATH INVASIVE LOCATION;  Service: Cardiology   • COLONOSCOPY  12/30/2008   • COLONOSCOPY  12/30/2013    two polyps removed from the sigmoid colon   • COLONOSCOPY N/A 6/21/2019    Procedure: COLONOSCOPY WITH ANESTHESIA;  Surgeon: Marciano Jaramillo DO;  Location: [facility] ENDOSCOPY;  Service: Gastroenterology   • CORONARY ARTERY BYPASS GRAFT  1993    x2   • CORONARY STENT PLACEMENT  2019    X 3   • JOINT REPLACEMENT Left     left ankle   • SHOULDER SURGERY Right     with chano   • WRIST SURGERY Left      Family History   Problem Relation Age of Onset   • Diabetes Mother         type II   • Heart failure Mother         congestive heart failure   • Colon polyps Neg Hx    • Esophageal cancer Neg Hx    • Rectal cancer Neg Hx    • Colon cancer Neg Hx      Social History     Tobacco Use   • Smoking status: Never Smoker   • Smokeless tobacco: Never Used   Substance Use Topics   • Alcohol use: Yes     Comment: occasional   • Drug use: No     Medications Prior to Admission   Medication Sig Dispense Refill Last Dose   • aspirin 81 MG tablet Take 81 mg by mouth every night.      • folic acid-pyridoxine-cyanocobalamin (FOLTX) 2.5-25-2 MG tablet tablet Take 1 tablet by mouth every night.      • metoprolol succinate XL (TOPROL-XL) 50 MG 24 hr tablet Take 50 mg by mouth every night.      • Multiple Vitamins-Minerals (CENTRUM CARDIO PO) Take 2 doses by mouth every night.      • nitroglycerin (NITROSTAT) 0.4 MG SL tablet 1 under the tongue as needed for angina, may repeat q5mins for up three doses 100 tablet 11    • ramipril (ALTACE) 2.5 MG capsule Take 2.5 mg by mouth every night.      • rosuvastatin (CRESTOR) 10 MG tablet Take 1 tablet by mouth Every Night. 90  tablet 3    • ticagrelor (BRILINTA) 90 MG tablet tablet Take 1 tablet by mouth Every 12 (Twelve) Hours. 180 tablet 3    • Unable to find Med Name: CPAP      • Vortioxetine HBr (TRINTELLIX) 20 MG tablet Take 20 mg by mouth Daily.        Allergies:  Demerol [meperidine] and Morphine and related    Objective     Vital Signs  Temp:  [97.5 °F (36.4 °C)-98.2 °F (36.8 °C)] 97.6 °F (36.4 °C)  Heart Rate:  [56-64] 56  Resp:  [16-18] 16  BP: (109-147)/(65-85) 111/68    Physical Exam:      General Appearance:    Alert, cooperative, in no acute distress   HEENT:    Normocephalic. Atraumatic. LEANDRO.    Neck:   No adenopathy, supple, trachea midline, no thyromegaly, no carotid bruit, no JVD   Lungs:     Clear to auscultation, respirations regular, even and unlabored    Heart:    Regular rhythm and normal rate, normal S1 and S2, no murmur, no gallop, no rub, no click   Abdomen:     Normal bowel sounds, no masses, no organomegaly, soft, non-tender, non-distended, no guarding, no rebound tenderness   Extremities:   Moves all extremities, no edema, no cyanosis, no redness   Pulses:   Pulses palpable and equal bilaterally   Skin:   No bleeding, bruising or rash   Lymph nodes:   No palpable adenopathy   Neurologic:   Cranial nerves 2 - 12 grossly intact      Results Review:   1.  EKG - SR with Inferior T wave changes - Unchanged from previous EKGs  2.  Troponin - 0.145, 0.187    Assessment/Plan       NSTEMI (non-ST elevated myocardial infarction) (CMS/HCC) with Known Coronary artery disease involving native coronary artery of native heart with unstable angina pectoris (CMS/HCC) - Recent PCI/BRIANNA to SVG-RCA on 08/12/2020. On ASA, Brilinta, BB, ACE-I, Statin    Essential hypertension - Controlled.     Mixed hyperlipidemia - on Statin. Check Lipid Panel with LDL < 70    Obstructive sleep apnea on CPAP    Repeat troponin this morning. Cardiac catheterization today.     I discussed the patients findings and my recommendations with patient.  Please note that this documentation resulted in a face-to-face encounter provided by me.       RODGER Dailey  10/04/20  10:15 CDT

## 2020-10-05 ENCOUNTER — APPOINTMENT (OUTPATIENT)
Dept: CARDIOLOGY | Facility: HOSPITAL | Age: 65
End: 2020-10-05

## 2020-10-05 VITALS
HEART RATE: 70 BPM | WEIGHT: 255.73 LBS | SYSTOLIC BLOOD PRESSURE: 115 MMHG | OXYGEN SATURATION: 97 % | BODY MASS INDEX: 34.64 KG/M2 | TEMPERATURE: 97.7 F | RESPIRATION RATE: 18 BRPM | HEIGHT: 72 IN | DIASTOLIC BLOOD PRESSURE: 70 MMHG

## 2020-10-05 LAB
ANION GAP SERPL CALCULATED.3IONS-SCNC: 9 MMOL/L (ref 5–15)
BUN SERPL-MCNC: 11 MG/DL (ref 8–23)
BUN/CREAT SERPL: 14.3 (ref 7–25)
CALCIUM SPEC-SCNC: 8.9 MG/DL (ref 8.6–10.5)
CHLORIDE SERPL-SCNC: 106 MMOL/L (ref 98–107)
CO2 SERPL-SCNC: 24 MMOL/L (ref 22–29)
CREAT SERPL-MCNC: 0.77 MG/DL (ref 0.76–1.27)
DEPRECATED RDW RBC AUTO: 42.5 FL (ref 37–54)
ERYTHROCYTE [DISTWIDTH] IN BLOOD BY AUTOMATED COUNT: 12.7 % (ref 12.3–15.4)
GFR SERPL CREATININE-BSD FRML MDRD: 102 ML/MIN/1.73
GLUCOSE SERPL-MCNC: 133 MG/DL (ref 65–99)
HBA1C MFR BLD: 6.6 % (ref 4.8–5.6)
HCT VFR BLD AUTO: 38.6 % (ref 37.5–51)
HGB BLD-MCNC: 13.4 G/DL (ref 13–17.7)
MCH RBC QN AUTO: 31.8 PG (ref 26.6–33)
MCHC RBC AUTO-ENTMCNC: 34.7 G/DL (ref 31.5–35.7)
MCV RBC AUTO: 91.7 FL (ref 79–97)
PLATELET # BLD AUTO: 142 10*3/MM3 (ref 140–450)
PMV BLD AUTO: 11.7 FL (ref 6–12)
POTASSIUM SERPL-SCNC: 3.8 MMOL/L (ref 3.5–5.2)
RBC # BLD AUTO: 4.21 10*6/MM3 (ref 4.14–5.8)
SODIUM SERPL-SCNC: 139 MMOL/L (ref 136–145)
WBC # BLD AUTO: 7.1 10*3/MM3 (ref 3.4–10.8)

## 2020-10-05 PROCEDURE — 25010000002 ENOXAPARIN PER 10 MG: Performed by: INTERNAL MEDICINE

## 2020-10-05 PROCEDURE — 93306 TTE W/DOPPLER COMPLETE: CPT

## 2020-10-05 PROCEDURE — 25010000002 PERFLUTREN 6.52 MG/ML SUSPENSION: Performed by: INTERNAL MEDICINE

## 2020-10-05 PROCEDURE — 85027 COMPLETE CBC AUTOMATED: CPT | Performed by: INTERNAL MEDICINE

## 2020-10-05 PROCEDURE — 93306 TTE W/DOPPLER COMPLETE: CPT | Performed by: INTERNAL MEDICINE

## 2020-10-05 PROCEDURE — 83036 HEMOGLOBIN GLYCOSYLATED A1C: CPT | Performed by: INTERNAL MEDICINE

## 2020-10-05 PROCEDURE — 80048 BASIC METABOLIC PNL TOTAL CA: CPT | Performed by: INTERNAL MEDICINE

## 2020-10-05 PROCEDURE — 99239 HOSP IP/OBS DSCHRG MGMT >30: CPT | Performed by: INTERNAL MEDICINE

## 2020-10-05 RX ADMIN — TICAGRELOR 90 MG: 90 TABLET ORAL at 08:01

## 2020-10-05 RX ADMIN — ENOXAPARIN SODIUM 40 MG: 40 INJECTION SUBCUTANEOUS at 08:01

## 2020-10-05 RX ADMIN — SODIUM CHLORIDE 100 ML/HR: 9 INJECTION, SOLUTION INTRAVENOUS at 00:01

## 2020-10-05 RX ADMIN — SODIUM CHLORIDE, PRESERVATIVE FREE 10 ML: 5 INJECTION INTRAVENOUS at 08:01

## 2020-10-05 RX ADMIN — ASPIRIN 81 MG: 81 TABLET ORAL at 08:01

## 2020-10-05 RX ADMIN — PERFLUTREN 8.48 MG: 6.52 INJECTION, SUSPENSION INTRAVENOUS at 16:19

## 2020-10-05 NOTE — PAYOR COMM NOTE
"10/05/20 [facility] [phone]  [phone]      PATIENT ADMITTED ON 10/03/20 . ER ADMISSION TO INPATIENT.            Lio Velasquez (64 y.o. Male)     Date of Birth Social Security Number Address Home Phone MRN    1955  [address] [facility] [address] [phone] [id number]    Buddhist Marital Status          Muslim        Admission Date Admission Type Admitting Provider Attending Provider Department, Room/Bed    10/3/20 Emergency Storm Encinas MD Bose, Sanjay, MD [facility] 4B, 402/1    Discharge Date Discharge Disposition Discharge Destination                       Attending Provider: Storm Encinas MD    Allergies: Demerol [Meperidine], Morphine And Related    Isolation: None   Infection: None   Code Status: CPR    Ht: 182.9 cm (72\")   Wt: 116 kg (255 lb 9.6 oz)    Admission Cmt: None   Principal Problem: NSTEMI (non-ST elevated myocardial infarction) (CMS/[facility]) [I21.4]                 Active Insurance as of 10/3/2020     Primary Coverage     Payor Plan Insurance Group Employer/Plan Group    Formerly [facility], [facility] BLUE [id number]     Payor Plan Address Payor Plan Phone Number Payor Plan Fax Number Effective Dates    [address]   1/10/2016 - None Entered    [place] [address]       Subscriber Name Subscriber Birth Date Member ID       LIZ VELASQUEZ 4/17/1956 [id number]                 Emergency Contacts      (Rel.) Home Phone Work Phone Mobile Phone    Liz Velasquez (Spouse) -- -- [phone]        Kamran Kern MD   Physician   Emergency Medicine   ED Provider Notes   Signed   Date of Service:  10/03/20 1509   Creation Time:  10/03/20 1509            Signed        Expand All Collapse All      Show:Clear all  [x]Manual[x]Template[]Copied    Added by:  [x]Kamran Kern MD    []Lesley for details     Subjective      This is a 64-year-old gentleman with a past medical history of hypertension, hyperlipidemia, " coronary artery disease who presents the emergency department chief complaint of chest pain.  Patient states he had a heavy day of work yesterday and began to experience chest pain that awakened him from sleep last night.  He complains of an intermittent dull anterior wall chest pain.  Worse with exertion, completely resolves with rest.  Associated with nausea, and fatigue.  He denies any leg pain, leg swelling, numbness, weakness, paresthesias, fevers, chills, or cough.     Past medical history: Hypertension, hyperlipidemia, CAD  Social history: Denies tobacco, alcohol, or illicit drug use              History        Past Medical History:   Diagnosis Date   • Aortocoronary bypass status     • CAD in native artery     • Chest pain     • Coronary angioplasty status     • Hyperlipidemia       unspecified   • Hypertension, benign     • Obesity     • Sleep apnea       unspecified      MDM  Number of Diagnoses or Management Options  NSTEMI (non-ST elevated myocardial infarction) (CMS/[facility]): new and requires workup  Precordial pain: new and requires workup  Diagnosis management comments: Patient presents with chest pain shortness of breath.  Upon arrival in no acute distress vital signs are reassuring.  IV access is obtained and labs are sent.  He is given 324 mg of aspirin.His chest x-ray shows no acute findings.  Lab work overall is reassuring with a CMP with no gross electrolyte abnormalities, no signs of kidney injury, no elevation anion gap, CBC is unremarkable, unfortunately his troponin is elevated at 0.145.  He has been given aspirin.  He is pain-free at this time so we will defer nitroglycerin.  He is not hypertensive at this time.  Low concern for aortic dissection with no thunderclap pain, no tearing or ripping pain, no back pain, no wide mediastinum on chest x-ray, strong bilateral radial pulses.  Low concern for pulmonary embolism with no history of PE, DVT, or malignancy, no recent immobilization, no leg pain  or leg swelling, no hypoxia, no tachypnea, no tachycardia.  Low concern for myocarditis no fever no Tachycardia.  Low concern for pneumonia with no fevers, chills, cough, and no findings of this on physical exam or chest x-ray.  Low concern for cardiac tamponade with no hypotension, no tachycardia, no elevated JVD, no muffled heart sounds, no risk factors for pericardial effusion.  Low concern for esophageal tear with no recent nausea or vomiting, no recent instrumentation, no findings of this on physical exam or chest x-ray.  It appears this is a primary coronary event.  I discussed the case with Dr. Vizcaino and the patient has been admitted in stable condition.Amount and/or Complexity of Data Reviewed  Clinical lab tests: ordered and reviewed  Tests in the radiology section of CPT®: ordered and reviewed  Discuss the patient with other providers: yes (Dr. Encinas)  Independent visualization of images, tracings, or specimens: yes (EKG shows normal sinus rhythm at a rate of 65 with no ST elevation or depression concerning for acute ischemia, T wave inversions in lead III and aVF, narrow QRS, QT within normal limits, AK within normal limits.  When compared with ECG on August 10 T wave inversions appear similar in lead III and F.)     Risk of Complications, Morbidity, and/or Mortality  Presenting problems: high  Diagnostic procedures: low  Management options: high   Patient Progress  Patient progress: improved        Final diagnoses:   NSTEMI (non-ST elevated myocardial infarction) (CMS/[facility])   Precordial pain              Kamran Kern MD  10/03/20 1619  Ana Cristina Salgado APRN   Nurse Practitioner   Cardiology   H&P   Attested   Date of Service:  10/04/20 1015   Creation Time:  10/04/20 1015            Attested        Attestation signed by Storm Encinas MD at 10/04/20 1120       LOS: 1 day   Patient Care Team:  Bridger Rosales MD as PCP - General  Bridger Rosales MD as PCP - Family Medicine  Storm Encinas MD  as Cardiologist (Cardiology)  Alanna Dias MD as Surgeon (Orthopedic Surgery)  Marciano Jaramillo DO as Consulting Physician (Gastroenterology)     Chief Complaint: Precordial chest pain        Subjective     Lio Velasquez is a 64 y.o. malewho is being seen in initial evaluation  Patient presented with severe substernal chest pain  He was sleeping  Associated diaphoresis  Had mild nausea  This is better  EKG showed inferior T wave abnormalities  Cardiac biomarkers elevated  Currently has dull mild to moderate substernal chest pain  Continues to be on IV nitroglycerin  No presyncope  No syncope  No orthopnea  No paroxysmal nocturnal dyspnea  Chest pain is non-positional  Chest pain is nonpleuritic  Chest pain is non-gustatory  Hemodynamically stable  Family member by bedside  Overall feels weak and tired                 Review of Systems      Constitutional: No chills   Has fatigue   No fever.      HENT: Negative.    Eyes: Negative.       Respiratory: Negative for cough,   No chest wall soreness,   Shortness of breath,   no wheezing, no stridor.       Cardiovascular: As above     Gastrointestinal: Negative for abdominal distention,  No abdominal pain,   No blood in stool,   No constipation,   No diarrhea,   No nausea   No vomiting.      7 days   Lab Units 10/03/20  1426   SODIUM mmol/L 139   POTASSIUM mmol/L 3.8   CHLORIDE mmol/L 105   CO2 mmol/L 24.0   BUN mg/dL 13   CREATININE mg/dL 0.82   CALCIUM mg/dL 10.0   BILIRUBIN mg/dL 0.6   ALK PHOS U/L 71   ALT (SGPT) U/L 25   AST (SGOT) U/L 30   GLUCOSE mg/dL 138*            Lab Results   Component Value Date     CKMB 1.61 07/07/2016     TROPONINI <0.012 07/07/2016     TROPONINT 0.198 (C) 10/04/2020      Physical Exam:     General Appearance: Awake, alert, in no acute distress  Eyes: Pupils equal and reactive    Ears: Appear intact with no abnormalities noted  Nose: Nares normal, no drainage  Neck: supple, trachea midline, no carotid bruit and no JVD  Back: no  kyphosis present,    Lungs: respirations regular, respirations even and respirations unlabored     Heart: normal S1, S2,  2/6 pansystolic murmur in the left sternal border,    no rub and no click     Abdomen: normal bowel sounds, no tenderness   Skin: no bleeding, bruising or rash  Extremities: no cyanosis  Psychiatric/Behavioral: Negative for agitation, behavioral problems, confusion, the patient does  appear to be nervous/anxious.      PT/INR:  Assessment/Plan        NSTEMI (non-ST elevated myocardial infarction) (CMS/[facility])    Coronary artery disease involving native coronary artery of native heart with unstable angina pectoris (CMS/[facility])    Mixed hyperlipidemia    Essential hypertension    Obstructive sleep apnea on CPAP  Plan     Recommend cardiac catheterization, selective coronary angiography, left ventriculography and percutaneous coronary intervention with application of arteriotomy hemostatic closure device.     I discussed cardiac catheterization, the procedure, risks (including bleeding, infection, vascular damage [including minor oozing, bruising, bleeding, and up to and including but not limited to the need for vascular surgery, emergency cardiothoracic surgery, contrast reaction, renal failure, respiratory failure, heart attack, stroke, arrhythmia and even death), benefits, and alternatives and the patient has voiced understanding and is willing to proceed.     Adequate pre-hydration and post cardiac catheterization hydration.  Premedications as required and indicated for cardiac catheterization.     No contraindication to drug eluting stent placement if required  Further recommendations pending results of cardiac catheterization      Telemetry  Deep vein thrombosis prophylaxis/precautions  Appropriate diet, fluid, sodium, caffeine, stimulants intake   Questions were encouraged, asked and answered to the patient's  understanding and satisfaction.  Compliance to diet and medications         Storm Encinas,  MD  10/04/20  11:18 CDT     EMR Dragon/Transcription was used to dictate part of this note       Expand All Collapse All       REVIEW OF SYSTEMS:  Constitutional: Denies fever or chills. Denies change in energy level or malaise. Easily fatigue, decreased endurance over the past month.   HEENT: Denies headaches or visual disturbances. Denies difficulty swallowing or sore throat.  Respiratory: Denies cough or hoarseness. + shortness of breath with exertion.  Cardiovascular: Chest pain that woke him from sleep. Denies palpitations. Denies presyncope/syncope. Denies orthopnea/PND. Mild bilateral ankle/pedal edema.   Gastrointestinal: Denies abdominal pain. Denies nausea/vomiting. Denies change in bowel habits or history of recent GI tract blood loss.   Genitourinary: Denies urinary urgency or frequency. Denies dysuria, hematuria.  Musculoskeletal: Denies pain or swelling in joints.  Neurological: Denies paresthesias. Denies headache. Denies seizure or stroke symptoms.  Behavioral/Psych: Denies problems with anxiety or depression.  Objective         Vital Signs  Temp:  [97.5 °F (36.4 °C)-98.2 °F (36.8 °C)] 97.6 °F (36.4 °C)  Heart Rate:  [56-64] 56  Resp:  [16-18] 16  BP: (109-147)/(65-85) 111/68     Physical Exam:                 General Appearance:    Alert, cooperative, in no acute distress   HEENT:    Normocephalic. Atraumatic. LEANDRO.    Neck:   No adenopathy, supple, trachea midline, no thyromegaly, no carotid bruit, no JVD   Lungs:     Clear to auscultation, respirations regular, even and unlabored    Heart:    Regular rhythm and normal rate, normal S1 and S2, no murmur, no gallop, no rub, no click   Abdomen:     Normal bowel sounds, no masses, no organomegaly, soft, non-tender, non-distended, no guarding, no rebound tenderness   Extremities:   Moves all extremities, no edema, no cyanosis, no redness   Pulses:   Pulses palpable and equal bilaterally   Skin:   No bleeding, bruising or rash   Lymph nodes:   No  palpable adenopathy   Neurologic:   Cranial nerves 2 - 12 grossly intact       Results Review:   1.  EKG - SR with Inferior T wave changes - Unchanged from previous EKGs  2.  Troponin - 0.145, 0.187        Assessment/Plan           NSTEMI (non-ST elevated myocardial infarction) (CMS/[facility]) with Known Coronary artery disease involving native coronary artery of native heart with unstable angina pectoris (CMS/[facility]) - Recent PCI/BRIANNA to SVG-RCA on 2020. On ASA, Brilinta, BB, ACE-I, Statin    Essential hypertension - Controlled.     Mixed hyperlipidemia - on Statin. Check Lipid Panel with LDL < 70    Obstructive sleep apnea on CPAP     Repeat troponin this morning. Cardiac catheterization today.      I discussed the patients findings and my recommendations with patient. Please note that this documentation resulted in a face-to-face encounter provided by me.         RODGER Dailey  10/04/20  10:15 CDT                        Cosigned by: Storm Encinas MD at 10/04/20 1120      ED to Hosp-Admission (Current) on 10/3/2020  Summary: no complaints of pain. right heart cath today. cath site is c/d/i. fluids infusing. wife at bedside. anticipates home tomorrow      [facility] CATH LAB  [address] [place] [id number] [phone]             Lio Velasquez  Cardiac Catheterization/Vascular Study  Order# [id number]  Reading physician: Storm Encinas MD Ordering physician: Storm Encinas MD Study date: 10/4/20    Procedures    Cardiac Catheterization/Vascular Study 6F Right groin       Patient Information    Patient Name   Lio Velasquez MRN   [id number] Sex   Male  (Age)   1955 (64 y.o.)   Race Ethnicity Encounter Category   White or  Not  or  Emergency   PACS Images     Show images for Cardiac Catheterization/Vascular Study    Printable Vessel Diagram    Printable Vessel Diagram      Physicians    Panel Physicians Referring Physician Case Authorizing Physician   Ce  MD Storm (Primary) Provider, No Known Storm Encinas MD   Indications    NSTEMI (non-ST elevated myocardial infarction) (CMS/[facility]) [I21.4 (ICD-10-CM)]       Conclusion    · Mid LAD lesion is 100% stenosed.  · Prox RCA lesion is 100% stenosed. Vessel two-vessel collaterals noted from proximal to distal right coronary arteryAlso collaterals noted to obtuse marginal branch  · Origin to Prox Graft lesion is 100% stenosed.  · Mild systolic dysfunction.  · The ejection fraction was 40-50% by visual estimate.  · No mitral valve regurgitation.  · No aortic valve stenosis.  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     •     High intensity statin therapy.     •     Aspirin therapy.  •     Dual antiplatelet therapy.   Procedure Narrative    Risks, benefits and alternatives were discussed with the patient and/or family.  Plan is for moderate sedation and patient is in agreement.  An immediate assessment was done prior to the administration of moderate sedation.  Less than 20 mL of estimated blood loss during the case. No specimen was collected during the case.   Time Under Physician Observation    Name Panel   Storm Encinas MD Panel 1   Role: Primary   Time Period: 10/4/2020 1150 - 10/4/2020 1220    Total Sedation Time    Moderate sedation event time was not documented.      Coronary Findings    Diagnostic  Dominance: Right  Number of lesions: 4    Left Anterior Descending   Previously placed Prox LAD stent (unknown type) is widely patent.   Mid LAD lesion is 100% stenosed.   Right Coronary Artery   Prox RCA lesion is 100% stenosed.   LIMA Graft to Mid LAD   Graft to Dist RCA   Origin to Prox Graft lesion is 100% stenosed.   Intervention    No interventions have been documented.  Left Heart Findings    Left Ventricle The left ventricular size is  normal. There is mild left ventricular systolic dysfunction. The left ventricular ejection fraction is 40-50% by visual estimation. There are wall motion abnormalities noted in the left ventricle.The outflow tract is normal.   Mitral Valve There is no mitral valve regurgitation.   Aortic Valve There is no aortic valve stenosis.   Wall Motion     The mid anterior, basilar anterior, basilar inferior, apical anterior and apical inferior segments are normal. The mid inferior segment is akinetic.                Vessel Access    Micropuncture not used. Sheath insertion not delayed. Hemostasis started on the right femoral artery. Mynx was used in achieving hemostasis. Closure device deployed in the vessel. Hemostasis achieved successfully.    Hemodynamics    Left ventricular systolic pressure: 145 mmHg  Left ventricular end diastolic pressure: 21 mmHg  Left ventricular end diastolic pressure: 21 mmHg   Complications    Complications documented before study signed (10/4/2020 12:49 PM)     No complications were associated with this study.   Documented by Storm Encinas MD - 10/4/2020 12:48 PM       Radiation     Event   12:16 PM Radiation Tracking   Details: Panel 1: Storm Encinas MD   Cumulative Air Kerma (mGy) = 341.000; Fluoro Time (min) = 5.4; DAP (mGy-cm2) = 315.620   User: LS      Total Contrast    Medication Name Total Dose   iopamidol (ISOVUE-370) 76 % injection 152 mL      Patient Hx Of Height, Weight, and Vitals    Height Weight BSA (Calculated - sq m) BMI (kg/m2) Pulse BP       56 111/68   Admission Information    Admission Date/Time Discharge Date/Time Room/Bed   10/03/20  03:03 PM  402/1   Medications  (Filter: Administrations occurring from 10/04/20 0000 to 10/04/20 1249)  (important)  Continuous medications are totaled by the amount administered until 10/04/20 1249.   Medication Rate/Dose/Volume Action  Date Time    heparin infusion 2 units/mL in 0.9% NaCl (mL) 500 mL Given 10/04/20 1131    Total dose as of  10/04/20 1249 1,000 mL Given 1132    1,500 mL         diphenhydrAMINE (BENADRYL) injection (mg) 50 mg Given 10/04/20 1148    Total dose as of 10/04/20 1249         50 mg         fentaNYL citrate (PF) (SUBLIMAZE) injection (mcg) 50 mcg Given 10/04/20 1148    Total dose as of 10/04/20 1249         50 mcg         Midazolam HCl (PF) (VERSED) injection (mg) 2 mg Given 10/04/20 1148    Total dose as of 10/04/20 1249         2 mg         lidocaine (cardiac) (XYLOCAINE) injection (mL) 10 mL Given 10/04/20 1150    Total dose as of 10/04/20 1249         10 mL         iopamidol (ISOVUE-370) 76 % injection (mL) 152 mL Given 10/04/20 1214    Total dose as of 10/04/20 1249         152 mL         aspirin EC tablet 81 mg (mg) 81 mg Given 10/04/20 0817    Dosing weight:  117 kg *Not included in total MAR Hold 1124    Total dose as of 10/04/20 1249         81 mg         *Administration not included in total         influenza vac split quad (FLUZONE,FLUARIX,AFLURIA,FLULAVAL) injection 0.5 mL (mL) *Not included in total MAR Hold 10/04/20 1124    Dosing weight:  117 kg         Total dose as of 10/04/20 1249         Cannot be calculated         nitroglycerin (TRIDIL) 200 mcg/ml infusion (mcg/min)  Stopped 10/04/20 0050    Dosing weight:  117 kg         Total dose as of 10/04/20 1249         Cannot be calculated         rosuvastatin (CRESTOR) tablet 10 mg (mg) *Not included in total MAR Hold 10/04/20 1124    Dosing weight:  117 kg         Total dose as of 10/04/20 1249         Cannot be calculated         sodium chloride 0.9 % flush 10 mL (mL) *Not included in total MAR Hold 10/04/20 1124    Total dose as of 10/04/20 1249         Cannot be calculated         sodium chloride 0.9 % flush 10 mL (mL) 10 mL Given 10/04/20 0819    Dosing weight:  117 kg *Not included in total MAR Hold 1124    Total dose as of 10/04/20 1249         10 mL         *Administration not included in total         sodium chloride 0.9 % flush 10 mL (mL) *Not included in  total MAR Hold 10/04/20 1124    Dosing weight:  117 kg         Total dose as of 10/04/20 1249         Cannot be calculated         sodium chloride 0.9 % infusion (mL/hr) *50 mL/hr Currently Infusing 10/04/20 0431    Dosing weight:  117 kg  Stopped 1245    Total dose as of 10/04/20 1249 50 mL/hr New Bag 1246    2.5 mL         *Administration not included in total         ticagrelor (BRILINTA) tablet 90 mg (mg) 90 mg Given 10/04/20 0817    Dosing weight:  117 kg *Not included in total MAR Hold 1124    Total dose as of 10/04/20 1249         90 mg         *Administration not included in total         traZODone (DESYREL) tablet 50 mg (mg) *Not included in total MAR Hold 10/04/20 1124    Dosing weight:  117 kg         Total dose as of 10/04/20 1249         Cannot be calculated         enoxaparin (LOVENOX) syringe 120 mg (mg) 120 mg Given 10/04/20 0817    Dosing weight:  117 kg *Not included in total MAR Hold 1124    Total dose as of 10/04/20 1249         120 mg         *Administration not included in total         Supplies    Name ID Temporary Type Charge Code Description Charge Code Quantity   KT CONTRST INJ ISOL/MANFLD W/TRANSDCR 641 No Supply HC OR SUPPLY SHELL 270/NO CPT 391771 1   KT CONTRST INJ ISOL/RESVR 100ML REUS 642 No Supply   1   SOL IRR NACL 0.9PCT BT 1000ML 650 No Supply   1   KT CONTRL HND ACIST CVI WILFRIDO HIPRESS 65 4375 No Supply   1   SOLIDIFIER LIQUI LOC PLUS 2000CC 4423 No Supply   1   JOSE CO2/O2 NASL A/ 5117 No Supply HC OR SUPPLY SHELL 270/NO CPT 941227 1   CATH DIAG DXTERITY BOBO CRV 5F 100CM 0/SH 05350 No Diagnostic Catheter HC OR SUPPLY SHELL 272/NO CPT 073336 1   DEV CLS VASC MYNX  6 TO 7F 83150 No Hemostasis Device HC OR SUPPLY SHELL 278/ 661297 1   SHEATH INTRO PINN CORNRY .038 6F10CM 50532 No Supply HC OR SUPPLY SHELL 272/ 163270 1   CATH DIAG INFINITI JL5 5F 100CM 89563 No Diagnostic Catheter HC OR SUPPLY SHELL 272/NO CPT 144124 1   CATH DIAG INFINITI MPA2 5F 100CM 01163 No  Diagnostic Catheter HC OR SUPPLY SHELL 272/NO CPT 877911 1   CATH DIAG INFINITI M/PK VHN922 JL4/JR4 5SH 5F 58982 No Diagnostic Catheter HC OR SUPPLY SHELL 272/NO CPT 541395 1   ELECTRD PAD DEFIB A/ 80055 No Supply HC OR SUPPLY SHELL 272/NO CPT 755479 1   GW STARTER FXD CORE J .035 1A890OW 3MM 91684 No Guidewire HC OR SUPPLY SHELL 272/ 787288 1   PK CATH CARD 30 CA/4 003878 No Supply   1   Signed    Electronically signed by Storm Encinas MD on 10/4/20 at 1249 CDT     XR Chest 1 View [[id number]] (Order [id number])  Order  Status: Final result   Patient Location    Patient Class Location   Inpatient [facility] 4B, 402, 1    [phone]   Appointment Information    PACS Images     Radiology Images   Study Result    Frontal upright radiograph of the chest 10/3/2020 3:20 PM CDT     COMPARISON: July 6, 2016.     FINDINGS:   The lungs are clear. The cardiac silhouette is normal. Wires are present  from previous median sternotomy.      The osseous structures and surrounding soft tissues demonstrate no acute  abnormality.     IMPRESSION:  1. No radiographic evidence of acute cardiopulmonary process.        This report was finalized on 10/03/2020 15:29 by Dr. Brad Pina MD.   Imaging    XR Chest 1 View (Order: [id number]) - 10/3/2020  Result History    XR Chest 1 View (Order #[id number]) on 10/3/2020 - Order Result History Report   Reprint Order Requisition       Contains critical data Troponin  Order: [id number]  Status:  Final result   Visible to patient:  No (not released) Next appt:  02/08/2021 at 08:30 AM in Cardiology (RODGER Acosta)  Specimen Information: Blood        Component   Ref Range & Units 1d ago  (10/4/20) 2d ago  (10/3/20) 2d ago  (10/3/20)   Troponin T   0.000 - 0.030 ng/mL 0.198High Critical   0.187High Critical   0.145High Critical     Resulting Agency  [facility]  [facility]  [facility]      Narrative  Performed by:  [facility]  Troponin T Reference Range:   <= 0.03 ng/mL-   Negative for AMI   >0.03  ng/mL-     Abnormal for myocardial necrosis.  Clinicians would have to utilize clinical acumen, EKG, Troponin and serial changes to determine if it is an Acute Myocardial Infarction or myocardial injury due to an underlying chronic condition.       Results may be falsely decreased if patient taking Biotin.       Specimen Collected: 10/04/20 10:22 Last Resulted: 10/04/20 11:18 Lab Flowsheet Order Details View Encounter Lab and Collection Details Routing Result History         Related Result Highlights           Lipid Panel  Final result 10/4/2020                             Current Facility-Administered Medications   Medication Dose Route Frequency Provider Last Rate Last Dose   • acetaminophen (TYLENOL) tablet 650 mg  650 mg Oral Q4H PRN Storm Encinas MD       • aspirin EC tablet 81 mg  81 mg Oral Daily Storm Encinas MD   81 mg at 10/05/20 0801   • enoxaparin (LOVENOX) syringe 40 mg  40 mg Subcutaneous Q24H Storm Encinas MD   40 mg at 10/05/20 0801   • influenza vac split quad (FLUZONE,FLUARIX,AFLURIA,FLULAVAL) injection 0.5 mL  0.5 mL Intramuscular During Hospitalization Storm Encinas MD       • metoprolol succinate XL (TOPROL-XL) 24 hr tablet 50 mg  50 mg Oral Nightly Strom Encinas MD   50 mg at 10/04/20 2112   • nitroglycerin (TRIDIL) 200 mcg/ml infusion  10-50 mcg/min Intravenous Titrated Storm Encinas MD   Stopped at 10/04/20 0050   • ramipril (ALTACE) capsule 2.5 mg  2.5 mg Oral Nightly Storm Encinas MD   2.5 mg at 10/04/20 2112   • rosuvastatin (CRESTOR) tablet 10 mg  10 mg Oral Nightly Storm Encinas MD   10 mg at 10/04/20 2112   • sodium chloride 0.9 % flush 10 mL  10 mL Intravenous PRN Storm Encinas MD       • sodium chloride 0.9 % flush 10 mL  10 mL Intravenous Q12H Storm Encinas MD   10 mL at 10/05/20 0801   • sodium chloride 0.9 % flush 10 mL  10 mL Intravenous PRN Storm Encinas MD       • sodium chloride 0.9 % infusion  50 mL/hr Intravenous Continuous Storm Encinas MD 50 mL/hr at 10/04/20 1246 50 mL/hr  at 10/04/20 1246   • sodium chloride 0.9 % infusion  100 mL/hr Intravenous Continuous Storm Encinas  mL/hr at 10/05/20 0001 100 mL/hr at 10/05/20 0001   • ticagrelor (BRILINTA) tablet 90 mg  90 mg Oral Q12H Storm Encinas MD   90 mg at 10/05/20 0801   • traZODone (DESYREL) tablet 50 mg  50 mg Oral Nightly PRN Storm Encinas MD

## 2020-10-05 NOTE — DISCHARGE SUMMARY
LOS: 2 days   Patient Care Team:  Bridger Rosales MD as PCP - General  Bridger Rosales MD as PCP - Family Medicine  Storm Encinas MD as Cardiologist (Cardiology)  Alanna Dias MD as Surgeon (Orthopedic Surgery)  Marciano Jaramillo DO as Consulting Physician (Gastroenterology)    Chief Complaint: Chest pain with elevated cardiac biomarkers  Shortness of breath    Subjective  Feeling  better  No chest pain   No excessive shortness of breath  No new complaints    Interval History: Improved overall    Denies chest pain currently. Denies excessive shortness of breath. Denies abdominal pain, nausea vomiting or diarrhoea.    Telemetry: no malignant arrhythmia. No significant pauses.    Review of Systems   Constitutional: No chills   No fatigue   No fever.   HENT: Negative.    Eyes: Negative.    Respiratory: Negative for cough,   No chest wall soreness,   Mild shortness of breath,   no wheezing, no stridor.    Cardiovascular: Negative for chest pain,   No palpitations   No significant  leg swelling.   Gastrointestinal: Negative for abdominal distention,  No abdominal pain,   No blood in stool, constipation, diarrhea, nausea or vomiting.   Endocrine: Negative.    Genitourinary: Negative for difficulty urinating, dysuria, flank pain and hematuria.   Musculoskeletal: Negative.    Skin: Negative for rash and wound.   Allergic/Immunologic: Negative.    Neurological: Negative for dizziness, syncope, weakness,   No light-headedness or headaches.   Hematological: Does not bruise/bleed easily.   Psychiatric/Behavioral: Negative for agitation, behavioral problems, confusion, the patient does not appear to be nervous/anxious.       History:   Past Medical History:   Diagnosis Date   • Aortocoronary bypass status    • CAD in native artery    • Chest pain    • Coronary angioplasty status    • Hyperlipidemia     unspecified   • Hypertension, benign    • Obesity    • Sleep apnea     unspecified     Past Surgical History:    Procedure Laterality Date   • ANKLE SURGERY      with screws    • CARDIAC CATHETERIZATION Left 03/05/2012 7/2016   • CARDIAC CATHETERIZATION Left 8/12/2019    Procedure: Cardiac Catheterization/Vascular Study BRIANNA OK;  Surgeon: Storm Encinas MD;  Location:  [facility];  Service: Cardiology   • CARDIAC CATHETERIZATION Left 10/4/2020    Procedure: Cardiac Catheterization/Vascular Study 6F Right groin ;  Surgeon: Storm Encinas MD;  Location:  [facility];  Service: Cardiology;  Laterality: Left;   • COLONOSCOPY  12/30/2008   • COLONOSCOPY  12/30/2013    two polyps removed from the sigmoid colon   • COLONOSCOPY N/A 6/21/2019    Procedure: COLONOSCOPY WITH ANESTHESIA;  Surgeon: Marciano Jaramillo DO;  Location:  [facility];  Service: Gastroenterology   • CORONARY ARTERY BYPASS GRAFT  1993    x2   • CORONARY STENT PLACEMENT  2019    X 3   • JOINT REPLACEMENT Left     left ankle   • SHOULDER SURGERY Right     with chano   • WRIST SURGERY Left      Social History     Socioeconomic History   • Marital status:      Spouse name: Not on file   • Number of children: Not on file   • Years of education: Not on file   • Highest education level: Not on file   Tobacco Use   • Smoking status: Never Smoker   • Smokeless tobacco: Never Used   Substance and Sexual Activity   • Alcohol use: Yes     Comment: occasional   • Drug use: No   • Sexual activity: Defer     Family History   Problem Relation Age of Onset   • Diabetes Mother         type II   • Heart failure Mother         congestive heart failure   • Colon polyps Neg Hx    • Esophageal cancer Neg Hx    • Rectal cancer Neg Hx    • Colon cancer Neg Hx        Labs:  WBC WBC   Date Value Ref Range Status   10/05/2020 7.10 3.40 - 10.80 10*3/mm3 Final   10/03/2020 6.53 3.40 - 10.80 10*3/mm3 Final      HGB Hemoglobin   Date Value Ref Range Status   10/05/2020 13.4 13.0 - 17.7 g/dL Final   10/03/2020 13.9 13.0 - 17.7 g/dL Final      HCT  Hematocrit   Date Value Ref Range Status   10/05/2020 38.6 37.5 - 51.0 % Final   10/03/2020 39.8 37.5 - 51.0 % Final      Platlets Platelets   Date Value Ref Range Status   10/05/2020 142 140 - 450 10*3/mm3 Final   10/03/2020 162 140 - 450 10*3/mm3 Final      MCV MCV   Date Value Ref Range Status   10/05/2020 91.7 79.0 - 97.0 fL Final   10/03/2020 90.9 79.0 - 97.0 fL Final        Results from last 7 days   Lab Units 10/05/20  0253 10/03/20  1426   SODIUM mmol/L 139 139   POTASSIUM mmol/L 3.8 3.8   CHLORIDE mmol/L 106 105   CO2 mmol/L 24.0 24.0   BUN mg/dL 11 13   CREATININE mg/dL 0.77 0.82   CALCIUM mg/dL 8.9 10.0   BILIRUBIN mg/dL  --  0.6   ALK PHOS U/L  --  71   ALT (SGPT) U/L  --  25   AST (SGOT) U/L  --  30   GLUCOSE mg/dL 133* 138*     Lab Results   Component Value Date    CKMB 1.61 07/07/2016    TROPONINI <0.012 07/07/2016    TROPONINT 0.198 (C) 10/04/2020     PT/INR:    Protime   Date Value Ref Range Status   10/03/2020 13.4 11.9 - 14.6 Seconds Final   /  INR   Date Value Ref Range Status   10/03/2020 1.06 0.91 - 1.09 Final       Imaging Results (Last 72 Hours)     Procedure Component Value Units Date/Time    XR Chest 1 View [[id number]] Collected: 10/03/20 1528     Updated: 10/03/20 1532    Narrative:      Frontal upright radiograph of the chest 10/3/2020 3:20 PM CDT     COMPARISON: July 6, 2016.     FINDINGS:   The lungs are clear. The cardiac silhouette is normal. Wires are present  from previous median sternotomy.      The osseous structures and surrounding soft tissues demonstrate no acute  abnormality.       Impression:      1. No radiographic evidence of acute cardiopulmonary process.        This report was finalized on 10/03/2020 15:29 by Dr. Brad Pina MD.          Objective     Allergies   Allergen Reactions   • Demerol [Meperidine]    • Morphine And Related Itching       Medication Review: Performed  Current Facility-Administered Medications   Medication Dose Route Frequency Provider Last Rate  Last Dose   • acetaminophen (TYLENOL) tablet 650 mg  650 mg Oral Q4H PRN Storm Encinas MD       • aspirin EC tablet 81 mg  81 mg Oral Daily Storm Encinas MD   81 mg at 10/05/20 0801   • enoxaparin (LOVENOX) syringe 40 mg  40 mg Subcutaneous Q24H Storm Encinas MD   40 mg at 10/05/20 0801   • influenza vac split quad (FLUZONE,FLUARIX,AFLURIA,FLULAVAL) injection 0.5 mL  0.5 mL Intramuscular During Hospitalization Storm Encinas MD       • metoprolol succinate XL (TOPROL-XL) 24 hr tablet 50 mg  50 mg Oral Nightly Storm Encinas MD   50 mg at 10/04/20 2112   • nitroglycerin (TRIDIL) 200 mcg/ml infusion  10-50 mcg/min Intravenous Titrated Storm Encinas MD   Stopped at 10/04/20 0050   • ramipril (ALTACE) capsule 2.5 mg  2.5 mg Oral Nightly Storm Encinas MD   2.5 mg at 10/04/20 2112   • rosuvastatin (CRESTOR) tablet 10 mg  10 mg Oral Nightly Storm Encinas MD   10 mg at 10/04/20 2112   • sodium chloride 0.9 % flush 10 mL  10 mL Intravenous PRN Storm Encinas MD       • sodium chloride 0.9 % flush 10 mL  10 mL Intravenous Q12H Storm Encinas MD   10 mL at 10/05/20 0801   • sodium chloride 0.9 % flush 10 mL  10 mL Intravenous PRN Storm Encinas MD       • sodium chloride 0.9 % infusion  50 mL/hr Intravenous Continuous Storm Encinas MD 50 mL/hr at 10/04/20 1246 50 mL/hr at 10/04/20 1246   • sodium chloride 0.9 % infusion  100 mL/hr Intravenous Continuous Storm Encinas  mL/hr at 10/05/20 0001 100 mL/hr at 10/05/20 0001   • ticagrelor (BRILINTA) tablet 90 mg  90 mg Oral Q12H Storm Encinas MD   90 mg at 10/05/20 0801   • traZODone (DESYREL) tablet 50 mg  50 mg Oral Nightly PRN Storm Encinas MD           Vital Sign Min/Max for last 24 hours  Temp  Min: 97.6 °F (36.4 °C)  Max: 98.6 °F (37 °C)   BP  Min: 114/68  Max: 126/69   Pulse  Min: 58  Max: 70   Resp  Min: 16  Max: 20   SpO2  Min: 94 %  Max: 98 %   Flow (L/min)  Min: 2  Max: 2   Weight  Min: 116 kg (255 lb 9.6 oz)  Max: 116 kg (255 lb 11.7 oz)     Flowsheet Rows      First  "Filed Value   Admission Height  182.9 cm (72\") Documented at 10/03/2020 1421   Admission Weight  117 kg (258 lb) Documented at 10/03/2020 1421          Results for orders placed during the hospital encounter of 09/01/20   Adult Transthoracic Echo Complete W/ Cont if Necessary Per Protocol    Narrative · Estimated EF = 54%.  · Left ventricular systolic function is normal.  · Left atrial cavity size is mildly dilated.  · No evidence of pulmonary hypertension is present.  · Left ventricular diastolic function is normal. Normal left atrial   pressure.  · Abnormal global Longitudinal LV strain = -9.3%.            Consults:   Consults     No orders found from 9/4/2020 to 10/4/2020.          Procedures Performed  Procedure(s):  Cardiac Catheterization/Vascular Study 6F Right groin        Physical Exam:  General Appearance: Awake, alert, in no acute distress  Eyes: Pupils equal and reactive    Ears: Appear intact with no abnormalities noted  Nose: Nares normal, no drainage  Neck: supple, trachea midline, no carotid bruit and no JVD  Back: no kyphosis present,    Lungs: respirations regular, respirations even and respirations unlabored  Heart: normal S1, S2, no murmurs gallops or rubs  Abdomen: normal bowel sounds, no masses, no hepatomegaly, no splenomegaly, guarding and no rebound tenderness   Skin: no bleeding, bruising or rash  Extremities: no cyanosis  Psychiatric/Behavioral: Negative for agitation, behavioral problems, confusion, the patient does not appear to be nervous/anxious.     Right groin: Arteriotomy site healthy,  No bleeding, swelling or excessive bruising. Preserved distal pulses.      Results Review:   I reviewed the patient's new clinical results.  I reviewed the patient's new imaging results and agree with the interpretation.  I reviewed the patient's other test results and agree with the interpretation  I personally viewed and interpreted the patient's EKG/Telemetry data  Discussed with patient as well " as his wife both yesterday and today.      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 male presented with chest pain as well as elevated cardiac biomarkers  Underwent cardiac catheterization with findings and results as below  Conclusion    · Mid LAD lesion is 100% stenosed.  · Prox RCA lesion is 100% stenosed. Vessel two-vessel collaterals noted from proximal to distal right coronary arteryAlso collaterals noted to obtuse marginal branch  · Origin to Prox Graft lesion is 100% stenosed.  · Mild systolic dysfunction.  · The ejection fraction was 40-50% by visual estimate.  · No mitral valve regurgitation.  · No aortic valve stenosis.  · Left ventricular end diastolic pressure: 21 mmHg  · As complete occlusion of saphenous venous graft to right coronary artery there is no indication for intervention        In all probability patient had a myocardial infarction approximately 8 to 10 days ago when he has severe substernal chest pain while at work.  Currently his symptoms are reflective of postinfarction angina  Given complete occlusion of saphenous venous graft there is no indication for revascularization of occluded grafts  We will check echocardiogram prior to discharge      Condition on Discharge: Stable and Improved  ______________________________________________________________________________________________________________________________________________________________________________________________________________________________       Plan on  discharge  ______________________________________________________________________________________________________________________________________________________________________________________________________________________________       OK to discharge  Low salt cardiac diet.   Discharge to home and or self care with improvement or resolution of presenting symptoms or complaints  Ambulating    No heavy lifting for 5-7 days greater than 10 pounds.  No heavy or strenuous pushing or pulling.    No tub baths, hot tubs, swimming pools, or submerging groin underwater for 1 week.  Wash groin site daily with antibacterial soap and water. Rinse and keep clean and dry.    No lotions, powders, or topical ointments to site. Keep open to air and dry.  Call our office with any concerns or if you notice redness, swelling, drainage, warmth, or pain at the site.     Optimal medical therapy  Deep vein thrombosis precautions with hydration and increasing mobility  Counseled regarding disease appropriate diet, fluid, sodium, caffeine, stimulants intake   Stressed compliance to diet and medications   Avoid NSAIDS    Follow up with primary provider and primary cardiologist as scheduled   Overall improved and deemed fit for discharge  Will be provided with written discharge instructions including diet and medications including prescriptions as required and labs as applicable    Questions were encouraged, asked and answered to the patient's  understanding and satisfaction.    Go to nearest emergency room if recurrence of primary complaints or any suspected disabling or life threatening symptoms or complaints such as chest pain, increasing shortness of breath, profound dizziness, weakness, significant palpitations, passing out    episodes, cold sweats, excessive nausea etc  35  minutes spent in the discharge process.    _______________________________________________________________________________________________________________________________________________________________________________________________________________________________________    Discharge Disposition: To home and self care      Discharge Medications     Discharge Medications      ASK your doctor about these medications      Instructions Start Date   aspirin 81 MG tablet   81 mg, Oral, Nightly      CENTRUM CARDIO PO   1 tablet, Oral, Nightly      Foltx 2.5-25-2 MG tablet tablet  Generic drug: folic acid-pyridoxine-cyanocobalamin   1 tablet, Oral, Nightly      metoprolol succinate XL 50 MG 24 hr tablet  Commonly known as: TOPROL-XL   50 mg, Oral, Nightly      nitroglycerin 0.4 MG SL tablet  Commonly known as: NITROSTAT   1 under the tongue as needed for angina, may repeat q5mins for up three doses      ramipril 2.5 MG capsule  Commonly known as: ALTACE   2.5 mg, Oral, Nightly      rosuvastatin 10 MG tablet  Commonly known as: CRESTOR   10 mg, Oral, Nightly      ticagrelor 90 MG tablet tablet  Commonly known as: BRILINTA   90 mg, Oral, Every 12 Hours Scheduled      TRINTELLIX 20 MG tablet  Generic drug: Vortioxetine HBr   20 mg, Oral, Daily             Discharge Diet:  Low salt heart healthy cardiac diet    Activity at Discharge:  As tolerated    Follow-up Appointments  Future Appointments   Date Time Provider Department Center   2/8/2021  8:30 AM Fatmata Beyer APRN [facility]         Test Results Pending at Discharge: None       Storm Encinas MD  10/05/20  16:10 CDT

## 2020-10-05 NOTE — PLAN OF CARE
Goal Outcome Evaluation:  Plan of Care Reviewed With: patient  Progress: improving  Outcome Summary: no c/o pain this shift; right groin cath site clean, dry, and intact; echo to be done today; pt hoping to go home today; rested well; VSS; safety maintained; will continue to monitor

## 2020-10-06 ENCOUNTER — READMISSION MANAGEMENT (OUTPATIENT)
Dept: CALL CENTER | Facility: HOSPITAL | Age: 65
End: 2020-10-06

## 2020-10-06 LAB
BH CV ECHO MEAS - AO MAX PG (FULL): 3.2 MMHG
BH CV ECHO MEAS - AO MAX PG: 6.9 MMHG
BH CV ECHO MEAS - AO MEAN PG (FULL): 2 MMHG
BH CV ECHO MEAS - AO MEAN PG: 4 MMHG
BH CV ECHO MEAS - AO ROOT AREA (BSA CORRECTED): 1.4
BH CV ECHO MEAS - AO ROOT AREA: 8 CM^2
BH CV ECHO MEAS - AO ROOT DIAM: 3.2 CM
BH CV ECHO MEAS - AO V2 MAX: 131 CM/SEC
BH CV ECHO MEAS - AO V2 MEAN: 93.9 CM/SEC
BH CV ECHO MEAS - AO V2 VTI: 29 CM
BH CV ECHO MEAS - AVA(I,A): 3.3 CM^2
BH CV ECHO MEAS - AVA(I,D): 3.3 CM^2
BH CV ECHO MEAS - AVA(V,A): 3.3 CM^2
BH CV ECHO MEAS - AVA(V,D): 3.3 CM^2
BH CV ECHO MEAS - BSA(HAYCOCK): 2.5 M^2
BH CV ECHO MEAS - BSA: 2.4 M^2
BH CV ECHO MEAS - BZI_BMI: 34.6 KILOGRAMS/M^2
BH CV ECHO MEAS - BZI_METRIC_HEIGHT: 182.9 CM
BH CV ECHO MEAS - BZI_METRIC_WEIGHT: 115.7 KG
BH CV ECHO MEAS - EDV(CUBED): 166.4 ML
BH CV ECHO MEAS - EDV(MOD-SP4): 183 ML
BH CV ECHO MEAS - EDV(TEICH): 147.4 ML
BH CV ECHO MEAS - EF(CUBED): 70.8 %
BH CV ECHO MEAS - EF(MOD-SP4): 66.4 %
BH CV ECHO MEAS - EF(TEICH): 61.8 %
BH CV ECHO MEAS - ESV(CUBED): 48.6 ML
BH CV ECHO MEAS - ESV(MOD-SP4): 61.5 ML
BH CV ECHO MEAS - ESV(TEICH): 56.3 ML
BH CV ECHO MEAS - FS: 33.6 %
BH CV ECHO MEAS - IVS/LVPW: 0.83
BH CV ECHO MEAS - IVSD: 1.1 CM
BH CV ECHO MEAS - LA DIMENSION: 4.6 CM
BH CV ECHO MEAS - LA/AO: 1.4
BH CV ECHO MEAS - LAT PEAK E' VEL: 11.7 CM/SEC
BH CV ECHO MEAS - LV DIASTOLIC VOL/BSA (35-75): 77.5 ML/M^2
BH CV ECHO MEAS - LV MASS(C)D: 258.5 GRAMS
BH CV ECHO MEAS - LV MASS(C)DI: 109.4 GRAMS/M^2
BH CV ECHO MEAS - LV MAX PG: 3.6 MMHG
BH CV ECHO MEAS - LV MEAN PG: 2 MMHG
BH CV ECHO MEAS - LV SYSTOLIC VOL/BSA (12-30): 26 ML/M^2
BH CV ECHO MEAS - LV V1 MAX: 95.3 CM/SEC
BH CV ECHO MEAS - LV V1 MEAN: 64.1 CM/SEC
BH CV ECHO MEAS - LV V1 VTI: 21.4 CM
BH CV ECHO MEAS - LVIDD: 5.5 CM
BH CV ECHO MEAS - LVIDS: 3.7 CM
BH CV ECHO MEAS - LVLD AP4: 9.4 CM
BH CV ECHO MEAS - LVLS AP4: 7.2 CM
BH CV ECHO MEAS - LVOT AREA (M): 4.5 CM^2
BH CV ECHO MEAS - LVOT AREA: 4.5 CM^2
BH CV ECHO MEAS - LVOT DIAM: 2.4 CM
BH CV ECHO MEAS - LVPWD: 1.3 CM
BH CV ECHO MEAS - MED PEAK E' VEL: 8.59 CM/SEC
BH CV ECHO MEAS - MV A MAX VEL: 51.4 CM/SEC
BH CV ECHO MEAS - MV DEC TIME: 0.16 SEC
BH CV ECHO MEAS - MV E MAX VEL: 68.9 CM/SEC
BH CV ECHO MEAS - MV E/A: 1.3
BH CV ECHO MEAS - RAP SYSTOLE: 5 MMHG
BH CV ECHO MEAS - RVSP: 10.5 MMHG
BH CV ECHO MEAS - SI(AO): 98.7 ML/M^2
BH CV ECHO MEAS - SI(CUBED): 49.8 ML/M^2
BH CV ECHO MEAS - SI(LVOT): 41 ML/M^2
BH CV ECHO MEAS - SI(MOD-SP4): 51.4 ML/M^2
BH CV ECHO MEAS - SI(TEICH): 38.6 ML/M^2
BH CV ECHO MEAS - SV(AO): 233.2 ML
BH CV ECHO MEAS - SV(CUBED): 117.7 ML
BH CV ECHO MEAS - SV(LVOT): 96.8 ML
BH CV ECHO MEAS - SV(MOD-SP4): 121.5 ML
BH CV ECHO MEAS - SV(TEICH): 91.2 ML
BH CV ECHO MEAS - TR MAX VEL: 117 CM/SEC
BH CV ECHO MEASUREMENTS AVERAGE E/E' RATIO: 6.79
LEFT ATRIUM VOLUME INDEX: 25.7 ML/M2
LEFT ATRIUM VOLUME: 60.7 CM3
MAXIMAL PREDICTED HEART RATE: 157 BPM
STRESS TARGET HR: 133 BPM

## 2020-10-06 NOTE — PAYOR COMM NOTE
"[facility] HOME 10-5-20  [id number]   [phone]    Lio Velasquez (64 y.o. Male)     Date of Birth Social Security Number Address Home Phone MRN    1955  [address] [facility] [address] [phone] [id number]    Mormonism Marital Status          Confucianism        Admission Date Admission Type Admitting Provider Attending Provider Department, Room/Bed    10/3/20 Emergency Storm Encinas MD  [facility] 4B, 402/1    Discharge Date Discharge Disposition Discharge Destination        10/5/2020 Home or Self Care              Attending Provider: (none)   Allergies: Demerol [Meperidine], Morphine And Related    Isolation: None   Infection: None   Code Status: Prior    Ht: 182.9 cm (72.01\")   Wt: 116 kg (255 lb 11.7 oz)    Admission Cmt: None   Principal Problem: NSTEMI (non-ST elevated myocardial infarction) (CMS/HCC) [I21.4]                 Active Insurance as of 10/3/2020     Primary Coverage     Payor Plan Insurance Group Employer/Plan Group    [facility] BLUE CROSS [id number]     Payor Plan Address Payor Plan Phone Number Payor Plan Fax Number Effective Dates    [address]   1/10/2016 - None Entered    [place] [address]       Subscriber Name Subscriber Birth Date Member ID       LIZ VELASQUEZ 4/17/1956 [id number]                 Emergency Contacts      (Rel.) Home Phone Work Phone Mobile Phone    VelasquezLiz guallpa (Spouse) -- -- [phone]               Discharge Summary      Storm Encinas MD at 10/05/20 1610               LOS: 2 days   Patient Care Team:  Bridger Rosales MD as PCP - General  Bridger Rosales MD as PCP - Family Medicine  Storm Encinas MD as Cardiologist (Cardiology)  Alanna Dais MD as Surgeon (Orthopedic Surgery)  Marciano Jaramillo DO as Consulting Physician (Gastroenterology)    Chief Complaint: Chest pain with elevated cardiac biomarkers  Shortness of breath    Subjective  Feeling  better  No chest pain   No excessive shortness of breath  No " new complaints    Interval History: Improved overall    Denies chest pain currently. Denies excessive shortness of breath. Denies abdominal pain, nausea vomiting or diarrhoea.    Telemetry: no malignant arrhythmia. No significant pauses.    Review of Systems   Constitutional: No chills   No fatigue   No fever.   HENT: Negative.    Eyes: Negative.    Respiratory: Negative for cough,   No chest wall soreness,   Mild shortness of breath,   no wheezing, no stridor.    Cardiovascular: Negative for chest pain,   No palpitations   No significant  leg swelling.   Gastrointestinal: Negative for abdominal distention,  No abdominal pain,   No blood in stool, constipation, diarrhea, nausea or vomiting.   Endocrine: Negative.    Genitourinary: Negative for difficulty urinating, dysuria, flank pain and hematuria.   Musculoskeletal: Negative.    Skin: Negative for rash and wound.   Allergic/Immunologic: Negative.    Neurological: Negative for dizziness, syncope, weakness,   No light-headedness or headaches.   Hematological: Does not bruise/bleed easily.   Psychiatric/Behavioral: Negative for agitation, behavioral problems, confusion, the patient does not appear to be nervous/anxious.       History:   Past Medical History:   Diagnosis Date   • Aortocoronary bypass status    • CAD in native artery    • Chest pain    • Coronary angioplasty status    • Hyperlipidemia     unspecified   • Hypertension, benign    • Obesity    • Sleep apnea     unspecified     Past Surgical History:   Procedure Laterality Date   • ANKLE SURGERY      with screws    • CARDIAC CATHETERIZATION Left 03/05/2012 7/2016   • CARDIAC CATHETERIZATION Left 8/12/2019    Procedure: Cardiac Catheterization/Vascular Study BRIANNA OK;  Surgeon: Storm Encinas MD;  Location:  [facility] CATH INVASIVE LOCATION;  Service: Cardiology   • CARDIAC CATHETERIZATION Left 10/4/2020    Procedure: Cardiac Catheterization/Vascular Study 6F Right groin ;  Surgeon: Storm Encinas MD;  Location:  [facility] CATH INVASIVE LOCATION;  Service: Cardiology;  Laterality: Left;   • COLONOSCOPY  12/30/2008   • COLONOSCOPY  12/30/2013    two polyps removed from the sigmoid colon   • COLONOSCOPY N/A 6/21/2019    Procedure: COLONOSCOPY WITH ANESTHESIA;  Surgeon: Marciano Jaramillo DO;  Location: [facility] ENDOSCOPY;  Service: Gastroenterology   • CORONARY ARTERY BYPASS GRAFT  1993    x2   • CORONARY STENT PLACEMENT  2019    X 3   • JOINT REPLACEMENT Left     left ankle   • SHOULDER SURGERY Right     with chano   • WRIST SURGERY Left      Social History     Socioeconomic History   • Marital status:      Spouse name: Not on file   • Number of children: Not on file   • Years of education: Not on file   • Highest education level: Not on file   Tobacco Use   • Smoking status: Never Smoker   • Smokeless tobacco: Never Used   Substance and Sexual Activity   • Alcohol use: Yes     Comment: occasional   • Drug use: No   • Sexual activity: Defer     Family History   Problem Relation Age of Onset   • Diabetes Mother         type II   • Heart failure Mother         congestive heart failure   • Colon polyps Neg Hx    • Esophageal cancer Neg Hx    • Rectal cancer Neg Hx    • Colon cancer Neg Hx        Labs:  WBC WBC   Date Value Ref Range Status   10/05/2020 7.10 3.40 - 10.80 10*3/mm3 Final   10/03/2020 6.53 3.40 - 10.80 10*3/mm3 Final      HGB Hemoglobin   Date Value Ref Range Status   10/05/2020 13.4 13.0 - 17.7 g/dL Final   10/03/2020 13.9 13.0 - 17.7 g/dL Final      HCT Hematocrit   Date Value Ref Range Status   10/05/2020 38.6 37.5 - 51.0 % Final   10/03/2020 39.8 37.5 - 51.0 % Final      Platlets Platelets   Date Value Ref Range Status   10/05/2020 142 140 - 450 10*3/mm3 Final   10/03/2020 162 140 - 450 10*3/mm3 Final      MCV MCV   Date Value Ref Range Status   10/05/2020 91.7 79.0 - 97.0 fL Final   10/03/2020 90.9 79.0 - 97.0 fL Final        Results from last 7 days   Lab Units 10/05/20  0253 10/03/20  1426   SODIUM mmol/L 139  139   POTASSIUM mmol/L 3.8 3.8   CHLORIDE mmol/L 106 105   CO2 mmol/L 24.0 24.0   BUN mg/dL 11 13   CREATININE mg/dL 0.77 0.82   CALCIUM mg/dL 8.9 10.0   BILIRUBIN mg/dL  --  0.6   ALK PHOS U/L  --  71   ALT (SGPT) U/L  --  25   AST (SGOT) U/L  --  30   GLUCOSE mg/dL 133* 138*     Lab Results   Component Value Date    CKMB 1.61 07/07/2016    TROPONINI <0.012 07/07/2016    TROPONINT 0.198 (C) 10/04/2020     PT/INR:    Protime   Date Value Ref Range Status   10/03/2020 13.4 11.9 - 14.6 Seconds Final   /  INR   Date Value Ref Range Status   10/03/2020 1.06 0.91 - 1.09 Final       Imaging Results (Last 72 Hours)     Procedure Component Value Units Date/Time    XR Chest 1 View [[id number]] Collected: 10/03/20 1528     Updated: 10/03/20 1532    Narrative:      Frontal upright radiograph of the chest 10/3/2020 3:20 PM CDT     COMPARISON: July 6, 2016.     FINDINGS:   The lungs are clear. The cardiac silhouette is normal. Wires are present  from previous median sternotomy.      The osseous structures and surrounding soft tissues demonstrate no acute  abnormality.       Impression:      1. No radiographic evidence of acute cardiopulmonary process.        This report was finalized on 10/03/2020 15:29 by Dr. Brad Pina MD.          Objective     Allergies   Allergen Reactions   • Demerol [Meperidine]    • Morphine And Related Itching       Medication Review: Performed  Current Facility-Administered Medications   Medication Dose Route Frequency Provider Last Rate Last Dose   • acetaminophen (TYLENOL) tablet 650 mg  650 mg Oral Q4H PRN Storm Encinas MD       • aspirin EC tablet 81 mg  81 mg Oral Daily Storm Encinas MD   81 mg at 10/05/20 0801   • enoxaparin (LOVENOX) syringe 40 mg  40 mg Subcutaneous Q24H Storm Encinas MD   40 mg at 10/05/20 0801   • influenza vac split quad (FLUZONE,FLUARIX,AFLURIA,FLULAVAL) injection 0.5 mL  0.5 mL Intramuscular During Hospitalization Storm Encinas MD       • metoprolol succinate XL  "(TOPROL-XL) 24 hr tablet 50 mg  50 mg Oral Nightly Storm Encinas MD   50 mg at 10/04/20 2112   • nitroglycerin (TRIDIL) 200 mcg/ml infusion  10-50 mcg/min Intravenous Titrated Storm Encinas MD   Stopped at 10/04/20 0050   • ramipril (ALTACE) capsule 2.5 mg  2.5 mg Oral Nightly Storm Encinas MD   2.5 mg at 10/04/20 2112   • rosuvastatin (CRESTOR) tablet 10 mg  10 mg Oral Nightly Storm Encinas MD   10 mg at 10/04/20 2112   • sodium chloride 0.9 % flush 10 mL  10 mL Intravenous PRN Storm Encinas MD       • sodium chloride 0.9 % flush 10 mL  10 mL Intravenous Q12H Storm Encinas MD   10 mL at 10/05/20 0801   • sodium chloride 0.9 % flush 10 mL  10 mL Intravenous PRN Storm Encinas MD       • sodium chloride 0.9 % infusion  50 mL/hr Intravenous Continuous Storm Encinas MD 50 mL/hr at 10/04/20 1246 50 mL/hr at 10/04/20 1246   • sodium chloride 0.9 % infusion  100 mL/hr Intravenous Continuous Storm Encinas  mL/hr at 10/05/20 0001 100 mL/hr at 10/05/20 0001   • ticagrelor (BRILINTA) tablet 90 mg  90 mg Oral Q12H Storm Encinas MD   90 mg at 10/05/20 0801   • traZODone (DESYREL) tablet 50 mg  50 mg Oral Nightly PRN Storm Encinas MD           Vital Sign Min/Max for last 24 hours  Temp  Min: 97.6 °F (36.4 °C)  Max: 98.6 °F (37 °C)   BP  Min: 114/68  Max: 126/69   Pulse  Min: 58  Max: 70   Resp  Min: 16  Max: 20   SpO2  Min: 94 %  Max: 98 %   Flow (L/min)  Min: 2  Max: 2   Weight  Min: 116 kg (255 lb 9.6 oz)  Max: 116 kg (255 lb 11.7 oz)     Flowsheet Rows      First Filed Value   Admission Height  182.9 cm (72\") Documented at 10/03/2020 1421   Admission Weight  117 kg (258 lb) Documented at 10/03/2020 1421          Results for orders placed during the hospital encounter of 09/01/20   Adult Transthoracic Echo Complete W/ Cont if Necessary Per Protocol    Narrative · Estimated EF = 54%.  · Left ventricular systolic function is normal.  · Left atrial cavity size is mildly dilated.  · No evidence of pulmonary hypertension is " present.  · Left ventricular diastolic function is normal. Normal left atrial   pressure.  · Abnormal global Longitudinal LV strain = -9.3%.            Consults:   Consults     No orders found from 9/4/2020 to 10/4/2020.          Procedures Performed  Procedure(s):  Cardiac Catheterization/Vascular Study 6F Right groin        Physical Exam:  General Appearance: Awake, alert, in no acute distress  Eyes: Pupils equal and reactive    Ears: Appear intact with no abnormalities noted  Nose: Nares normal, no drainage  Neck: supple, trachea midline, no carotid bruit and no JVD  Back: no kyphosis present,    Lungs: respirations regular, respirations even and respirations unlabored  Heart: normal S1, S2, no murmurs gallops or rubs  Abdomen: normal bowel sounds, no masses, no hepatomegaly, no splenomegaly, guarding and no rebound tenderness   Skin: no bleeding, bruising or rash  Extremities: no cyanosis  Psychiatric/Behavioral: Negative for agitation, behavioral problems, confusion, the patient does not appear to be nervous/anxious.     Right groin: Arteriotomy site healthy,  No bleeding, swelling or excessive bruising. Preserved distal pulses.      Results Review:   I reviewed the patient's new clinical results.  I reviewed the patient's new imaging results and agree with the interpretation.  I reviewed the patient's other test results and agree with the interpretation  I personally viewed and interpreted the patient's EKG/Telemetry data  Discussed with patient as well as his wife both yesterday and today.      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 male  presented with chest pain as well as elevated cardiac biomarkers  Underwent cardiac catheterization with findings and results as below  Conclusion    · Mid LAD lesion is 100% stenosed.  · Prox RCA lesion is 100% stenosed. Vessel two-vessel collaterals noted from proximal to distal right coronary arteryAlso collaterals noted to obtuse marginal branch  · Origin to Prox Graft lesion is 100% stenosed.  · Mild systolic dysfunction.  · The ejection fraction was 40-50% by visual estimate.  · No mitral valve regurgitation.  · No aortic valve stenosis.  · Left ventricular end diastolic pressure: 21 mmHg  · As complete occlusion of saphenous venous graft to right coronary artery there is no indication for intervention        In all probability patient had a myocardial infarction approximately 8 to 10 days ago when he has severe substernal chest pain while at work.  Currently his symptoms are reflective of postinfarction angina  Given complete occlusion of saphenous venous graft there is no indication for revascularization of occluded grafts  We will check echocardiogram prior to discharge      Condition on Discharge: Stable and Improved  ______________________________________________________________________________________________________________________________________________________________________________________________________________________________       Plan on discharge  ______________________________________________________________________________________________________________________________________________________________________________________________________________________________       OK to discharge  Low salt cardiac diet.   Discharge to home and or self care with improvement or resolution of presenting symptoms or complaints  Ambulating    No heavy lifting for 5-7 days greater than 10 pounds.  No heavy or strenuous pushing or pulling.    No tub baths, hot tubs, swimming pools, or submerging groin  underwater for 1 week.  Wash groin site daily with antibacterial soap and water. Rinse and keep clean and dry.    No lotions, powders, or topical ointments to site. Keep open to air and dry.  Call our office with any concerns or if you notice redness, swelling, drainage, warmth, or pain at the site.     Optimal medical therapy  Deep vein thrombosis precautions with hydration and increasing mobility  Counseled regarding disease appropriate diet, fluid, sodium, caffeine, stimulants intake   Stressed compliance to diet and medications   Avoid NSAIDS    Follow up with primary provider and primary cardiologist as scheduled   Overall improved and deemed fit for discharge  Will be provided with written discharge instructions including diet and medications including prescriptions as required and labs as applicable    Questions were encouraged, asked and answered to the patient's  understanding and satisfaction.    Go to nearest emergency room if recurrence of primary complaints or any suspected disabling or life threatening symptoms or complaints such as chest pain, increasing shortness of breath, profound dizziness, weakness, significant palpitations, passing out    episodes, cold sweats, excessive nausea etc  35  minutes spent in the discharge process.   _______________________________________________________________________________________________________________________________________________________________________________________________________________________________________    Discharge Disposition: To home and self care      Discharge Medications     Discharge Medications      ASK your doctor about these medications      Instructions Start Date   aspirin 81 MG tablet   81 mg, Oral, Nightly      CENTRUM CARDIO PO   1 tablet, Oral, Nightly      Foltx 2.5-25-2 MG tablet tablet  Generic drug: folic acid-pyridoxine-cyanocobalamin   1 tablet, Oral, Nightly      metoprolol succinate XL 50 MG 24 hr tablet  Commonly known  as: TOPROL-XL   50 mg, Oral, Nightly      nitroglycerin 0.4 MG SL tablet  Commonly known as: NITROSTAT   1 under the tongue as needed for angina, may repeat q5mins for up three doses      ramipril 2.5 MG capsule  Commonly known as: ALTACE   2.5 mg, Oral, Nightly      rosuvastatin 10 MG tablet  Commonly known as: CRESTOR   10 mg, Oral, Nightly      ticagrelor 90 MG tablet tablet  Commonly known as: BRILINTA   90 mg, Oral, Every 12 Hours Scheduled      TRINTELLIX 20 MG tablet  Generic drug: Vortioxetine HBr   20 mg, Oral, Daily             Discharge Diet:  Low salt heart healthy cardiac diet    Activity at Discharge:  As tolerated    Follow-up Appointments  Future Appointments   Date Time Provider Department Center   2/8/2021  8:30 AM Fatmata Beyer APRN [facility]         Test Results Pending at Discharge: None       Storm Encinas MD  10/05/20  16:10 CDT        Electronically signed by Storm Encinas MD at 10/05/20 5503

## 2020-10-06 NOTE — OUTREACH NOTE
Prep Survey      Responses   [facility] patient discharged from?  [facility]   Is LACE score < 7 ?  Yes   Eligibility  Readm Mgmt   Discharge diagnosis  NSTEMI    Does the patient have one of the following disease processes/diagnoses(primary or secondary)?  Acute MI (STEMI,NSTEMI)   Does the patient have Home health ordered?  No   Is there a DME ordered?  No   Prep survey completed?  Yes          Nohemy Hinson RN

## 2020-10-07 ENCOUNTER — READMISSION MANAGEMENT (OUTPATIENT)
Dept: CALL CENTER | Facility: HOSPITAL | Age: 65
End: 2020-10-07

## 2020-10-07 NOTE — OUTREACH NOTE
AMI Week 1 Survey      Responses   [facility] patient discharged from?  [place]   Does the patient have one of the following disease processes/diagnoses(primary or secondary)?  Acute MI (STEMI,NSTEMI)   Week 1 attempt successful?  Yes   Call start time  1634   Call end time  1642   Discharge diagnosis  NSTEMI    Meds reviewed with patient/caregiver?  Yes   Is the patient having any side effects they believe may be caused by any medication additions or changes?  No   Does the patient have all prescriptions related to this admission filled (includes statins,anticoagulants,HTN meds,anti-arrhythmia meds)  N/A   Is the patient taking all medications as directed (includes completed medication regime)?  Yes   Does the patient have a primary care provider?   Yes   Does the patient have an appointment with their PCP,cardiologist,or clinic within 7 days of discharge?  No   What is preventing the patient from scheduling follow up appointments within 7 days of discharge?  Waiting on return call   Nursing Interventions  Advised patient to make appointment   Has the patient kept scheduled appointments due by today?  N/A   Psychosocial issues?  No   Comments  C right groin no bruising or pain. He denies chest pain just feeling tired.    Did the patient receive a copy of their discharge instructions?  Yes   Nursing interventions  Reviewed instructions with patient   What is the patient's perception of their health status since discharge?  Improving   Nursing interventions  Nurse provided patient education   Is the patient/caregiver able to teach back signs and symptoms of when to call for help immediately:  Sudden chest discomfort, Sudden discomfort in arms, back, neck or jaw, Shortness of breath at any time, Sudden sweating or clammy skin   Is the patient/caregiver able to teach back ways to prevent a second heart attack:  Take medications   Week 1 call completed?  Yes          Mona Nielsen RN

## 2020-10-20 PROBLEM — E66.811 CLASS 1 OBESITY DUE TO EXCESS CALORIES WITH SERIOUS COMORBIDITY AND BODY MASS INDEX (BMI) OF 34.0 TO 34.9 IN ADULT: Status: ACTIVE | Noted: 2020-10-20

## 2020-10-20 PROBLEM — Z95.1 PRESENCE OF CORONARY ARTERY BYPASS GRAFT STENT: Status: ACTIVE | Noted: 2020-10-20

## 2020-10-20 PROBLEM — E66.09 CLASS 1 OBESITY DUE TO EXCESS CALORIES WITH SERIOUS COMORBIDITY AND BODY MASS INDEX (BMI) OF 34.0 TO 34.9 IN ADULT: Status: ACTIVE | Noted: 2020-10-20

## 2020-10-20 PROBLEM — Z95.5 PRESENCE OF CORONARY ARTERY BYPASS GRAFT STENT: Status: ACTIVE | Noted: 2020-10-20

## 2020-10-20 NOTE — PROGRESS NOTES
Subjective:     Encounter Date:10/22/2020      Patient ID: Lio Velasquez is a 64 y.o. male   HPI: This patient presents today for routine follow-up of hospital discharge on 10/5/2020 for NSTEMI.  Left heart catheterization during that admission revealed widely patent previously placed proximal LAD stent, 100% stenosis of mid LAD, 100% stenosis of proximal RCA, patent LIMA to the LAD and complete occlusion of the saphenous vein graft to the right coronary artery with vessel to vessel collaterals noted from the proximal to distal right coronary artery as well as the obtuse marginal branch.  As saphenous vein graft to the right coronary artery was 100% occluded, no PCI was performed.  He has a history of coronary artery disease status post CABG x2 (LIMA to LAD and SVG to RCA) with subsequent 3.25 x 38 mm stent to the saphenous vein graft to the right coronary artery on 7/8/2016 and 3.5 x 38 mm Jamee drug-eluting stent to the mid saphenous vein graft to the right coronary artery and 3.5 x 23 mm Jamee drug-eluting stent to the distal saphenous vein graft to the right coronary artery 8/12/2019, hypertension, hyperlipidemia, obstructive sleep apnea and obesity. He continues to complain of sharp, exertional, substernal chest pain that does not radiate. The pain is associated with shortness of breath and is relieved with rest. The pain has been ongoing for one month, prior to his recent heart cath. He reports that pain occurs with minimal exertion. Patient denies palpitations, dizziness, syncope, orthopnea, PND, edema or decreased stamina.  Patient denies any signs of bleeding.    Chief Complaint: Routine follow-up  Coronary Artery Disease  Presents for follow-up visit. Symptoms include chest pain and shortness of breath. Pertinent negatives include no chest pressure, chest tightness, dizziness, leg swelling, muscle weakness, palpitations or weight gain. Risk factors include hyperlipidemia and obesity. The symptoms have  been stable. Compliance with diet is variable. Compliance with exercise is variable. Compliance with medications is good.   Hypertension  This is a chronic problem. The current episode started more than 1 year ago. The problem is controlled. Associated symptoms include chest pain and shortness of breath. Pertinent negatives include no anxiety, blurred vision, headaches, malaise/fatigue, neck pain, orthopnea, palpitations, peripheral edema, PND or sweats. Risk factors for coronary artery disease include male gender, obesity and dyslipidemia. Current antihypertension treatment includes beta blockers and ACE inhibitors. The current treatment provides significant improvement. Hypertensive end-organ damage includes CAD/MI.   Hyperlipidemia  This is a chronic problem. The current episode started more than 1 year ago. Exacerbating diseases include obesity. Associated symptoms include chest pain and shortness of breath. Current antihyperlipidemic treatment includes statins. Risk factors for coronary artery disease include hypertension, male sex, obesity and dyslipidemia.       Previous Cardiac Testing:  Results for orders placed during the hospital encounter of 10/03/20   Adult Transthoracic Echo Complete W/ Cont if Necessary Per Protocol    Narrative · Left ventricular ejection fraction appears to be 56 - 60%. Left   ventricular systolic function is normal.  · Normal diastolic function  · No pulmonary hypertension        Results for orders placed during the hospital encounter of 10/03/20   Cardiac Catheterization/Vascular Study    Narrative · Mid LAD lesion is 100% stenosed.  · Prox RCA lesion is 100% stenosed. Vessel two-vessel collaterals noted   from proximal to distal right coronary arteryAlso collaterals noted to   obtuse marginal branch  · Origin to Prox Graft lesion is 100% stenosed.  · Mild systolic dysfunction.  · The ejection fraction was 40-50% by visual estimate.  · No mitral valve regurgitation.  · No aortic  valve stenosis.  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    Allergies   Allergen Reactions   • Demerol [Meperidine]    • Morphine And Related Itching       Current Outpatient Medications:   •  aspirin 81 MG tablet, Take 81 mg by mouth every night., Disp: , Rfl:   •  folic acid-pyridoxine-cyanocobalamin (FOLTX) 2.5-25-2 MG tablet tablet, Take 1 tablet by mouth every night., Disp: , Rfl:   •  metoprolol succinate XL (TOPROL-XL) 50 MG 24 hr tablet, Take 50 mg by mouth every night., Disp: , Rfl:   •  Multiple Vitamins-Minerals (CENTRUM CARDIO PO), Take 1 tablet by mouth Every Night., Disp: , Rfl:   •  nitroglycerin (NITROSTAT) 0.4 MG SL tablet, 1 under the tongue as needed for angina, may repeat q5mins for up three doses, Disp: 100 tablet, Rfl: 11  •  ramipril (ALTACE) 2.5 MG capsule, Take 2.5 mg by mouth every night., Disp: , Rfl:   •  rosuvastatin (CRESTOR) 10 MG tablet, Take 1 tablet by mouth Every Night., Disp: 90 tablet, Rfl: 3  •  ticagrelor (BRILINTA) 90 MG tablet tablet, Take 1 tablet by mouth Every 12 (Twelve) Hours., Disp: 180 tablet, Rfl: 3  •  Vortioxetine HBr (TRINTELLIX) 20 MG tablet, Take 20 mg by mouth Daily., Disp: , Rfl:   •  isosorbide mononitrate (IMDUR) 30 MG 24 hr tablet, Take 1 tablet by mouth Daily., Disp: 30 tablet, Rfl: 11  Past Medical History:   Diagnosis Date   • Aortocoronary bypass status    • CAD in native artery    • Chest pain    • Coronary angioplasty status    • Hyperlipidemia     unspecified   • Hypertension, benign    • Obesity    • Sleep apnea     unspecified     Past Surgical History:   Procedure Laterality Date   • ANKLE  SURGERY      with screws    • CARDIAC CATHETERIZATION Left 03/05/2012 7/2016   • CARDIAC CATHETERIZATION Left 8/12/2019    Procedure: Cardiac Catheterization/Vascular Study BRIANNA OK;  Surgeon: Storm Encinas MD;  Location:  [facility];  Service: Cardiology   • CARDIAC CATHETERIZATION Left 10/4/2020    Procedure: Cardiac Catheterization/Vascular Study 6F Right groin ;  Surgeon: Storm Encinas MD;  Location:  [facility];  Service: Cardiology;  Laterality: Left;   • COLONOSCOPY  12/30/2008   • COLONOSCOPY  12/30/2013    two polyps removed from the sigmoid colon   • COLONOSCOPY N/A 6/21/2019    Procedure: COLONOSCOPY WITH ANESTHESIA;  Surgeon: Marciano Jaramillo DO;  Location:  [facility];  Service: Gastroenterology   • CORONARY ARTERY BYPASS GRAFT  1993    x2   • CORONARY STENT PLACEMENT  2019    X 3   • JOINT REPLACEMENT Left     left ankle   • SHOULDER SURGERY Right     with chano   • WRIST SURGERY Left      Family History   Problem Relation Age of Onset   • Diabetes Mother         type II   • Heart failure Mother         congestive heart failure   • Colon polyps Neg Hx    • Esophageal cancer Neg Hx    • Rectal cancer Neg Hx    • Colon cancer Neg Hx      Social History     Socioeconomic History   • Marital status:      Spouse name: Not on file   • Number of children: Not on file   • Years of education: Not on file   • Highest education level: Not on file   Tobacco Use   • Smoking status: Never Smoker   • Smokeless tobacco: Never Used   Substance and Sexual Activity   • Alcohol use: Not Currently     Comment: occasional   • Drug use: No   • Sexual activity: Defer       Review of Systems   Constitution: Negative for chills, decreased appetite, fever, malaise/fatigue, night sweats, weight gain and weight loss.   HENT: Negative for nosebleeds.    Eyes: Negative for blurred vision and visual disturbance.   Cardiovascular: Positive for chest pain. Negative for dyspnea on  exertion, leg swelling, near-syncope, orthopnea, palpitations, paroxysmal nocturnal dyspnea and syncope.   Respiratory: Positive for shortness of breath. Negative for chest tightness, cough, hemoptysis, snoring and wheezing.    Endocrine: Negative for cold intolerance and heat intolerance.   Hematologic/Lymphatic: Does not bruise/bleed easily.   Skin: Negative for rash.   Musculoskeletal: Negative for back pain, falls, muscle weakness and neck pain.   Gastrointestinal: Negative for abdominal pain, change in bowel habit, constipation, diarrhea, dysphagia, heartburn, nausea and vomiting.   Genitourinary: Negative for hematuria.   Neurological: Negative for dizziness, headaches, light-headedness and weakness.   Psychiatric/Behavioral: Negative for altered mental status.   Allergic/Immunologic: Negative for persistent infections.              Objective:     Vitals signs and nursing note reviewed.   Constitutional:       General: Awake.      Appearance: Normal and healthy appearance. Well-developed, normal weight and not in distress.   Eyes:      General: Lids are normal.      Conjunctiva/sclera: Conjunctivae normal.      Pupils: Pupils are equal, round, and reactive to light.   HENT:      Head: Normocephalic and atraumatic.      Nose: Nose normal.   Neck:      Musculoskeletal: Normal range of motion.      Vascular: No JVR. JVD normal.   Pulmonary:      Effort: Pulmonary effort is normal.      Breath sounds: Normal breath sounds. No wheezing. No rhonchi. No rales.   Chest:      Chest wall: Not tender to palpatation.   Cardiovascular:      PMI at left midclavicular line. Normal rate. Regular rhythm. Normal S1. Normal S2.      Murmurs: There is no murmur.      No gallop. No click. No rub.   Pulses:     Intact distal pulses.   Edema:     Peripheral edema absent.   Abdominal:      General: Bowel sounds are normal.      Palpations: Abdomen is soft.      Tenderness: There is no abdominal tenderness.   Musculoskeletal: Normal  "range of motion.         General: No tenderness.   Skin:     General: Skin is warm and dry.   Neurological:      General: No focal deficit present.      Mental Status: Alert, oriented to person, place, and time and oriented to person, place and time.   Psychiatric:         Attention and Perception: Attention and perception normal.         Mood and Affect: Mood and affect normal.         Speech: Speech normal.         Behavior: Behavior normal. Behavior is cooperative.         Thought Content: Thought content normal.         Cognition and Memory: Cognition and memory normal.         Judgment: Judgment normal.             ECG 12 Lead    Date/Time: 10/22/2020 2:49 PM  Performed by: Fatmata Beyer APRN  Authorized by: Fatmata Beyer APRN   Comparison: compared with previous ECG from 10/4/2020  Rhythm: sinus rhythm  Rate: normal  BPM: 61  T inversion: II, III, aVF and V6            /72   Pulse 61   Ht 182.9 cm (72\")   Wt 117 kg (257 lb)   SpO2 98%   BMI 34.86 kg/m²   Lab Review:   Lab Results   Component Value Date    WBC 7.10 10/05/2020    HGB 13.4 10/05/2020    HCT 38.6 10/05/2020    MCV 91.7 10/05/2020     10/05/2020     Lab Results   Component Value Date    GLUCOSE 133 (H) 10/05/2020    BUN 11 10/05/2020    CREATININE 0.77 10/05/2020    EGFRIFNONA 102 10/05/2020    BCR 14.3 10/05/2020    K 3.8 10/05/2020    CO2 24.0 10/05/2020    CALCIUM 8.9 10/05/2020    ALBUMIN 4.50 10/03/2020    AST 30 10/03/2020    ALT 25 10/03/2020     Lab Results   Component Value Date    CHOL 119 10/04/2020    CHOL 129 (L) 08/13/2019    CHLPL 143 07/07/2016     Lab Results   Component Value Date    TRIG 192 (H) 10/04/2020    TRIG 180 (H) 08/13/2019    TRIG 180 (H) 07/07/2016     Lab Results   Component Value Date    HDL 28 (L) 10/04/2020    HDL 27 (L) 08/13/2019    HDL 31 07/07/2016     Lab Results   Component Value Date    LDL 53 10/04/2020    LDL 87 08/13/2019    LDL 93 07/07/2016       I have reviewed the most recent " lab results.       Assessment:          Diagnosis Plan   1. Coronary artery disease of bypass graft of native heart with stable angina pectoris (CMS/HCC)  Start Imdur 30 mg daily.    2. S/P CABG x 2  LIMA to LAD and SVG to RCA (occluded) 1993   3. Stented coronary artery   Continues on aspirin and Brilinta. Denies bleeding.    4. Presence of coronary artery bypass graft stent  SVG to RCA X3 most recent 8/12/19   5. Essential hypertension  Well controlled.    6. Mixed hyperlipidemia  Managed by PCP. On statin. Well controlled.    7. Obstructive sleep apnea on CPAP  Compliant.    8. Class 1 obesity due to excess calories with serious comorbidity and body mass index (BMI) of 34.0 to 34.9 in adult  Patient's Body mass index is 34.86 kg/m². BMI is above normal parameters. Recommendations include: educational material.            Plan:         1. Start Imdur 30 mg daily. Continue other medications as previously prescribed.  2. Report any worsening symptoms.  3. Report any signs of bleeding.  4. Continue heart healthy diet and regular exercise as tolerated.   5. Follow up with PCP for blood pressure and cholesterol management and routine lab work.  6. Follow up in two weeks, or sooner if needed. Consider up titration of Imdur/initiation of Ranexa at that time, if symptoms persist.     Current outpatient and discharge medications have been reconciled for the patient.  Reviewed by: RODGER Acosta

## 2020-10-22 ENCOUNTER — OFFICE VISIT (OUTPATIENT)
Dept: CARDIOLOGY | Facility: CLINIC | Age: 65
End: 2020-10-22

## 2020-10-22 VITALS
WEIGHT: 257 LBS | SYSTOLIC BLOOD PRESSURE: 126 MMHG | BODY MASS INDEX: 34.81 KG/M2 | HEART RATE: 61 BPM | OXYGEN SATURATION: 98 % | HEIGHT: 72 IN | DIASTOLIC BLOOD PRESSURE: 72 MMHG

## 2020-10-22 DIAGNOSIS — G47.33 OBSTRUCTIVE SLEEP APNEA ON CPAP: Chronic | ICD-10-CM

## 2020-10-22 DIAGNOSIS — I10 ESSENTIAL HYPERTENSION: Chronic | ICD-10-CM

## 2020-10-22 DIAGNOSIS — Z95.5 PRESENCE OF CORONARY ARTERY BYPASS GRAFT STENT: ICD-10-CM

## 2020-10-22 DIAGNOSIS — Z95.1 PRESENCE OF CORONARY ARTERY BYPASS GRAFT STENT: ICD-10-CM

## 2020-10-22 DIAGNOSIS — Z99.89 OBSTRUCTIVE SLEEP APNEA ON CPAP: Chronic | ICD-10-CM

## 2020-10-22 DIAGNOSIS — I25.708 CORONARY ARTERY DISEASE OF BYPASS GRAFT OF NATIVE HEART WITH STABLE ANGINA PECTORIS (HCC): Primary | ICD-10-CM

## 2020-10-22 DIAGNOSIS — Z95.5 STENTED CORONARY ARTERY: ICD-10-CM

## 2020-10-22 DIAGNOSIS — Z95.1 S/P CABG X 2: ICD-10-CM

## 2020-10-22 DIAGNOSIS — E78.2 MIXED HYPERLIPIDEMIA: Chronic | ICD-10-CM

## 2020-10-22 DIAGNOSIS — E66.09 CLASS 1 OBESITY DUE TO EXCESS CALORIES WITH SERIOUS COMORBIDITY AND BODY MASS INDEX (BMI) OF 34.0 TO 34.9 IN ADULT: ICD-10-CM

## 2020-10-22 PROCEDURE — 99214 OFFICE O/P EST MOD 30 MIN: CPT | Performed by: NURSE PRACTITIONER

## 2020-10-22 PROCEDURE — 93000 ELECTROCARDIOGRAM COMPLETE: CPT | Performed by: NURSE PRACTITIONER

## 2020-10-22 RX ORDER — ISOSORBIDE MONONITRATE 30 MG/1
30 TABLET, EXTENDED RELEASE ORAL DAILY
Qty: 30 TABLET | Refills: 11 | Status: SHIPPED | OUTPATIENT
Start: 2020-10-22 | End: 2020-11-10 | Stop reason: SDUPTHER

## 2020-10-22 RX ORDER — ISOSORBIDE MONONITRATE 30 MG/1
30 TABLET, EXTENDED RELEASE ORAL DAILY
Qty: 30 TABLET | Refills: 11 | Status: SHIPPED | OUTPATIENT
Start: 2020-10-22 | End: 2020-10-22 | Stop reason: SDUPTHER

## 2020-11-09 NOTE — PATIENT INSTRUCTIONS
Exercising to Lose Weight  Exercise is structured, repetitive physical activity to improve fitness and health. Getting regular exercise is important for everyone. It is especially important if you are overweight. Being overweight increases your risk of heart disease, stroke, diabetes, high blood pressure, and several types of cancer. Reducing your calorie intake and exercising can help you lose weight.  Exercise is usually categorized as moderate or vigorous intensity. To lose weight, most people need to do a certain amount of moderate-intensity or vigorous-intensity exercise each week.  Moderate-intensity exercise    Moderate-intensity exercise is any activity that gets you moving enough to burn at least three times more energy (calories) than if you were sitting.  Examples of moderate exercise include:  · Walking a mile in 15 minutes.  · Doing light yard work.  · Biking at an easy pace.  Most people should get at least 150 minutes (2 hours and 30 minutes) a week of moderate-intensity exercise to maintain their body weight.  Vigorous-intensity exercise  Vigorous-intensity exercise is any activity that gets you moving enough to burn at least six times more calories than if you were sitting. When you exercise at this intensity, you should be working hard enough that you are not able to carry on a conversation.  Examples of vigorous exercise include:  · Running.  · Playing a team sport, such as football, basketball, and soccer.  · Jumping rope.  Most people should get at least 75 minutes (1 hour and 15 minutes) a week of vigorous-intensity exercise to maintain their body weight.  How can exercise affect me?  When you exercise enough to burn more calories than you eat, you lose weight. Exercise also reduces body fat and builds muscle. The more muscle you have, the more calories you burn. Exercise also:  · Improves mood.  · Reduces stress and tension.  · Improves your overall fitness, flexibility, and  endurance.  · Increases bone strength.  The amount of exercise you need to lose weight depends on:  · Your age.  · The type of exercise.  · Any health conditions you have.  · Your overall physical ability.  Talk to your health care provider about how much exercise you need and what types of activities are safe for you.  What actions can I take to lose weight?  Nutrition    · Make changes to your diet as told by your health care provider or diet and nutrition specialist (dietitian). This may include:  ? Eating fewer calories.  ? Eating more protein.  ? Eating less unhealthy fats.  ? Eating a diet that includes fresh fruits and vegetables, whole grains, low-fat dairy products, and lean protein.  ? Avoiding foods with added fat, salt, and sugar.  · Drink plenty of water while you exercise to prevent dehydration or heat stroke.  Activity  · Choose an activity that you enjoy and set realistic goals. Your health care provider can help you make an exercise plan that works for you.  · Exercise at a moderate or vigorous intensity most days of the week.  ? The intensity of exercise may vary from person to person. You can tell how intense a workout is for you by paying attention to your breathing and heartbeat. Most people will notice their breathing and heartbeat get faster with more intense exercise.  · Do resistance training twice each week, such as:  ? Push-ups.  ? Sit-ups.  ? Lifting weights.  ? Using resistance bands.  · Getting short amounts of exercise can be just as helpful as long structured periods of exercise. If you have trouble finding time to exercise, try to include exercise in your daily routine.  ? Get up, stretch, and walk around every 30 minutes throughout the day.  ? Go for a walk during your lunch break.  ? Park your car farther away from your destination.  ? If you take public transportation, get off one stop early and walk the rest of the way.  ? Make phone calls while standing up and walking  around.  ? Take the stairs instead of elevators or escalators.  · Wear comfortable clothes and shoes with good support.  · Do not exercise so much that you hurt yourself, feel dizzy, or get very short of breath.  Where to find more information  · [place] Department of Health and Human Services: www.hhs.gov  · Centers for Disease Control and Prevention (CDC): www.cdc.gov  Contact a health care provider:  · Before starting a new exercise program.  · If you have questions or concerns about your weight.  · If you have a medical problem that keeps you from exercising.  Get help right away if you have any of the following while exercising:  · Injury.  · Dizziness.  · Difficulty breathing or shortness of breath that does not go away when you stop exercising.  · Chest pain.  · Rapid heartbeat.  Summary  · Being overweight increases your risk of heart disease, stroke, diabetes, high blood pressure, and several types of cancer.  · Losing weight happens when you burn more calories than you eat.  · Reducing the amount of calories you eat in addition to getting regular moderate or vigorous exercise each week helps you lose weight.  This information is not intended to replace advice given to you by your health care provider. Make sure you discuss any questions you have with your health care provider.  Document Released: 01/20/2012 Document Revised: 12/31/2018 Document Reviewed: 12/31/2018  [facility] [facility] Patient Education © 2020 [employer]  Heart-Healthy Eating Plan  Heart-healthy meal planning includes:  · Eating less unhealthy fats.  · Eating more healthy fats.  · Making other changes in your diet.  Talk with your doctor or a diet specialist (dietitian) to create an eating plan that is right for you.  What is my plan?  Your doctor may recommend an eating plan that includes:  · Total fat: ______% or less of total calories a day.  · Saturated fat: ______% or less of total calories a day.  · Cholesterol: less than _________mg a  day.  What are tips for following this plan?  Cooking  Avoid frying your food. Try to bake, boil, grill, or broil it instead. You can also reduce fat by:  · Removing the skin from poultry.  · Removing all visible fats from meats.  · Steaming vegetables in water or broth.  Meal planning    · At meals, divide your plate into four equal parts:  ? Fill one-half of your plate with vegetables and green salads.  ? Fill one-fourth of your plate with whole grains.  ? Fill one-fourth of your plate with lean protein foods.  · Eat 4-5 servings of vegetables per day. A serving of vegetables is:  ? 1 cup of raw or cooked vegetables.  ? 2 cups of raw leafy greens.  · Eat 4-5 servings of fruit per day. A serving of fruit is:  ? 1 medium whole fruit.  ? ¼ cup of dried fruit.  ? ½ cup of fresh, frozen, or canned fruit.  ? ½ cup of 100% fruit juice.  · Eat more foods that have soluble fiber. These are apples, broccoli, carrots, beans, peas, and barley. Try to get 20-30 g of fiber per day.  · Eat 4-5 servings of nuts, legumes, and seeds per week:  ? 1 serving of dried beans or legumes equals ½ cup after being cooked.  ? 1 serving of nuts is ¼ cup.  ? 1 serving of seeds equals 1 tablespoon.  General information  · Eat more home-cooked food. Eat less restaurant, buffet, and fast food.  · Limit or avoid alcohol.  · Limit foods that are high in starch and sugar.  · Avoid fried foods.  · Lose weight if you are overweight.  · Keep track of how much salt (sodium) you eat. This is important if you have high blood pressure. Ask your doctor to tell you more about this.  · Try to add vegetarian meals each week.  Fats  · Choose healthy fats. These include olive oil and canola oil, flaxseeds, walnuts, almonds, and seeds.  · Eat more omega-3 fats. These include salmon, mackerel, sardines, tuna, flaxseed oil, and ground flaxseeds. Try to eat fish at least 2 times each week.  · Check food labels. Avoid foods with trans fats or high amounts of  saturated fat.  · Limit saturated fats.  ? These are often found in animal products, such as meats, butter, and cream.  ? These are also found in plant foods, such as palm oil, palm kernel oil, and coconut oil.  · Avoid foods with partially hydrogenated oils in them. These have trans fats. Examples are stick margarine, some tub margarines, cookies, crackers, and other baked goods.  What foods can I eat?  Fruits  All fresh, canned (in natural juice), or frozen fruits.  Vegetables  Fresh or frozen vegetables (raw, steamed, roasted, or grilled). Green salads.  Grains  Most grains. Choose whole wheat and whole grains most of the time. Rice and pasta, including brown rice and pastas made with whole wheat.  Meats and other proteins  Lean, well-trimmed beef, veal, pork, and lamb. Chicken and turkey without skin. All fish and shellfish. Wild duck, rabbit, pheasant, and venison. Egg whites or low-cholesterol egg substitutes. Dried beans, peas, lentils, and tofu. Seeds and most nuts.  Dairy  Low-fat or nonfat cheeses, including ricotta and mozzarella. Skim or 1% milk that is liquid, powdered, or evaporated. Buttermilk that is made with low-fat milk. Nonfat or low-fat yogurt.  Fats and oils  Non-hydrogenated (trans-free) margarines. Vegetable oils, including soybean, sesame, sunflower, olive, peanut, safflower, corn, canola, and cottonseed. Salad dressings or mayonnaise made with a vegetable oil.  Beverages  Mineral water. Coffee and tea. Diet carbonated beverages.  Sweets and desserts  Sherbet, gelatin, and fruit ice. Small amounts of dark chocolate.  Limit all sweets and desserts.  Seasonings and condiments  All seasonings and condiments.  The items listed above may not be a complete list of foods and drinks you can eat. Contact a dietitian for more options.  What foods should I avoid?  Fruits  Canned fruit in heavy syrup. Fruit in cream or butter sauce. Fried fruit. Limit coconut.  Vegetables  Vegetables cooked in cheese,  cream, or butter sauce. Fried vegetables.  Grains  Breads that are made with saturated or trans fats, oils, or whole milk. Croissants. Sweet rolls. Donuts. High-fat crackers, such as cheese crackers.  Meats and other proteins  Fatty meats, such as hot dogs, ribs, sausage, jones, rib-eye roast or steak. High-fat deli meats, such as salami and bologna. Caviar. Domestic duck and goose. Organ meats, such as liver.  Dairy  Cream, sour cream, cream cheese, and creamed cottage cheese. Whole-milk cheeses. Whole or 2% milk that is liquid, evaporated, or condensed. Whole buttermilk. Cream sauce or high-fat cheese sauce. Yogurt that is made from whole milk.  Fats and oils  Meat fat, or shortening. Cocoa butter, hydrogenated oils, palm oil, coconut oil, palm kernel oil. Solid fats and shortenings, including jones fat, salt pork, lard, and butter. Nondairy cream substitutes. Salad dressings with cheese or sour cream.  Beverages  Regular sodas and juice drinks with added sugar.  Sweets and desserts  Frosting. Pudding. Cookies. Cakes. Pies. Milk chocolate or white chocolate. Buttered syrups. Full-fat ice cream or ice cream drinks.  The items listed above may not be a complete list of foods and drinks to avoid. Contact a dietitian for more information.  Summary  · Heart-healthy meal planning includes eating less unhealthy fats, eating more healthy fats, and making other changes in your diet.  · Eat a balanced diet. This includes fruits and vegetables, low-fat or nonfat dairy, lean protein, nuts and legumes, whole grains, and heart-healthy oils and fats.  This information is not intended to replace advice given to you by your health care provider. Make sure you discuss any questions you have with your health care provider.  Document Released: 06/18/2013 Document Revised: 02/21/2019 Document Reviewed: 01/25/2019  [employer] Patient Education © 2020 [employer]  Obesity, Adult  Obesity is having too much body fat. Being obese means  that your weight is more than what is healthy for you.  BMI is a number that explains how much body fat you have. If you have a BMI of 30 or more, you are obese. Obesity is often caused by eating or drinking more calories than your body uses. Changing your lifestyle can help you lose weight.  Obesity can cause serious health problems, such as:  · Stroke.  · Coronary artery disease (CAD).  · Type 2 diabetes.  · Some types of cancer, including cancers of the colon, breast, uterus, and gallbladder.  · Osteoarthritis.  · High blood pressure (hypertension).  · High cholesterol.  · Sleep apnea.  · Gallbladder stones.  · Infertility problems.  What are the causes?  · Eating meals each day that are high in calories, sugar, and fat.  · Being born with genes that may make you more likely to become obese.  · Having a medical condition that causes obesity.  · Taking certain medicines.  · Sitting a lot (having a sedentary lifestyle).  · Not getting enough sleep.  · Drinking a lot of drinks that have sugar in them.  What increases the risk?  · Having a family history of obesity.  · Being an  woman.  · Being a  man.  · Living in an area with limited access to:  ? [place], recreation centers, or sidewalks.  ? Healthy food choices, such as grocery stores and [employer].  What are the signs or symptoms?  The main sign is having too much body fat.  How is this treated?  · Treatment for this condition often includes changing your lifestyle. Treatment may include:  ? Changing your diet. This may include making a healthy meal plan.  ? Exercise. This may include activity that causes your heart to beat faster (aerobic exercise) and strength training. Work with your doctor to design a program that works for you.  ? Medicine to help you lose weight. This may be used if you are not able to lose 1 pound a week after 6 weeks of healthy eating and more exercise.  ? Treating conditions that cause the  obesity.  ? Surgery. Options may include gastric banding and gastric bypass. This may be done if:  § Other treatments have not helped to improve your condition.  § You have a BMI of 40 or higher.  § You have life-threatening health problems related to obesity.  Follow these instructions at home:  Eating and drinking    · Follow advice from your doctor about what to eat and drink. Your doctor may tell you to:  ? Limit fast food, sweets, and processed snack foods.  ? Choose low-fat options. For example, choose low-fat milk instead of whole milk.  ? Eat 5 or more servings of fruits or vegetables each day.  ? Eat at home more often. This gives you more control over what you eat.  ? Choose healthy foods when you eat out.  ? Learn to read food labels. This will help you learn how much food is in 1 serving.  ? Keep low-fat snacks available.  ? Avoid drinks that have a lot of sugar in them. These include soda, fruit juice, iced tea with sugar, and flavored milk.  · Drink enough water to keep your pee (urine) pale yellow.  · Do not go on fad diets.  Physical activity  · Exercise often, as told by your doctor. Most adults should get up to 150 minutes of moderate-intensity exercise every week.Ask your doctor:  ? What types of exercise are safe for you.  ? How often you should exercise.  · Warm up and stretch before being active.  · Do slow stretching after being active (cool down).  · Rest between times of being active.  Lifestyle  · Work with your doctor and a food expert (dietitian) to set a weight-loss goal that is best for you.  · Limit your screen time.  · Find ways to reward yourself that do not involve food.  · Do not drink alcohol if:  ? Your doctor tells you not to drink.  ? You are pregnant, may be pregnant, or are planning to become pregnant.  · If you drink alcohol:  ? Limit how much you use to:  § 0-1 drink a day for women.  § 0-2 drinks a day for men.  ? Be aware of how much alcohol is in your drink. In the [place]  one drink equals one 12 oz bottle of beer (355 mL), one 5 oz glass of wine (148 mL), or one 1½ oz glass of hard liquor (44 mL).  General instructions  · Keep a weight-loss journal. This can help you keep track of:  ? The food that you eat.  ? How much exercise you get.  · Take over-the-counter and prescription medicines only as told by your doctor.  · Take vitamins and supplements only as told by your doctor.  · Think about joining a support group.  · Keep all follow-up visits as told by your doctor. This is important.  Contact a doctor if:  · You cannot meet your weight loss goal after you have changed your diet and lifestyle for 6 weeks.  Get help right away if you:  · Are having trouble breathing.  · Are having thoughts of harming yourself.  Summary  · Obesity is having too much body fat.  · Being obese means that your weight is more than what is healthy for you.  · Work with your doctor to set a weight-loss goal.  · Get regular exercise as told by your doctor.  This information is not intended to replace advice given to you by your health care provider. Make sure you discuss any questions you have with your health care provider.  Document Released: 03/11/2013 Document Revised: 08/22/2019 Document Reviewed: 08/22/2019  [employer] Patient Education © 2020 [employer]

## 2020-11-09 NOTE — PROGRESS NOTES
Subjective:     Encounter Date:11/10/2020      Patient ID: Lio Velasquez is a 64 y.o. male   HPI: This patient presents today for routine follow-up of medication adjustments.  Imdur 30 mg daily was initiated at his last office visit on 10/22/2020.  He reports decrease in severity of chest pain since that time. He has a history of coronary artery disease status post CABG x2 (LIMA to LAD and SVG to RCA) with subsequent 3.25 x 38 mm stent to the saphenous vein graft to the right coronary artery on 7/8/2016 and 3.5 x 38 mm Jamee drug-eluting stent to the mid saphenous vein graft to the right coronary artery and 3.5 x 23 mm Jamee drug-eluting stent to the distal saphenous vein graft to the right coronary artery 8/12/2019, hypertension, hyperlipidemia, obstructive sleep apnea and obesity.Left heart catheterization on 10/4/2020 revealed widely patent previously placed proximal LAD stent, 100% stenosis of mid LAD, 100% stenosis of proximal RCA, patent LIMA to the LAD and complete occlusion of the saphenous vein graft to the right coronary artery with vessel to vessel collaterals noted from the proximal to distal right coronary artery as well as the obtuse marginal branch.  As saphenous vein graft to the right coronary artery was 100% occluded, no PCI was performed. He continues to complain of sharp, exertional, substernal chest pain that does not radiate. The pain is associated with shortness of breath and is relieved with rest. He reports that pain occurs with minimal exertion. Patient denies palpitations, dizziness, syncope, orthopnea, PND, edema or decreased stamina.  Patient denies any signs of bleeding.    Chief Complaint: Routine follow-up  Coronary Artery Disease  Presents for follow-up visit. Symptoms include chest pain and shortness of breath. Pertinent negatives include no chest pressure, chest tightness, dizziness, leg swelling, muscle weakness, palpitations or weight gain. Risk factors include hyperlipidemia  and obesity. The symptoms have been stable. Compliance with diet is variable. Compliance with exercise is variable. Compliance with medications is good.   Hypertension  This is a chronic problem. The current episode started more than 1 year ago. The problem is controlled. Associated symptoms include chest pain and shortness of breath. Pertinent negatives include no anxiety, blurred vision, headaches, malaise/fatigue, neck pain, orthopnea, palpitations, peripheral edema, PND or sweats. Risk factors for coronary artery disease include male gender, obesity and dyslipidemia. Current antihypertension treatment includes beta blockers and ACE inhibitors. The current treatment provides significant improvement. Hypertensive end-organ damage includes CAD/MI.   Hyperlipidemia  This is a chronic problem. The current episode started more than 1 year ago. Exacerbating diseases include obesity. Associated symptoms include chest pain and shortness of breath. Current antihyperlipidemic treatment includes statins. Risk factors for coronary artery disease include hypertension, male sex, obesity and dyslipidemia.       Previous Cardiac Testing:  Results for orders placed during the hospital encounter of 10/03/20   Adult Transthoracic Echo Complete W/ Cont if Necessary Per Protocol    Narrative · Left ventricular ejection fraction appears to be 56 - 60%. Left   ventricular systolic function is normal.  · Normal diastolic function  · No pulmonary hypertension        Results for orders placed during the hospital encounter of 10/03/20   Cardiac Catheterization/Vascular Study    Narrative · Mid LAD lesion is 100% stenosed.  · Prox RCA lesion is 100% stenosed. Vessel two-vessel collaterals noted   from proximal to distal right coronary arteryAlso collaterals noted to   obtuse marginal branch  · Origin to Prox Graft lesion is 100% stenosed.  · Mild systolic dysfunction.  · The ejection fraction was 40-50% by visual estimate.  · No mitral  valve regurgitation.  · No aortic valve stenosis.  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    Allergies   Allergen Reactions   • Demerol [Meperidine]    • Morphine And Related Itching       Current Outpatient Medications:   •  aspirin 81 MG tablet, Take 81 mg by mouth every night., Disp: , Rfl:   •  folic acid-pyridoxine-cyanocobalamin (FOLTX) 2.5-25-2 MG tablet tablet, Take 1 tablet by mouth every night., Disp: , Rfl:   •  isosorbide mononitrate (IMDUR) 30 MG 24 hr tablet, Take 1 tablet by mouth Daily., Disp: 30 tablet, Rfl: 11  •  metoprolol succinate XL (TOPROL-XL) 50 MG 24 hr tablet, Take 50 mg by mouth every night., Disp: , Rfl:   •  Multiple Vitamins-Minerals (CENTRUM CARDIO PO), Take 1 tablet by mouth Every Night., Disp: , Rfl:   •  nitroglycerin (NITROSTAT) 0.4 MG SL tablet, 1 under the tongue as needed for angina, may repeat q5mins for up three doses, Disp: 100 tablet, Rfl: 11  •  ramipril (ALTACE) 2.5 MG capsule, Take 2.5 mg by mouth every night., Disp: , Rfl:   •  rosuvastatin (CRESTOR) 10 MG tablet, Take 1 tablet by mouth Every Night., Disp: 90 tablet, Rfl: 3  •  ticagrelor (BRILINTA) 90 MG tablet tablet, Take 1 tablet by mouth Every 12 (Twelve) Hours., Disp: 180 tablet, Rfl: 3  •  Vortioxetine HBr (TRINTELLIX) 20 MG tablet, Take 20 mg by mouth Daily., Disp: , Rfl:   Past Medical History:   Diagnosis Date   • Aortocoronary bypass status    • CAD in native artery    • Chest pain    • Coronary angioplasty status    • Hyperlipidemia     unspecified   • Hypertension, benign    • Obesity    • Sleep apnea     unspecified     Past Surgical History:    Procedure Laterality Date   • ANKLE SURGERY      with screws    • CARDIAC CATHETERIZATION Left 03/05/2012 7/2016   • CARDIAC CATHETERIZATION Left 8/12/2019    Procedure: Cardiac Catheterization/Vascular Study BRIANNA OK;  Surgeon: Storm Encinas MD;  Location:  [facility];  Service: Cardiology   • CARDIAC CATHETERIZATION Left 10/4/2020    Procedure: Cardiac Catheterization/Vascular Study 6F Right groin ;  Surgeon: Storm Encinas MD;  Location:  [facility];  Service: Cardiology;  Laterality: Left;   • COLONOSCOPY  12/30/2008   • COLONOSCOPY  12/30/2013    two polyps removed from the sigmoid colon   • COLONOSCOPY N/A 6/21/2019    Procedure: COLONOSCOPY WITH ANESTHESIA;  Surgeon: Marciano Jaramillo DO;  Location:  [facility];  Service: Gastroenterology   • CORONARY ARTERY BYPASS GRAFT  1993    x2   • CORONARY STENT PLACEMENT  2019    X 3   • JOINT REPLACEMENT Left     left ankle   • SHOULDER SURGERY Right     with chano   • WRIST SURGERY Left      Family History   Problem Relation Age of Onset   • Diabetes Mother         type II   • Heart failure Mother         congestive heart failure   • Colon polyps Neg Hx    • Esophageal cancer Neg Hx    • Rectal cancer Neg Hx    • Colon cancer Neg Hx      Social History     Socioeconomic History   • Marital status:      Spouse name: Not on file   • Number of children: Not on file   • Years of education: Not on file   • Highest education level: Not on file   Tobacco Use   • Smoking status: Never Smoker   • Smokeless tobacco: Never Used   Substance and Sexual Activity   • Alcohol use: Not Currently     Comment: occasional   • Drug use: No   • Sexual activity: Defer       Review of Systems   Constitution: Negative for chills, decreased appetite, fever, malaise/fatigue, night sweats, weight gain and weight loss.   HENT: Negative for nosebleeds.    Eyes: Negative for blurred vision and visual disturbance.   Cardiovascular: Positive for chest  pain. Negative for dyspnea on exertion, leg swelling, near-syncope, orthopnea, palpitations, paroxysmal nocturnal dyspnea and syncope.   Respiratory: Positive for shortness of breath. Negative for chest tightness, cough, hemoptysis, snoring and wheezing.    Endocrine: Negative for cold intolerance and heat intolerance.   Hematologic/Lymphatic: Does not bruise/bleed easily.   Skin: Negative for rash.   Musculoskeletal: Negative for back pain, falls, muscle weakness and neck pain.   Gastrointestinal: Negative for abdominal pain, change in bowel habit, constipation, diarrhea, dysphagia, heartburn, nausea and vomiting.   Genitourinary: Negative for hematuria.   Neurological: Negative for dizziness, headaches, light-headedness and weakness.   Psychiatric/Behavioral: Negative for altered mental status.   Allergic/Immunologic: Negative for persistent infections.              Objective:     Vitals signs and nursing note reviewed.   Constitutional:       General: Awake.      Appearance: Normal and healthy appearance. Well-developed, normal weight and not in distress.   Eyes:      General: Lids are normal.      Conjunctiva/sclera: Conjunctivae normal.      Pupils: Pupils are equal, round, and reactive to light.   HENT:      Head: Normocephalic and atraumatic.      Nose: Nose normal.   Neck:      Musculoskeletal: Normal range of motion.      Vascular: No JVR. JVD normal.   Pulmonary:      Effort: Pulmonary effort is normal.      Breath sounds: Normal breath sounds. No wheezing. No rhonchi. No rales.   Chest:      Chest wall: Not tender to palpatation.   Cardiovascular:      PMI at left midclavicular line. Normal rate. Regular rhythm. Normal S1. Normal S2.      Murmurs: There is no murmur.      No gallop. No click. No rub.   Pulses:     Intact distal pulses.   Edema:     Peripheral edema absent.   Abdominal:      General: Bowel sounds are normal.      Palpations: Abdomen is soft.      Tenderness: There is no abdominal  tenderness.   Musculoskeletal: Normal range of motion.         General: No tenderness.   Skin:     General: Skin is warm and dry.   Neurological:      General: No focal deficit present.      Mental Status: Alert, oriented to person, place, and time and oriented to person, place and time.   Psychiatric:         Attention and Perception: Attention and perception normal.         Mood and Affect: Mood and affect normal.         Speech: Speech normal.         Behavior: Behavior normal. Behavior is cooperative.         Thought Content: Thought content normal.         Cognition and Memory: Cognition and memory normal.         Judgment: Judgment normal.           Procedures  There were no vitals taken for this visit.  Lab Review:   Lab Results   Component Value Date    WBC 7.10 10/05/2020    HGB 13.4 10/05/2020    HCT 38.6 10/05/2020    MCV 91.7 10/05/2020     10/05/2020     Lab Results   Component Value Date    GLUCOSE 133 (H) 10/05/2020    BUN 11 10/05/2020    CREATININE 0.77 10/05/2020    EGFRIFNONA 102 10/05/2020    BCR 14.3 10/05/2020    K 3.8 10/05/2020    CO2 24.0 10/05/2020    CALCIUM 8.9 10/05/2020    ALBUMIN 4.50 10/03/2020    AST 30 10/03/2020    ALT 25 10/03/2020     Lab Results   Component Value Date    CHOL 119 10/04/2020    CHOL 129 (L) 08/13/2019    CHLPL 143 07/07/2016     Lab Results   Component Value Date    TRIG 192 (H) 10/04/2020    TRIG 180 (H) 08/13/2019    TRIG 180 (H) 07/07/2016     Lab Results   Component Value Date    HDL 28 (L) 10/04/2020    HDL 27 (L) 08/13/2019    HDL 31 07/07/2016     Lab Results   Component Value Date    LDL 53 10/04/2020    LDL 87 08/13/2019    LDL 93 07/07/2016       I have reviewed the most recent lab results.       Assessment:          Diagnosis Plan   1. Coronary artery disease of bypass graft of native heart with stable angina pectoris (CMS/Formerly [facility])  Increase Imdur to 60 mg daily.    2. S/P CABG x 2  LIMA to LAD and SVG to RCA (occluded) 1993   3. Stented coronary  artery   Continues on aspirin and Brilinta. Denies bleeding.    4. Presence of coronary artery bypass graft stent  SVG to RCA X3 most recent 8/12/19   5. Essential hypertension  Well controlled.    6. Mixed hyperlipidemia  Managed by PCP. On statin. Well controlled.    7. Obstructive sleep apnea on CPAP  Compliant.    8. Class 1 obesity due to excess calories with serious comorbidity and body mass index (BMI) of 34.0 to 34.9 in adult  Patient's There is no height or weight on file to calculate BMI. BMI is above normal parameters. Recommendations include: educational material.            Plan:         1. Increase Imdur to 60 mg daily. Continue other medications as previously prescribed.  2. Report any worsening symptoms.  3. Report any signs of bleeding.  4. Continue heart healthy diet and regular exercise as tolerated.   5. Follow up with PCP for blood pressure and cholesterol management and routine lab work.  6. Follow up in two weeks, or sooner if needed. Consider up titration of Imdur/initiation of Ranexa at that time, if symptoms persist.

## 2020-11-10 ENCOUNTER — OFFICE VISIT (OUTPATIENT)
Dept: CARDIOLOGY | Facility: CLINIC | Age: 65
End: 2020-11-10

## 2020-11-10 VITALS
BODY MASS INDEX: 34.81 KG/M2 | HEIGHT: 72 IN | HEART RATE: 59 BPM | OXYGEN SATURATION: 97 % | SYSTOLIC BLOOD PRESSURE: 110 MMHG | DIASTOLIC BLOOD PRESSURE: 64 MMHG | WEIGHT: 257 LBS

## 2020-11-10 DIAGNOSIS — E66.09 CLASS 1 OBESITY DUE TO EXCESS CALORIES WITH SERIOUS COMORBIDITY AND BODY MASS INDEX (BMI) OF 34.0 TO 34.9 IN ADULT: ICD-10-CM

## 2020-11-10 DIAGNOSIS — G47.33 OBSTRUCTIVE SLEEP APNEA ON CPAP: Chronic | ICD-10-CM

## 2020-11-10 DIAGNOSIS — Z95.5 STENTED CORONARY ARTERY: ICD-10-CM

## 2020-11-10 DIAGNOSIS — Z95.5 PRESENCE OF CORONARY ARTERY BYPASS GRAFT STENT: ICD-10-CM

## 2020-11-10 DIAGNOSIS — Z95.1 S/P CABG X 2: ICD-10-CM

## 2020-11-10 DIAGNOSIS — Z95.1 PRESENCE OF CORONARY ARTERY BYPASS GRAFT STENT: ICD-10-CM

## 2020-11-10 DIAGNOSIS — E78.2 MIXED HYPERLIPIDEMIA: Chronic | ICD-10-CM

## 2020-11-10 DIAGNOSIS — I25.708 CORONARY ARTERY DISEASE OF BYPASS GRAFT OF NATIVE HEART WITH STABLE ANGINA PECTORIS (HCC): Primary | ICD-10-CM

## 2020-11-10 DIAGNOSIS — I10 ESSENTIAL HYPERTENSION: Chronic | ICD-10-CM

## 2020-11-10 DIAGNOSIS — Z99.89 OBSTRUCTIVE SLEEP APNEA ON CPAP: Chronic | ICD-10-CM

## 2020-11-10 PROCEDURE — 99214 OFFICE O/P EST MOD 30 MIN: CPT | Performed by: NURSE PRACTITIONER

## 2020-11-10 RX ORDER — ISOSORBIDE MONONITRATE 60 MG/1
60 TABLET, EXTENDED RELEASE ORAL DAILY
Qty: 30 TABLET | Refills: 11 | Status: SHIPPED | OUTPATIENT
Start: 2020-11-10 | End: 2020-11-23 | Stop reason: SDUPTHER

## 2020-11-19 NOTE — PATIENT INSTRUCTIONS
Obesity, Adult  Obesity is the condition of having too much total body fat. Being overweight or obese means that your weight is greater than what is considered healthy for your body size. Obesity is determined by a measurement called BMI. BMI is an estimate of body fat and is calculated from height and weight. For adults, a BMI of 30 or higher is considered obese.  Obesity can lead to other health concerns and major illnesses, including:  · Stroke.  · Coronary artery disease (CAD).  · Type 2 diabetes.  · Some types of cancer, including cancers of the colon, breast, uterus, and gallbladder.  · Osteoarthritis.  · High blood pressure (hypertension).  · High cholesterol.  · Sleep apnea.  · Gallbladder stones.  · Infertility problems.  What are the causes?  Common causes of this condition include:  · Eating daily meals that are high in calories, sugar, and fat.  · Being born with genes that may make you more likely to become obese.  · Having a medical condition that causes obesity, including:  ? Hypothyroidism.  ? Polycystic ovarian syndrome (PCOS).  ? Binge-eating disorder.  ? Cushing syndrome.  · Taking certain medicines, such as steroids, antidepressants, and seizure medicines.  · Not being physically active (sedentary lifestyle).  · Not getting enough sleep.  · Drinking high amounts of sugar-sweetened beverages, such as soft drinks.  What increases the risk?  The following factors may make you more likely to develop this condition:  · Having a family history of obesity.  · Being a woman of  descent.  · Being a man of  descent.  · Living in an area with limited access to:  ? [place], recreation centers, or sidewalks.  ? Healthy food choices, such as grocery stores and farmers' markets.  What are the signs or symptoms?  The main sign of this condition is having too much body fat.  How is this diagnosed?  This condition is diagnosed based on:  · Your BMI. If you are an adult with a BMI of 30 or  higher, you are considered obese.  · Your waist circumference. This measures the distance around your waistline.  · Your skinfold thickness. Your health care provider may gently pinch a fold of your skin and measure it.  You may have other tests to check for underlying conditions.  How is this treated?  Treatment for this condition often includes changing your lifestyle. Treatment may include some or all of the following:  · Dietary changes. This may include developing a healthy meal plan.  · Regular physical activity. This may include activity that causes your heart to beat faster (aerobic exercise) and strength training. Work with your health care provider to design an exercise program that works for you.  · Medicine to help you lose weight if you are unable to lose 1 pound a week after 6 weeks of healthy eating and more physical activity.  · Treating conditions that cause the obesity (underlying conditions).  · Surgery. Surgical options may include gastric banding and gastric bypass. Surgery may be done if:  ? Other treatments have not helped to improve your condition.  ? You have a BMI of 40 or higher.  ? You have life-threatening health problems related to obesity.  Follow these instructions at home:  Eating and drinking    · Follow recommendations from your health care provider about what you eat and drink. Your health care provider may advise you to:  ? Limit fast food, sweets, and processed snack foods.  ? Choose low-fat options, such as low-fat milk instead of whole milk.  ? Eat 5 or more servings of fruits or vegetables every day.  ? Eat at home more often. This gives you more control over what you eat.  ? Choose healthy foods when you eat out.  ? Learn to read food labels. This will help you understand how much food is considered 1 serving.  ? Learn what a healthy serving size is.  ? Keep low-fat snacks available.  ? Limit sugary drinks, such as soda, fruit juice, sweetened iced tea, and flavored  milk.  · Drink enough water to keep your urine pale yellow.  · Do not follow a fad diet. Fad diets can be unhealthy and even dangerous.  Physical activity  · Exercise regularly, as told by your health care provider.  ? Most adults should get up to 150 minutes of moderate-intensity exercise every week.  ? Ask your health care provider what types of exercise are safe for you and how often you should exercise.  · Warm up and stretch before being active.  · Cool down and stretch after being active.  · Rest between periods of activity.  Lifestyle  · Work with your health care provider and a dietitian to set a weight-loss goal that is healthy and reasonable for you.  · Limit your screen time.  · Find ways to reward yourself that do not involve food.  · Do not drink alcohol if:  ? Your health care provider tells you not to drink.  ? You are pregnant, may be pregnant, or are planning to become pregnant.  · If you drink alcohol:  ? Limit how much you use to:  § 0-1 drink a day for women.  § 0-2 drinks a day for men.  ? Be aware of how much alcohol is in your drink. In the [place] one drink equals one 12 oz bottle of beer (355 mL), one 5 oz glass of wine (148 mL), or one 1½ oz glass of hard liquor (44 mL).  General instructions  · Keep a weight-loss journal to keep track of the food you eat and how much exercise you get.  · Take over-the-counter and prescription medicines only as told by your health care provider.  · Take vitamins and supplements only as told by your health care provider.  · Consider joining a support group. Your health care provider may be able to recommend a support group.  · Keep all follow-up visits as told by your health care provider. This is important.  Contact a health care provider if:  · You are unable to meet your weight loss goal after 6 weeks of dietary and lifestyle changes.  Get help right away if you are having:  · Trouble breathing.  · Suicidal thoughts or behaviors.  Summary  · Obesity is the  condition of having too much total body fat.  · Being overweight or obese means that your weight is greater than what is considered healthy for your body size.  · Work with your health care provider and a dietitian to set a weight-loss goal that is healthy and reasonable for you.  · Exercise regularly, as told by your health care provider. Ask your health care provider what types of exercise are safe for you and how often you should exercise.  This information is not intended to replace advice given to you by your health care provider. Make sure you discuss any questions you have with your health care provider.  Document Released: 01/25/2006 Document Revised: 08/22/2019 Document Reviewed: 08/22/2019  [employer] Patient Education © 2020 [employer]    Heart-Healthy Eating Plan  Heart-healthy meal planning includes:  · Eating less unhealthy fats.  · Eating more healthy fats.  · Making other changes in your diet.  Talk with your doctor or a diet specialist (dietitian) to create an eating plan that is right for you.  What is my plan?  Your doctor may recommend an eating plan that includes:  · Total fat: ______% or less of total calories a day.  · Saturated fat: ______% or less of total calories a day.  · Cholesterol: less than _________mg a day.  What are tips for following this plan?  Cooking  Avoid frying your food. Try to bake, boil, grill, or broil it instead. You can also reduce fat by:  · Removing the skin from poultry.  · Removing all visible fats from meats.  · Steaming vegetables in water or broth.  Meal planning    · At meals, divide your plate into four equal parts:  ? Fill one-half of your plate with vegetables and green salads.  ? Fill one-fourth of your plate with whole grains.  ? Fill one-fourth of your plate with lean protein foods.  · Eat 4-5 servings of vegetables per day. A serving of vegetables is:  ? 1 cup of raw or cooked vegetables.  ? 2 cups of raw leafy greens.  · Eat 4-5 servings of fruit per  day. A serving of fruit is:  ? 1 medium whole fruit.  ? ¼ cup of dried fruit.  ? ½ cup of fresh, frozen, or canned fruit.  ? ½ cup of 100% fruit juice.  · Eat more foods that have soluble fiber. These are apples, broccoli, carrots, beans, peas, and barley. Try to get 20-30 g of fiber per day.  · Eat 4-5 servings of nuts, legumes, and seeds per week:  ? 1 serving of dried beans or legumes equals ½ cup after being cooked.  ? 1 serving of nuts is ¼ cup.  ? 1 serving of seeds equals 1 tablespoon.  General information  · Eat more home-cooked food. Eat less restaurant, buffet, and fast food.  · Limit or avoid alcohol.  · Limit foods that are high in starch and sugar.  · Avoid fried foods.  · Lose weight if you are overweight.  · Keep track of how much salt (sodium) you eat. This is important if you have high blood pressure. Ask your doctor to tell you more about this.  · Try to add vegetarian meals each week.  Fats  · Choose healthy fats. These include olive oil and canola oil, flaxseeds, walnuts, almonds, and seeds.  · Eat more omega-3 fats. These include salmon, mackerel, sardines, tuna, flaxseed oil, and ground flaxseeds. Try to eat fish at least 2 times each week.  · Check food labels. Avoid foods with trans fats or high amounts of saturated fat.  · Limit saturated fats.  ? These are often found in animal products, such as meats, butter, and cream.  ? These are also found in plant foods, such as palm oil, palm kernel oil, and coconut oil.  · Avoid foods with partially hydrogenated oils in them. These have trans fats. Examples are stick margarine, some tub margarines, cookies, crackers, and other baked goods.  What foods can I eat?  Fruits  All fresh, canned (in natural juice), or frozen fruits.  Vegetables  Fresh or frozen vegetables (raw, steamed, roasted, or grilled). Green salads.  Grains  Most grains. Choose whole wheat and whole grains most of the time. Rice and pasta, including brown rice and pastas made with  whole wheat.  Meats and other proteins  Lean, well-trimmed beef, veal, pork, and lamb. Chicken and turkey without skin. All fish and shellfish. Wild duck, rabbit, pheasant, and venison. Egg whites or low-cholesterol egg substitutes. Dried beans, peas, lentils, and tofu. Seeds and most nuts.  Dairy  Low-fat or nonfat cheeses, including ricotta and mozzarella. Skim or 1% milk that is liquid, powdered, or evaporated. Buttermilk that is made with low-fat milk. Nonfat or low-fat yogurt.  Fats and oils  Non-hydrogenated (trans-free) margarines. Vegetable oils, including soybean, sesame, sunflower, olive, peanut, safflower, corn, canola, and cottonseed. Salad dressings or mayonnaise made with a vegetable oil.  Beverages  Mineral water. Coffee and tea. Diet carbonated beverages.  Sweets and desserts  Sherbet, gelatin, and fruit ice. Small amounts of dark chocolate.  Limit all sweets and desserts.  Seasonings and condiments  All seasonings and condiments.  The items listed above may not be a complete list of foods and drinks you can eat. Contact a dietitian for more options.  What foods should I avoid?  Fruits  Canned fruit in heavy syrup. Fruit in cream or butter sauce. Fried fruit. Limit coconut.  Vegetables  Vegetables cooked in cheese, cream, or butter sauce. Fried vegetables.  Grains  Breads that are made with saturated or trans fats, oils, or whole milk. Croissants. Sweet rolls. Donuts. High-fat crackers, such as cheese crackers.  Meats and other proteins  Fatty meats, such as hot dogs, ribs, sausage, jones, rib-eye roast or steak. High-fat deli meats, such as salami and bologna. Caviar. Domestic duck and goose. Organ meats, such as liver.  Dairy  Cream, sour cream, cream cheese, and creamed cottage cheese. Whole-milk cheeses. Whole or 2% milk that is liquid, evaporated, or condensed. Whole buttermilk. Cream sauce or high-fat cheese sauce. Yogurt that is made from whole milk.  Fats and oils  Meat fat, or shortening.  Cocoa butter, hydrogenated oils, palm oil, coconut oil, palm kernel oil. Solid fats and shortenings, including jones fat, salt pork, lard, and butter. Nondairy cream substitutes. Salad dressings with cheese or sour cream.  Beverages  Regular sodas and juice drinks with added sugar.  Sweets and desserts  Frosting. Pudding. Cookies. Cakes. Pies. Milk chocolate or white chocolate. Buttered syrups. Full-fat ice cream or ice cream drinks.  The items listed above may not be a complete list of foods and drinks to avoid. Contact a dietitian for more information.  Summary  · Heart-healthy meal planning includes eating less unhealthy fats, eating more healthy fats, and making other changes in your diet.  · Eat a balanced diet. This includes fruits and vegetables, low-fat or nonfat dairy, lean protein, nuts and legumes, whole grains, and heart-healthy oils and fats.  This information is not intended to replace advice given to you by your health care provider. Make sure you discuss any questions you have with your health care provider.  Document Released: 06/18/2013 Document Revised: 02/21/2019 Document Reviewed: 01/25/2019  [employer] Patient Education © 2020 [employer]    Exercising to Lose Weight  Exercise is structured, repetitive physical activity to improve fitness and health. Getting regular exercise is important for everyone. It is especially important if you are overweight. Being overweight increases your risk of heart disease, stroke, diabetes, high blood pressure, and several types of cancer. Reducing your calorie intake and exercising can help you lose weight.  Exercise is usually categorized as moderate or vigorous intensity. To lose weight, most people need to do a certain amount of moderate-intensity or vigorous-intensity exercise each week.  Moderate-intensity exercise    Moderate-intensity exercise is any activity that gets you moving enough to burn at least three times more energy (calories) than if you were  sitting.  Examples of moderate exercise include:  · Walking a mile in 15 minutes.  · Doing light yard work.  · Biking at an easy pace.  Most people should get at least 150 minutes (2 hours and 30 minutes) a week of moderate-intensity exercise to maintain their body weight.  Vigorous-intensity exercise  Vigorous-intensity exercise is any activity that gets you moving enough to burn at least six times more calories than if you were sitting. When you exercise at this intensity, you should be working hard enough that you are not able to carry on a conversation.  Examples of vigorous exercise include:  · Running.  · Playing a team sport, such as football, basketball, and soccer.  · Jumping rope.  Most people should get at least 75 minutes (1 hour and 15 minutes) a week of vigorous-intensity exercise to maintain their body weight.  How can exercise affect me?  When you exercise enough to burn more calories than you eat, you lose weight. Exercise also reduces body fat and builds muscle. The more muscle you have, the more calories you burn. Exercise also:  · Improves mood.  · Reduces stress and tension.  · Improves your overall fitness, flexibility, and endurance.  · Increases bone strength.  The amount of exercise you need to lose weight depends on:  · Your age.  · The type of exercise.  · Any health conditions you have.  · Your overall physical ability.  Talk to your health care provider about how much exercise you need and what types of activities are safe for you.  What actions can I take to lose weight?  Nutrition    · Make changes to your diet as told by your health care provider or diet and nutrition specialist (dietitian). This may include:  ? Eating fewer calories.  ? Eating more protein.  ? Eating less unhealthy fats.  ? Eating a diet that includes fresh fruits and vegetables, whole grains, low-fat dairy products, and lean protein.  ? Avoiding foods with added fat, salt, and sugar.  · Drink plenty of water while  you exercise to prevent dehydration or heat stroke.  Activity  · Choose an activity that you enjoy and set realistic goals. Your health care provider can help you make an exercise plan that works for you.  · Exercise at a moderate or vigorous intensity most days of the week.  ? The intensity of exercise may vary from person to person. You can tell how intense a workout is for you by paying attention to your breathing and heartbeat. Most people will notice their breathing and heartbeat get faster with more intense exercise.  · Do resistance training twice each week, such as:  ? Push-ups.  ? Sit-ups.  ? Lifting weights.  ? Using resistance bands.  · Getting short amounts of exercise can be just as helpful as long structured periods of exercise. If you have trouble finding time to exercise, try to include exercise in your daily routine.  ? Get up, stretch, and walk around every 30 minutes throughout the day.  ? Go for a walk during your lunch break.  ? Park your car farther away from your destination.  ? If you take public transportation, get off one stop early and walk the rest of the way.  ? Make phone calls while standing up and walking around.  ? Take the stairs instead of elevators or escalators.  · Wear comfortable clothes and shoes with good support.  · Do not exercise so much that you hurt yourself, feel dizzy, or get very short of breath.  Where to find more information  · [place] Department of Health and Human Services: www.hhs.gov  · Centers for Disease Control and Prevention (CDC): www.cdc.gov  Contact a health care provider:  · Before starting a new exercise program.  · If you have questions or concerns about your weight.  · If you have a medical problem that keeps you from exercising.  Get help right away if you have any of the following while exercising:  · Injury.  · Dizziness.  · Difficulty breathing or shortness of breath that does not go away when you stop exercising.  · Chest pain.  · Rapid  As certified below, I, or a nurse practitioner or physician assistant working with me, had a face-to-face encounter that meets the physician face-to-face encounter requirements. heartbeat.  Summary  · Being overweight increases your risk of heart disease, stroke, diabetes, high blood pressure, and several types of cancer.  · Losing weight happens when you burn more calories than you eat.  · Reducing the amount of calories you eat in addition to getting regular moderate or vigorous exercise each week helps you lose weight.  This information is not intended to replace advice given to you by your health care provider. Make sure you discuss any questions you have with your health care provider.  Document Released: 01/20/2012 Document Revised: 12/31/2018 Document Reviewed: 12/31/2018  [employer] Patient Education © 2020 [employer]

## 2020-11-23 NOTE — PROGRESS NOTES
Subjective:     Encounter Date:11/24/2020      Patient ID: Lio Velasquez is a 64 y.o. male   HPI: This patient presents today for routine follow-up of medication adjustments.  Imdur was increased to 60 mg daily at his last office visit on 11/10/2020.  He reports decrease in severity and frequency of chest pain since that time. He has a history of coronary artery disease status post CABG x2 (LIMA to LAD and SVG to RCA) with subsequent 3.25 x 38 mm stent to the saphenous vein graft to the right coronary artery on 7/8/2016 and 3.5 x 38 mm Jamee drug-eluting stent to the mid saphenous vein graft to the right coronary artery and 3.5 x 23 mm Jamee drug-eluting stent to the distal saphenous vein graft to the right coronary artery 8/12/2019, hypertension, hyperlipidemia, obstructive sleep apnea and obesity.Left heart catheterization on 10/4/2020 revealed widely patent previously placed proximal LAD stent, 100% stenosis of mid LAD, 100% stenosis of proximal RCA, patent LIMA to the LAD and complete occlusion of the saphenous vein graft to the right coronary artery with vessel to vessel collaterals noted from the proximal to distal right coronary artery as well as the obtuse marginal branch.  As saphenous vein graft to the right coronary artery was 100% occluded, no PCI was performed. He continues to complain of sharp, exertional, substernal chest pain that does not radiate. The pain is associated with shortness of breath and is relieved with rest. He reports that pain occurs with minimal exertion. Patient denies palpitations, dizziness, syncope, orthopnea, PND, edema or decreased stamina.  Patient denies any signs of bleeding.    Chief Complaint: Routine follow-up  Coronary Artery Disease  Presents for follow-up visit. Symptoms include chest pain and shortness of breath. Pertinent negatives include no chest pressure, chest tightness, dizziness, leg swelling, muscle weakness, palpitations or weight gain. Risk factors include  hyperlipidemia and obesity. The symptoms have been stable. Compliance with diet is variable. Compliance with exercise is variable. Compliance with medications is good.   Hypertension  This is a chronic problem. The current episode started more than 1 year ago. The problem is controlled. Associated symptoms include chest pain and shortness of breath. Pertinent negatives include no anxiety, blurred vision, headaches, malaise/fatigue, neck pain, orthopnea, palpitations, peripheral edema, PND or sweats. Risk factors for coronary artery disease include male gender, obesity and dyslipidemia. Current antihypertension treatment includes beta blockers and ACE inhibitors. The current treatment provides significant improvement. Hypertensive end-organ damage includes CAD/MI.   Hyperlipidemia  This is a chronic problem. The current episode started more than 1 year ago. Exacerbating diseases include obesity. Associated symptoms include chest pain and shortness of breath. Current antihyperlipidemic treatment includes statins. Risk factors for coronary artery disease include hypertension, male sex, obesity and dyslipidemia.       Previous Cardiac Testing:  Results for orders placed during the hospital encounter of 10/03/20   Adult Transthoracic Echo Complete W/ Cont if Necessary Per Protocol    Narrative · Left ventricular ejection fraction appears to be 56 - 60%. Left   ventricular systolic function is normal.  · Normal diastolic function  · No pulmonary hypertension        Results for orders placed during the hospital encounter of 10/03/20   Cardiac Catheterization/Vascular Study    Narrative · Mid LAD lesion is 100% stenosed.  · Prox RCA lesion is 100% stenosed. Vessel two-vessel collaterals noted   from proximal to distal right coronary arteryAlso collaterals noted to   obtuse marginal branch  · Origin to Prox Graft lesion is 100% stenosed.  · Mild systolic dysfunction.  · The ejection fraction was 40-50% by visual estimate.  ·  No mitral valve regurgitation.  · No aortic valve stenosis.  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    Allergies   Allergen Reactions   • Demerol [Meperidine]    • Morphine And Related Itching       Current Outpatient Medications:   •  aspirin 81 MG tablet, Take 81 mg by mouth every night., Disp: , Rfl:   •  folic acid-pyridoxine-cyanocobalamin (FOLTX) 2.5-25-2 MG tablet tablet, Take 1 tablet by mouth every night., Disp: , Rfl:   •  isosorbide mononitrate (IMDUR) 60 MG 24 hr tablet, Take 1 tablet by mouth Daily., Disp: 30 tablet, Rfl: 11  •  metoprolol succinate XL (TOPROL-XL) 50 MG 24 hr tablet, Take 50 mg by mouth every night., Disp: , Rfl:   •  Multiple Vitamins-Minerals (CENTRUM CARDIO PO), Take 1 tablet by mouth Every Night., Disp: , Rfl:   •  nitroglycerin (NITROSTAT) 0.4 MG SL tablet, 1 under the tongue as needed for angina, may repeat q5mins for up three doses, Disp: 100 tablet, Rfl: 11  •  ramipril (ALTACE) 2.5 MG capsule, Take 2.5 mg by mouth every night., Disp: , Rfl:   •  rosuvastatin (CRESTOR) 10 MG tablet, Take 1 tablet by mouth Every Night., Disp: 90 tablet, Rfl: 3  •  ticagrelor (BRILINTA) 90 MG tablet tablet, Take 1 tablet by mouth Every 12 (Twelve) Hours., Disp: 180 tablet, Rfl: 3  •  Vortioxetine HBr (TRINTELLIX) 20 MG tablet, Take 20 mg by mouth Daily., Disp: , Rfl:   Past Medical History:   Diagnosis Date   • Aortocoronary bypass status    • CAD in native artery    • Chest pain    • Coronary angioplasty status    • Hyperlipidemia     unspecified   • Hypertension, benign    • Obesity    • Sleep apnea     unspecified     Past Surgical  History:   Procedure Laterality Date   • ANKLE SURGERY      with screws    • CARDIAC CATHETERIZATION Left 03/05/2012 7/2016   • CARDIAC CATHETERIZATION Left 8/12/2019    Procedure: Cardiac Catheterization/Vascular Study BRIANNA OK;  Surgeon: Storm Encinas MD;  Location:  [facility];  Service: Cardiology   • CARDIAC CATHETERIZATION Left 10/4/2020    Procedure: Cardiac Catheterization/Vascular Study 6F Right groin ;  Surgeon: Storm Encinas MD;  Location:  [facility];  Service: Cardiology;  Laterality: Left;   • COLONOSCOPY  12/30/2008   • COLONOSCOPY  12/30/2013    two polyps removed from the sigmoid colon   • COLONOSCOPY N/A 6/21/2019    Procedure: COLONOSCOPY WITH ANESTHESIA;  Surgeon: Marciano Jaramillo DO;  Location:  [facility];  Service: Gastroenterology   • CORONARY ARTERY BYPASS GRAFT  1993    x2   • CORONARY STENT PLACEMENT  2019    X 3   • JOINT REPLACEMENT Left     left ankle   • SHOULDER SURGERY Right     with chano   • WRIST SURGERY Left      Family History   Problem Relation Age of Onset   • Diabetes Mother         type II   • Heart failure Mother         congestive heart failure   • Colon polyps Neg Hx    • Esophageal cancer Neg Hx    • Rectal cancer Neg Hx    • Colon cancer Neg Hx      Social History     Socioeconomic History   • Marital status:      Spouse name: Not on file   • Number of children: Not on file   • Years of education: Not on file   • Highest education level: Not on file   Tobacco Use   • Smoking status: Never Smoker   • Smokeless tobacco: Never Used   Substance and Sexual Activity   • Alcohol use: Not Currently     Comment: occasional   • Drug use: No   • Sexual activity: Defer       Review of Systems   Constitution: Negative for chills, decreased appetite, fever, malaise/fatigue, night sweats, weight gain and weight loss.   HENT: Negative for nosebleeds.    Eyes: Negative for blurred vision and visual disturbance.   Cardiovascular: Positive  for chest pain. Negative for dyspnea on exertion, leg swelling, near-syncope, orthopnea, palpitations, paroxysmal nocturnal dyspnea and syncope.   Respiratory: Positive for shortness of breath. Negative for chest tightness, cough, hemoptysis, snoring and wheezing.    Endocrine: Negative for cold intolerance and heat intolerance.   Hematologic/Lymphatic: Does not bruise/bleed easily.   Skin: Negative for rash.   Musculoskeletal: Negative for back pain, falls, muscle weakness and neck pain.   Gastrointestinal: Negative for abdominal pain, change in bowel habit, constipation, diarrhea, dysphagia, heartburn, nausea and vomiting.   Genitourinary: Negative for hematuria.   Neurological: Negative for dizziness, headaches, light-headedness and weakness.   Psychiatric/Behavioral: Negative for altered mental status.   Allergic/Immunologic: Negative for persistent infections.              Objective:     Vitals signs and nursing note reviewed.   Constitutional:       General: Awake.      Appearance: Normal and healthy appearance. Well-developed, normal weight and not in distress.   Eyes:      General: Lids are normal.      Conjunctiva/sclera: Conjunctivae normal.      Pupils: Pupils are equal, round, and reactive to light.   HENT:      Head: Normocephalic and atraumatic.      Nose: Nose normal.   Neck:      Musculoskeletal: Normal range of motion.      Vascular: No JVR. JVD normal.   Pulmonary:      Effort: Pulmonary effort is normal.      Breath sounds: Normal breath sounds. No wheezing. No rhonchi. No rales.   Chest:      Chest wall: Not tender to palpatation.   Cardiovascular:      PMI at left midclavicular line. Normal rate. Regular rhythm. Normal S1. Normal S2.      Murmurs: There is no murmur.      No gallop. No click. No rub.   Pulses:     Intact distal pulses.   Edema:     Peripheral edema absent.   Abdominal:      General: Bowel sounds are normal.      Palpations: Abdomen is soft.      Tenderness: There is no abdominal  "tenderness.   Musculoskeletal: Normal range of motion.         General: No tenderness.   Skin:     General: Skin is warm and dry.   Neurological:      General: No focal deficit present.      Mental Status: Alert, oriented to person, place, and time and oriented to person, place and time.   Psychiatric:         Attention and Perception: Attention and perception normal.         Mood and Affect: Mood and affect normal.         Speech: Speech normal.         Behavior: Behavior normal. Behavior is cooperative.         Thought Content: Thought content normal.         Cognition and Memory: Cognition and memory normal.         Judgment: Judgment normal.           Procedures  /68   Pulse 56   Ht 182.9 cm (72\")   Wt 116 kg (255 lb)   SpO2 97%   BMI 34.58 kg/m²   Lab Review:   Lab Results   Component Value Date    WBC 7.10 10/05/2020    HGB 13.4 10/05/2020    HCT 38.6 10/05/2020    MCV 91.7 10/05/2020     10/05/2020     Lab Results   Component Value Date    GLUCOSE 133 (H) 10/05/2020    BUN 11 10/05/2020    CREATININE 0.77 10/05/2020    EGFRIFNONA 102 10/05/2020    BCR 14.3 10/05/2020    K 3.8 10/05/2020    CO2 24.0 10/05/2020    CALCIUM 8.9 10/05/2020    ALBUMIN 4.50 10/03/2020    AST 30 10/03/2020    ALT 25 10/03/2020     Lab Results   Component Value Date    CHOL 119 10/04/2020    CHOL 129 (L) 08/13/2019    CHLPL 143 07/07/2016     Lab Results   Component Value Date    TRIG 192 (H) 10/04/2020    TRIG 180 (H) 08/13/2019    TRIG 180 (H) 07/07/2016     Lab Results   Component Value Date    HDL 28 (L) 10/04/2020    HDL 27 (L) 08/13/2019    HDL 31 07/07/2016     Lab Results   Component Value Date    LDL 53 10/04/2020    LDL 87 08/13/2019    LDL 93 07/07/2016       I have reviewed the most recent lab results.       Assessment:          Diagnosis Plan   1. Coronary artery disease of bypass graft of native heart with stable angina pectoris (CMS/HCC)  Increase Imdur to 120 mg daily.    2. S/P CABG x 2  LIMA to LAD " and SVG to RCA (occluded) 1993   3. Stented coronary artery   Continues on aspirin and Brilinta. Denies bleeding.    4. Presence of coronary artery bypass graft stent  SVG to RCA X3 most recent 8/12/19   5. Essential hypertension  Well controlled.    6. Mixed hyperlipidemia  Managed by PCP. On statin. Well controlled.    7. Obstructive sleep apnea on CPAP  Compliant.    8. Class 1 obesity due to excess calories with serious comorbidity and body mass index (BMI) of 34.0 to 34.9 in adult  Patient's Body mass index is 34.58 kg/m². BMI is above normal parameters. Recommendations include: educational material.            Plan:         1. Increase Imdur to 120 mg daily. Continue other medications as previously prescribed.  2. Report any worsening symptoms.  3. Report any signs of bleeding.  4. Continue heart healthy diet and regular exercise as tolerated.   5. Follow up with PCP for blood pressure and cholesterol management and routine lab work.  6. Follow up in two weeks, or sooner if needed. Consider initiation of Ranexa at that time, if symptoms persist.

## 2020-11-23 NOTE — PATIENT INSTRUCTIONS
Exercising to Lose Weight  Exercise is structured, repetitive physical activity to improve fitness and health. Getting regular exercise is important for everyone. It is especially important if you are overweight. Being overweight increases your risk of heart disease, stroke, diabetes, high blood pressure, and several types of cancer. Reducing your calorie intake and exercising can help you lose weight.  Exercise is usually categorized as moderate or vigorous intensity. To lose weight, most people need to do a certain amount of moderate-intensity or vigorous-intensity exercise each week.  Moderate-intensity exercise    Moderate-intensity exercise is any activity that gets you moving enough to burn at least three times more energy (calories) than if you were sitting.  Examples of moderate exercise include:  · Walking a mile in 15 minutes.  · Doing light yard work.  · Biking at an easy pace.  Most people should get at least 150 minutes (2 hours and 30 minutes) a week of moderate-intensity exercise to maintain their body weight.  Vigorous-intensity exercise  Vigorous-intensity exercise is any activity that gets you moving enough to burn at least six times more calories than if you were sitting. When you exercise at this intensity, you should be working hard enough that you are not able to carry on a conversation.  Examples of vigorous exercise include:  · Running.  · Playing a team sport, such as football, basketball, and soccer.  · Jumping rope.  Most people should get at least 75 minutes (1 hour and 15 minutes) a week of vigorous-intensity exercise to maintain their body weight.  How can exercise affect me?  When you exercise enough to burn more calories than you eat, you lose weight. Exercise also reduces body fat and builds muscle. The more muscle you have, the more calories you burn. Exercise also:  · Improves mood.  · Reduces stress and tension.  · Improves your overall fitness, flexibility, and  endurance.  · Increases bone strength.  The amount of exercise you need to lose weight depends on:  · Your age.  · The type of exercise.  · Any health conditions you have.  · Your overall physical ability.  Talk to your health care provider about how much exercise you need and what types of activities are safe for you.  What actions can I take to lose weight?  Nutrition    · Make changes to your diet as told by your health care provider or diet and nutrition specialist (dietitian). This may include:  ? Eating fewer calories.  ? Eating more protein.  ? Eating less unhealthy fats.  ? Eating a diet that includes fresh fruits and vegetables, whole grains, low-fat dairy products, and lean protein.  ? Avoiding foods with added fat, salt, and sugar.  · Drink plenty of water while you exercise to prevent dehydration or heat stroke.  Activity  · Choose an activity that you enjoy and set realistic goals. Your health care provider can help you make an exercise plan that works for you.  · Exercise at a moderate or vigorous intensity most days of the week.  ? The intensity of exercise may vary from person to person. You can tell how intense a workout is for you by paying attention to your breathing and heartbeat. Most people will notice their breathing and heartbeat get faster with more intense exercise.  · Do resistance training twice each week, such as:  ? Push-ups.  ? Sit-ups.  ? Lifting weights.  ? Using resistance bands.  · Getting short amounts of exercise can be just as helpful as long structured periods of exercise. If you have trouble finding time to exercise, try to include exercise in your daily routine.  ? Get up, stretch, and walk around every 30 minutes throughout the day.  ? Go for a walk during your lunch break.  ? Park your car farther away from your destination.  ? If you take public transportation, get off one stop early and walk the rest of the way.  ? Make phone calls while standing up and walking  around.  ? Take the stairs instead of elevators or escalators.  · Wear comfortable clothes and shoes with good support.  · Do not exercise so much that you hurt yourself, feel dizzy, or get very short of breath.  Where to find more information  · [place] Department of Health and Human Services: www.hhs.gov  · Centers for Disease Control and Prevention (CDC): www.cdc.gov  Contact a health care provider:  · Before starting a new exercise program.  · If you have questions or concerns about your weight.  · If you have a medical problem that keeps you from exercising.  Get help right away if you have any of the following while exercising:  · Injury.  · Dizziness.  · Difficulty breathing or shortness of breath that does not go away when you stop exercising.  · Chest pain.  · Rapid heartbeat.  Summary  · Being overweight increases your risk of heart disease, stroke, diabetes, high blood pressure, and several types of cancer.  · Losing weight happens when you burn more calories than you eat.  · Reducing the amount of calories you eat in addition to getting regular moderate or vigorous exercise each week helps you lose weight.  This information is not intended to replace advice given to you by your health care provider. Make sure you discuss any questions you have with your health care provider.  Document Released: 01/20/2012 Document Revised: 12/31/2018 Document Reviewed: 12/31/2018  [employer] Patient Education © 2020 [employer]  Heart-Healthy Eating Plan  Heart-healthy meal planning includes:  · Eating less unhealthy fats.  · Eating more healthy fats.  · Making other changes in your diet.  Talk with your doctor or a diet specialist (dietitian) to create an eating plan that is right for you.  What is my plan?  Your doctor may recommend an eating plan that includes:  · Total fat: ______% or less of total calories a day.  · Saturated fat: ______% or less of total calories a day.  · Cholesterol: less than _________mg a  day.  What are tips for following this plan?  Cooking  Avoid frying your food. Try to bake, boil, grill, or broil it instead. You can also reduce fat by:  · Removing the skin from poultry.  · Removing all visible fats from meats.  · Steaming vegetables in water or broth.  Meal planning    · At meals, divide your plate into four equal parts:  ? Fill one-half of your plate with vegetables and green salads.  ? Fill one-fourth of your plate with whole grains.  ? Fill one-fourth of your plate with lean protein foods.  · Eat 4-5 servings of vegetables per day. A serving of vegetables is:  ? 1 cup of raw or cooked vegetables.  ? 2 cups of raw leafy greens.  · Eat 4-5 servings of fruit per day. A serving of fruit is:  ? 1 medium whole fruit.  ? ¼ cup of dried fruit.  ? ½ cup of fresh, frozen, or canned fruit.  ? ½ cup of 100% fruit juice.  · Eat more foods that have soluble fiber. These are apples, broccoli, carrots, beans, peas, and barley. Try to get 20-30 g of fiber per day.  · Eat 4-5 servings of nuts, legumes, and seeds per week:  ? 1 serving of dried beans or legumes equals ½ cup after being cooked.  ? 1 serving of nuts is ¼ cup.  ? 1 serving of seeds equals 1 tablespoon.  General information  · Eat more home-cooked food. Eat less restaurant, buffet, and fast food.  · Limit or avoid alcohol.  · Limit foods that are high in starch and sugar.  · Avoid fried foods.  · Lose weight if you are overweight.  · Keep track of how much salt (sodium) you eat. This is important if you have high blood pressure. Ask your doctor to tell you more about this.  · Try to add vegetarian meals each week.  Fats  · Choose healthy fats. These include olive oil and canola oil, flaxseeds, walnuts, almonds, and seeds.  · Eat more omega-3 fats. These include salmon, mackerel, sardines, tuna, flaxseed oil, and ground flaxseeds. Try to eat fish at least 2 times each week.  · Check food labels. Avoid foods with trans fats or high amounts of  saturated fat.  · Limit saturated fats.  ? These are often found in animal products, such as meats, butter, and cream.  ? These are also found in plant foods, such as palm oil, palm kernel oil, and coconut oil.  · Avoid foods with partially hydrogenated oils in them. These have trans fats. Examples are stick margarine, some tub margarines, cookies, crackers, and other baked goods.  What foods can I eat?  Fruits  All fresh, canned (in natural juice), or frozen fruits.  Vegetables  Fresh or frozen vegetables (raw, steamed, roasted, or grilled). Green salads.  Grains  Most grains. Choose whole wheat and whole grains most of the time. Rice and pasta, including brown rice and pastas made with whole wheat.  Meats and other proteins  Lean, well-trimmed beef, veal, pork, and lamb. Chicken and turkey without skin. All fish and shellfish. Wild duck, rabbit, pheasant, and venison. Egg whites or low-cholesterol egg substitutes. Dried beans, peas, lentils, and tofu. Seeds and most nuts.  Dairy  Low-fat or nonfat cheeses, including ricotta and mozzarella. Skim or 1% milk that is liquid, powdered, or evaporated. Buttermilk that is made with low-fat milk. Nonfat or low-fat yogurt.  Fats and oils  Non-hydrogenated (trans-free) margarines. Vegetable oils, including soybean, sesame, sunflower, olive, peanut, safflower, corn, canola, and cottonseed. Salad dressings or mayonnaise made with a vegetable oil.  Beverages  Mineral water. Coffee and tea. Diet carbonated beverages.  Sweets and desserts  Sherbet, gelatin, and fruit ice. Small amounts of dark chocolate.  Limit all sweets and desserts.  Seasonings and condiments  All seasonings and condiments.  The items listed above may not be a complete list of foods and drinks you can eat. Contact a dietitian for more options.  What foods should I avoid?  Fruits  Canned fruit in heavy syrup. Fruit in cream or butter sauce. Fried fruit. Limit coconut.  Vegetables  Vegetables cooked in cheese,  cream, or butter sauce. Fried vegetables.  Grains  Breads that are made with saturated or trans fats, oils, or whole milk. Croissants. Sweet rolls. Donuts. High-fat crackers, such as cheese crackers.  Meats and other proteins  Fatty meats, such as hot dogs, ribs, sausage, jones, rib-eye roast or steak. High-fat deli meats, such as salami and bologna. Caviar. Domestic duck and goose. Organ meats, such as liver.  Dairy  Cream, sour cream, cream cheese, and creamed cottage cheese. Whole-milk cheeses. Whole or 2% milk that is liquid, evaporated, or condensed. Whole buttermilk. Cream sauce or high-fat cheese sauce. Yogurt that is made from whole milk.  Fats and oils  Meat fat, or shortening. Cocoa butter, hydrogenated oils, palm oil, coconut oil, palm kernel oil. Solid fats and shortenings, including jones fat, salt pork, lard, and butter. Nondairy cream substitutes. Salad dressings with cheese or sour cream.  Beverages  Regular sodas and juice drinks with added sugar.  Sweets and desserts  Frosting. Pudding. Cookies. Cakes. Pies. Milk chocolate or white chocolate. Buttered syrups. Full-fat ice cream or ice cream drinks.  The items listed above may not be a complete list of foods and drinks to avoid. Contact a dietitian for more information.  Summary  · Heart-healthy meal planning includes eating less unhealthy fats, eating more healthy fats, and making other changes in your diet.  · Eat a balanced diet. This includes fruits and vegetables, low-fat or nonfat dairy, lean protein, nuts and legumes, whole grains, and heart-healthy oils and fats.  This information is not intended to replace advice given to you by your health care provider. Make sure you discuss any questions you have with your health care provider.  Document Released: 06/18/2013 Document Revised: 02/21/2019 Document Reviewed: 01/25/2019  [employer] Patient Education © 2020 [employer]  Obesity, Adult  Obesity is having too much body fat. Being obese means  that your weight is more than what is healthy for you.  BMI is a number that explains how much body fat you have. If you have a BMI of 30 or more, you are obese. Obesity is often caused by eating or drinking more calories than your body uses. Changing your lifestyle can help you lose weight.  Obesity can cause serious health problems, such as:  · Stroke.  · Coronary artery disease (CAD).  · Type 2 diabetes.  · Some types of cancer, including cancers of the colon, breast, uterus, and gallbladder.  · Osteoarthritis.  · High blood pressure (hypertension).  · High cholesterol.  · Sleep apnea.  · Gallbladder stones.  · Infertility problems.  What are the causes?  · Eating meals each day that are high in calories, sugar, and fat.  · Being born with genes that may make you more likely to become obese.  · Having a medical condition that causes obesity.  · Taking certain medicines.  · Sitting a lot (having a sedentary lifestyle).  · Not getting enough sleep.  · Drinking a lot of drinks that have sugar in them.  What increases the risk?  · Having a family history of obesity.  · Being an  woman.  · Being a  man.  · Living in an area with limited access to:  ? [place], recreation centers, or sidewalks.  ? Healthy food choices, such as grocery stores and [employer] markets.  What are the signs or symptoms?  The main sign is having too much body fat.  How is this treated?  · Treatment for this condition often includes changing your lifestyle. Treatment may include:  ? Changing your diet. This may include making a healthy meal plan.  ? Exercise. This may include activity that causes your heart to beat faster (aerobic exercise) and strength training. Work with your doctor to design a program that works for you.  ? Medicine to help you lose weight. This may be used if you are not able to lose 1 pound a week after 6 weeks of healthy eating and more exercise.  ? Treating conditions that cause the  obesity.  ? Surgery. Options may include gastric banding and gastric bypass. This may be done if:  § Other treatments have not helped to improve your condition.  § You have a BMI of 40 or higher.  § You have life-threatening health problems related to obesity.  Follow these instructions at home:  Eating and drinking    · Follow advice from your doctor about what to eat and drink. Your doctor may tell you to:  ? Limit fast food, sweets, and processed snack foods.  ? Choose low-fat options. For example, choose low-fat milk instead of whole milk.  ? Eat 5 or more servings of fruits or vegetables each day.  ? Eat at home more often. This gives you more control over what you eat.  ? Choose healthy foods when you eat out.  ? Learn to read food labels. This will help you learn how much food is in 1 serving.  ? Keep low-fat snacks available.  ? Avoid drinks that have a lot of sugar in them. These include soda, fruit juice, iced tea with sugar, and flavored milk.  · Drink enough water to keep your pee (urine) pale yellow.  · Do not go on fad diets.  Physical activity  · Exercise often, as told by your doctor. Most adults should get up to 150 minutes of moderate-intensity exercise every week.Ask your doctor:  ? What types of exercise are safe for you.  ? How often you should exercise.  · Warm up and stretch before being active.  · Do slow stretching after being active (cool down).  · Rest between times of being active.  Lifestyle  · Work with your doctor and a food expert (dietitian) to set a weight-loss goal that is best for you.  · Limit your screen time.  · Find ways to reward yourself that do not involve food.  · Do not drink alcohol if:  ? Your doctor tells you not to drink.  ? You are pregnant, may be pregnant, or are planning to become pregnant.  · If you drink alcohol:  ? Limit how much you use to:  § 0-1 drink a day for women.  § 0-2 drinks a day for men.  ? Be aware of how much alcohol is in your drink. In the [place]  one drink equals one 12 oz bottle of beer (355 mL), one 5 oz glass of wine (148 mL), or one 1½ oz glass of hard liquor (44 mL).  General instructions  · Keep a weight-loss journal. This can help you keep track of:  ? The food that you eat.  ? How much exercise you get.  · Take over-the-counter and prescription medicines only as told by your doctor.  · Take vitamins and supplements only as told by your doctor.  · Think about joining a support group.  · Keep all follow-up visits as told by your doctor. This is important.  Contact a doctor if:  · You cannot meet your weight loss goal after you have changed your diet and lifestyle for 6 weeks.  Get help right away if you:  · Are having trouble breathing.  · Are having thoughts of harming yourself.  Summary  · Obesity is having too much body fat.  · Being obese means that your weight is more than what is healthy for you.  · Work with your doctor to set a weight-loss goal.  · Get regular exercise as told by your doctor.  This information is not intended to replace advice given to you by your health care provider. Make sure you discuss any questions you have with your health care provider.  Document Released: 03/11/2013 Document Revised: 08/22/2019 Document Reviewed: 08/22/2019  [employer] Patient Education © 2020 [employer]

## 2020-11-24 ENCOUNTER — OFFICE VISIT (OUTPATIENT)
Dept: CARDIOLOGY | Facility: CLINIC | Age: 65
End: 2020-11-24

## 2020-11-24 VITALS
HEART RATE: 56 BPM | HEIGHT: 72 IN | BODY MASS INDEX: 34.54 KG/M2 | WEIGHT: 255 LBS | DIASTOLIC BLOOD PRESSURE: 68 MMHG | SYSTOLIC BLOOD PRESSURE: 116 MMHG | OXYGEN SATURATION: 97 %

## 2020-11-24 DIAGNOSIS — I25.708 CORONARY ARTERY DISEASE OF BYPASS GRAFT OF NATIVE HEART WITH STABLE ANGINA PECTORIS (HCC): Primary | ICD-10-CM

## 2020-11-24 DIAGNOSIS — Z95.1 PRESENCE OF CORONARY ARTERY BYPASS GRAFT STENT: ICD-10-CM

## 2020-11-24 DIAGNOSIS — I10 ESSENTIAL HYPERTENSION: Chronic | ICD-10-CM

## 2020-11-24 DIAGNOSIS — Z95.1 S/P CABG X 2: ICD-10-CM

## 2020-11-24 DIAGNOSIS — Z95.5 PRESENCE OF CORONARY ARTERY BYPASS GRAFT STENT: ICD-10-CM

## 2020-11-24 DIAGNOSIS — Z95.5 STENTED CORONARY ARTERY: ICD-10-CM

## 2020-11-24 DIAGNOSIS — E66.09 CLASS 1 OBESITY DUE TO EXCESS CALORIES WITH SERIOUS COMORBIDITY AND BODY MASS INDEX (BMI) OF 34.0 TO 34.9 IN ADULT: ICD-10-CM

## 2020-11-24 DIAGNOSIS — G47.33 OBSTRUCTIVE SLEEP APNEA ON CPAP: Chronic | ICD-10-CM

## 2020-11-24 DIAGNOSIS — E78.2 MIXED HYPERLIPIDEMIA: Chronic | ICD-10-CM

## 2020-11-24 DIAGNOSIS — Z99.89 OBSTRUCTIVE SLEEP APNEA ON CPAP: Chronic | ICD-10-CM

## 2020-11-24 PROCEDURE — 99214 OFFICE O/P EST MOD 30 MIN: CPT | Performed by: NURSE PRACTITIONER

## 2020-11-24 RX ORDER — ISOSORBIDE MONONITRATE 120 MG/1
120 TABLET, EXTENDED RELEASE ORAL DAILY
Qty: 90 TABLET | Refills: 3 | Status: SHIPPED | OUTPATIENT
Start: 2020-11-24 | End: 2021-08-09

## 2020-12-01 NOTE — PATIENT INSTRUCTIONS
Heart-Healthy Eating Plan  Heart-healthy meal planning includes:  · Eating less unhealthy fats.  · Eating more healthy fats.  · Making other changes in your diet.  Talk with your doctor or a diet specialist (dietitian) to create an eating plan that is right for you.  What is my plan?  Your doctor may recommend an eating plan that includes:  · Total fat: ______% or less of total calories a day.  · Saturated fat: ______% or less of total calories a day.  · Cholesterol: less than _________mg a day.  What are tips for following this plan?  Cooking  Avoid frying your food. Try to bake, boil, grill, or broil it instead. You can also reduce fat by:  · Removing the skin from poultry.  · Removing all visible fats from meats.  · Steaming vegetables in water or broth.  Meal planning    · At meals, divide your plate into four equal parts:  ? Fill one-half of your plate with vegetables and green salads.  ? Fill one-fourth of your plate with whole grains.  ? Fill one-fourth of your plate with lean protein foods.  · Eat 4-5 servings of vegetables per day. A serving of vegetables is:  ? 1 cup of raw or cooked vegetables.  ? 2 cups of raw leafy greens.  · Eat 4-5 servings of fruit per day. A serving of fruit is:  ? 1 medium whole fruit.  ? ¼ cup of dried fruit.  ? ½ cup of fresh, frozen, or canned fruit.  ? ½ cup of 100% fruit juice.  · Eat more foods that have soluble fiber. These are apples, broccoli, carrots, beans, peas, and barley. Try to get 20-30 g of fiber per day.  · Eat 4-5 servings of nuts, legumes, and seeds per week:  ? 1 serving of dried beans or legumes equals ½ cup after being cooked.  ? 1 serving of nuts is ¼ cup.  ? 1 serving of seeds equals 1 tablespoon.  General information  · Eat more home-cooked food. Eat less restaurant, buffet, and fast food.  · Limit or avoid alcohol.  · Limit foods that are high in starch and sugar.  · Avoid fried foods.  · Lose weight if you are overweight.  · Keep track of how much salt  (sodium) you eat. This is important if you have high blood pressure. Ask your doctor to tell you more about this.  · Try to add vegetarian meals each week.  Fats  · Choose healthy fats. These include olive oil and canola oil, flaxseeds, walnuts, almonds, and seeds.  · Eat more omega-3 fats. These include salmon, mackerel, sardines, tuna, flaxseed oil, and ground flaxseeds. Try to eat fish at least 2 times each week.  · Check food labels. Avoid foods with trans fats or high amounts of saturated fat.  · Limit saturated fats.  ? These are often found in animal products, such as meats, butter, and cream.  ? These are also found in plant foods, such as palm oil, palm kernel oil, and coconut oil.  · Avoid foods with partially hydrogenated oils in them. These have trans fats. Examples are stick margarine, some tub margarines, cookies, crackers, and other baked goods.  What foods can I eat?  Fruits  All fresh, canned (in natural juice), or frozen fruits.  Vegetables  Fresh or frozen vegetables (raw, steamed, roasted, or grilled). Green salads.  Grains  Most grains. Choose whole wheat and whole grains most of the time. Rice and pasta, including brown rice and pastas made with whole wheat.  Meats and other proteins  Lean, well-trimmed beef, veal, pork, and lamb. Chicken and turkey without skin. All fish and shellfish. Wild duck, rabbit, pheasant, and venison. Egg whites or low-cholesterol egg substitutes. Dried beans, peas, lentils, and tofu. Seeds and most nuts.  Dairy  Low-fat or nonfat cheeses, including ricotta and mozzarella. Skim or 1% milk that is liquid, powdered, or evaporated. Buttermilk that is made with low-fat milk. Nonfat or low-fat yogurt.  Fats and oils  Non-hydrogenated (trans-free) margarines. Vegetable oils, including soybean, sesame, sunflower, olive, peanut, safflower, corn, canola, and cottonseed. Salad dressings or mayonnaise made with a vegetable oil.  Beverages  Mineral water. Coffee and tea. Diet  carbonated beverages.  Sweets and desserts  Sherbet, gelatin, and fruit ice. Small amounts of dark chocolate.  Limit all sweets and desserts.  Seasonings and condiments  All seasonings and condiments.  The items listed above may not be a complete list of foods and drinks you can eat. Contact a dietitian for more options.  What foods should I avoid?  Fruits  Canned fruit in heavy syrup. Fruit in cream or butter sauce. Fried fruit. Limit coconut.  Vegetables  Vegetables cooked in cheese, cream, or butter sauce. Fried vegetables.  Grains  Breads that are made with saturated or trans fats, oils, or whole milk. Croissants. Sweet rolls. Donuts. High-fat crackers, such as cheese crackers.  Meats and other proteins  Fatty meats, such as hot dogs, ribs, sausage, jones, rib-eye roast or steak. High-fat deli meats, such as salami and bologna. Caviar. Domestic duck and goose. Organ meats, such as liver.  Dairy  Cream, sour cream, cream cheese, and creamed cottage cheese. Whole-milk cheeses. Whole or 2% milk that is liquid, evaporated, or condensed. Whole buttermilk. Cream sauce or high-fat cheese sauce. Yogurt that is made from whole milk.  Fats and oils  Meat fat, or shortening. Cocoa butter, hydrogenated oils, palm oil, coconut oil, palm kernel oil. Solid fats and shortenings, including jones fat, salt pork, lard, and butter. Nondairy cream substitutes. Salad dressings with cheese or sour cream.  Beverages  Regular sodas and juice drinks with added sugar.  Sweets and desserts  Frosting. Pudding. Cookies. Cakes. Pies. Milk chocolate or white chocolate. Buttered syrups. Full-fat ice cream or ice cream drinks.  The items listed above may not be a complete list of foods and drinks to avoid. Contact a dietitian for more information.  Summary  · Heart-healthy meal planning includes eating less unhealthy fats, eating more healthy fats, and making other changes in your diet.  · Eat a balanced diet. This includes fruits and  vegetables, low-fat or nonfat dairy, lean protein, nuts and legumes, whole grains, and heart-healthy oils and fats.  This information is not intended to replace advice given to you by your health care provider. Make sure you discuss any questions you have with your health care provider.  Document Revised: 02/21/2019 Document Reviewed: 01/25/2019  [employer] Patient Education © 2020 [employer]  Exercising to Lose Weight  Exercise is structured, repetitive physical activity to improve fitness and health. Getting regular exercise is important for everyone. It is especially important if you are overweight. Being overweight increases your risk of heart disease, stroke, diabetes, high blood pressure, and several types of cancer. Reducing your calorie intake and exercising can help you lose weight.  Exercise is usually categorized as moderate or vigorous intensity. To lose weight, most people need to do a certain amount of moderate-intensity or vigorous-intensity exercise each week.  Moderate-intensity exercise    Moderate-intensity exercise is any activity that gets you moving enough to burn at least three times more energy (calories) than if you were sitting.  Examples of moderate exercise include:  · Walking a mile in 15 minutes.  · Doing light yard work.  · Biking at an easy pace.  Most people should get at least 150 minutes (2 hours and 30 minutes) a week of moderate-intensity exercise to maintain their body weight.  Vigorous-intensity exercise  Vigorous-intensity exercise is any activity that gets you moving enough to burn at least six times more calories than if you were sitting. When you exercise at this intensity, you should be working hard enough that you are not able to carry on a conversation.  Examples of vigorous exercise include:  · Running.  · Playing a team sport, such as football, basketball, and soccer.  · Jumping rope.  Most people should get at least 75 minutes (1 hour and 15 minutes) a week of  vigorous-intensity exercise to maintain their body weight.  How can exercise affect me?  When you exercise enough to burn more calories than you eat, you lose weight. Exercise also reduces body fat and builds muscle. The more muscle you have, the more calories you burn. Exercise also:  · Improves mood.  · Reduces stress and tension.  · Improves your overall fitness, flexibility, and endurance.  · Increases bone strength.  The amount of exercise you need to lose weight depends on:  · Your age.  · The type of exercise.  · Any health conditions you have.  · Your overall physical ability.  Talk to your health care provider about how much exercise you need and what types of activities are safe for you.  What actions can I take to lose weight?  Nutrition    · Make changes to your diet as told by your health care provider or diet and nutrition specialist (dietitian). This may include:  ? Eating fewer calories.  ? Eating more protein.  ? Eating less unhealthy fats.  ? Eating a diet that includes fresh fruits and vegetables, whole grains, low-fat dairy products, and lean protein.  ? Avoiding foods with added fat, salt, and sugar.  · Drink plenty of water while you exercise to prevent dehydration or heat stroke.  Activity  · Choose an activity that you enjoy and set realistic goals. Your health care provider can help you make an exercise plan that works for you.  · Exercise at a moderate or vigorous intensity most days of the week.  ? The intensity of exercise may vary from person to person. You can tell how intense a workout is for you by paying attention to your breathing and heartbeat. Most people will notice their breathing and heartbeat get faster with more intense exercise.  · Do resistance training twice each week, such as:  ? Push-ups.  ? Sit-ups.  ? Lifting weights.  ? Using resistance bands.  · Getting short amounts of exercise can be just as helpful as long structured periods of exercise. If you have trouble  finding time to exercise, try to include exercise in your daily routine.  ? Get up, stretch, and walk around every 30 minutes throughout the day.  ? Go for a walk during your lunch break.  ? Park your car farther away from your destination.  ? If you take public transportation, get off one stop early and walk the rest of the way.  ? Make phone calls while standing up and walking around.  ? Take the stairs instead of elevators or escalators.  · Wear comfortable clothes and shoes with good support.  · Do not exercise so much that you hurt yourself, feel dizzy, or get very short of breath.  Where to find more information  · [place] Department of Health and Human Services: www.hhs.gov  · Centers for Disease Control and Prevention (CDC): www.cdc.gov  Contact a health care provider:  · Before starting a new exercise program.  · If you have questions or concerns about your weight.  · If you have a medical problem that keeps you from exercising.  Get help right away if you have any of the following while exercising:  · Injury.  · Dizziness.  · Difficulty breathing or shortness of breath that does not go away when you stop exercising.  · Chest pain.  · Rapid heartbeat.  Summary  · Being overweight increases your risk of heart disease, stroke, diabetes, high blood pressure, and several types of cancer.  · Losing weight happens when you burn more calories than you eat.  · Reducing the amount of calories you eat in addition to getting regular moderate or vigorous exercise each week helps you lose weight.  This information is not intended to replace advice given to you by your health care provider. Make sure you discuss any questions you have with your health care provider.  Document Revised: 12/31/2018 Document Reviewed: 12/31/2018  [employer] Patient Education © 2020 [email or web address]  Obesity, Adult  Obesity is having too much body fat. Being obese means that your weight is more than what is healthy for you.  BMI is a number that  explains how much body fat you have. If you have a BMI of 30 or more, you are obese. Obesity is often caused by eating or drinking more calories than your body uses. Changing your lifestyle can help you lose weight.  Obesity can cause serious health problems, such as:  · Stroke.  · Coronary artery disease (CAD).  · Type 2 diabetes.  · Some types of cancer, including cancers of the colon, breast, uterus, and gallbladder.  · Osteoarthritis.  · High blood pressure (hypertension).  · High cholesterol.  · Sleep apnea.  · Gallbladder stones.  · Infertility problems.  What are the causes?  · Eating meals each day that are high in calories, sugar, and fat.  · Being born with genes that may make you more likely to become obese.  · Having a medical condition that causes obesity.  · Taking certain medicines.  · Sitting a lot (having a sedentary lifestyle).  · Not getting enough sleep.  · Drinking a lot of drinks that have sugar in them.  What increases the risk?  · Having a family history of obesity.  · Being an  woman.  · Being a  man.  · Living in an area with limited access to:  ? [place], recreation centers, or sidewalks.  ? Healthy food choices, such as grocery stores and farmers' markets.  What are the signs or symptoms?  The main sign is having too much body fat.  How is this treated?  · Treatment for this condition often includes changing your lifestyle. Treatment may include:  ? Changing your diet. This may include making a healthy meal plan.  ? Exercise. This may include activity that causes your heart to beat faster (aerobic exercise) and strength training. Work with your doctor to design a program that works for you.  ? Medicine to help you lose weight. This may be used if you are not able to lose 1 pound a week after 6 weeks of healthy eating and more exercise.  ? Treating conditions that cause the obesity.  ? Surgery. Options may include gastric banding and gastric bypass. This may be done  if:  § Other treatments have not helped to improve your condition.  § You have a BMI of 40 or higher.  § You have life-threatening health problems related to obesity.  Follow these instructions at home:  Eating and drinking    · Follow advice from your doctor about what to eat and drink. Your doctor may tell you to:  ? Limit fast food, sweets, and processed snack foods.  ? Choose low-fat options. For example, choose low-fat milk instead of whole milk.  ? Eat 5 or more servings of fruits or vegetables each day.  ? Eat at home more often. This gives you more control over what you eat.  ? Choose healthy foods when you eat out.  ? Learn to read food labels. This will help you learn how much food is in 1 serving.  ? Keep low-fat snacks available.  ? Avoid drinks that have a lot of sugar in them. These include soda, fruit juice, iced tea with sugar, and flavored milk.  · Drink enough water to keep your pee (urine) pale yellow.  · Do not go on fad diets.  Physical activity  · Exercise often, as told by your doctor. Most adults should get up to 150 minutes of moderate-intensity exercise every week.Ask your doctor:  ? What types of exercise are safe for you.  ? How often you should exercise.  · Warm up and stretch before being active.  · Do slow stretching after being active (cool down).  · Rest between times of being active.  Lifestyle  · Work with your doctor and a food expert (dietitian) to set a weight-loss goal that is best for you.  · Limit your screen time.  · Find ways to reward yourself that do not involve food.  · Do not drink alcohol if:  ? Your doctor tells you not to drink.  ? You are pregnant, may be pregnant, or are planning to become pregnant.  · If you drink alcohol:  ? Limit how much you use to:  § 0-1 drink a day for women.  § 0-2 drinks a day for men.  ? Be aware of how much alcohol is in your drink. In the [place] one drink equals one 12 oz bottle of beer (355 mL), one 5 oz glass of wine (148 mL), or one  1½ oz glass of hard liquor (44 mL).  General instructions  · Keep a weight-loss journal. This can help you keep track of:  ? The food that you eat.  ? How much exercise you get.  · Take over-the-counter and prescription medicines only as told by your doctor.  · Take vitamins and supplements only as told by your doctor.  · Think about joining a support group.  · Keep all follow-up visits as told by your doctor. This is important.  Contact a doctor if:  · You cannot meet your weight loss goal after you have changed your diet and lifestyle for 6 weeks.  Get help right away if you:  · Are having trouble breathing.  · Are having thoughts of harming yourself.  Summary  · Obesity is having too much body fat.  · Being obese means that your weight is more than what is healthy for you.  · Work with your doctor to set a weight-loss goal.  · Get regular exercise as told by your doctor.  This information is not intended to replace advice given to you by your health care provider. Make sure you discuss any questions you have with your health care provider.  Document Revised: 08/22/2019 Document Reviewed: 08/22/2019  [employer] Patient Education © 2020 [employer]

## 2020-12-01 NOTE — PROGRESS NOTES
Subjective:     Encounter Date:12/09/2020      Patient ID: Lio Velasquez is a 64 y.o. male   HPI: This patient presents today for routine follow-up of medication adjustments.  Imdur was increased to 120 mg daily at his last office visit on 11/24/2020.  He reports decrease in severity and frequency of chest pain since that time. He has a history of coronary artery disease status post CABG x2 (LIMA to LAD and SVG to RCA) with subsequent 3.25 x 38 mm stent to the saphenous vein graft to the right coronary artery on 7/8/2016 and 3.5 x 38 mm Jamee drug-eluting stent to the mid saphenous vein graft to the right coronary artery and 3.5 x 23 mm Jamee drug-eluting stent to the distal saphenous vein graft to the right coronary artery 8/12/2019, hypertension, hyperlipidemia, obstructive sleep apnea and obesity.Left heart catheterization on 10/4/2020 revealed widely patent previously placed proximal LAD stent, 100% stenosis of mid LAD, 100% stenosis of proximal RCA, patent LIMA to the LAD and complete occlusion of the saphenous vein graft to the right coronary artery with vessel to vessel collaterals noted from the proximal to distal right coronary artery as well as the obtuse marginal branch.  As saphenous vein graft to the right coronary artery was 100% occluded, no PCI was performed. He continues to complain of sharp, exertional, substernal chest pain that does not radiate. The pain is associated with shortness of breath and is relieved with rest. He reports that pain occurs with minimal exertion. Patient denies palpitations, dizziness, syncope, orthopnea, PND, edema or decreased stamina.  Patient denies any signs of bleeding.    Chief Complaint: Routine follow-up  Coronary Artery Disease  Presents for follow-up visit. Symptoms include chest pain and shortness of breath. Pertinent negatives include no chest pressure, chest tightness, dizziness, leg swelling, muscle weakness, palpitations or weight gain. Risk factors  include hyperlipidemia and obesity. The symptoms have been stable. Compliance with diet is variable. Compliance with exercise is variable. Compliance with medications is good.   Hypertension  This is a chronic problem. The current episode started more than 1 year ago. The problem is controlled. Associated symptoms include chest pain and shortness of breath. Pertinent negatives include no anxiety, blurred vision, headaches, malaise/fatigue, neck pain, orthopnea, palpitations, peripheral edema, PND or sweats. Risk factors for coronary artery disease include male gender, obesity and dyslipidemia. Current antihypertension treatment includes beta blockers and ACE inhibitors. The current treatment provides significant improvement. Hypertensive end-organ damage includes CAD/MI.   Hyperlipidemia  This is a chronic problem. The current episode started more than 1 year ago. Exacerbating diseases include obesity. Associated symptoms include chest pain and shortness of breath. Current antihyperlipidemic treatment includes statins. Risk factors for coronary artery disease include hypertension, male sex, obesity and dyslipidemia.       Previous Cardiac Testing:  Results for orders placed during the hospital encounter of 10/03/20   Adult Transthoracic Echo Complete W/ Cont if Necessary Per Protocol    Narrative · Left ventricular ejection fraction appears to be 56 - 60%. Left   ventricular systolic function is normal.  · Normal diastolic function  · No pulmonary hypertension        Results for orders placed during the hospital encounter of 10/03/20   Cardiac Catheterization/Vascular Study    Narrative · Mid LAD lesion is 100% stenosed.  · Prox RCA lesion is 100% stenosed. Vessel two-vessel collaterals noted   from proximal to distal right coronary arteryAlso collaterals noted to   obtuse marginal branch  · Origin to Prox Graft lesion is 100% stenosed.  · Mild systolic dysfunction.  · The ejection fraction was 40-50% by visual  estimate.  · No mitral valve regurgitation.  · No aortic valve stenosis.  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    Allergies   Allergen Reactions   • Demerol [Meperidine]    • Morphine And Related Itching       Current Outpatient Medications:   •  aspirin 81 MG tablet, Take 81 mg by mouth every night., Disp: , Rfl:   •  folic acid-pyridoxine-cyanocobalamin (FOLTX) 2.5-25-2 MG tablet tablet, Take 1 tablet by mouth every night., Disp: , Rfl:   •  isosorbide mononitrate (IMDUR) 120 MG 24 hr tablet, Take 1 tablet by mouth Daily., Disp: 90 tablet, Rfl: 3  •  metoprolol succinate XL (TOPROL-XL) 50 MG 24 hr tablet, Take 50 mg by mouth every night., Disp: , Rfl:   •  Multiple Vitamins-Minerals (CENTRUM CARDIO PO), Take 1 tablet by mouth Every Night., Disp: , Rfl:   •  nitroglycerin (NITROSTAT) 0.4 MG SL tablet, 1 under the tongue as needed for angina, may repeat q5mins for up three doses, Disp: 100 tablet, Rfl: 11  •  ramipril (ALTACE) 2.5 MG capsule, Take 2.5 mg by mouth every night., Disp: , Rfl:   •  rosuvastatin (CRESTOR) 10 MG tablet, Take 1 tablet by mouth Every Night., Disp: 90 tablet, Rfl: 3  •  ticagrelor (BRILINTA) 90 MG tablet tablet, Take 1 tablet by mouth Every 12 (Twelve) Hours., Disp: 180 tablet, Rfl: 3  •  Vortioxetine HBr (TRINTELLIX) 20 MG tablet, Take 20 mg by mouth Daily., Disp: , Rfl:   Past Medical History:   Diagnosis Date   • Aortocoronary bypass status    • CAD in native artery    • Chest pain    • Coronary angioplasty status    • Hyperlipidemia     unspecified   • Hypertension, benign    • Obesity    • Sleep apnea     unspecified      Past Surgical History:   Procedure Laterality Date   • ANKLE SURGERY      with screws    • CARDIAC CATHETERIZATION Left 03/05/2012 7/2016   • CARDIAC CATHETERIZATION Left 8/12/2019    Procedure: Cardiac Catheterization/Vascular Study BRIANNA OK;  Surgeon: Storm Encinas MD;  Location:  [facility];  Service: Cardiology   • CARDIAC CATHETERIZATION Left 10/4/2020    Procedure: Cardiac Catheterization/Vascular Study 6F Right groin ;  Surgeon: Storm Encinas MD;  Location:  [facility];  Service: Cardiology;  Laterality: Left;   • COLONOSCOPY  12/30/2008   • COLONOSCOPY  12/30/2013    two polyps removed from the sigmoid colon   • COLONOSCOPY N/A 6/21/2019    Procedure: COLONOSCOPY WITH ANESTHESIA;  Surgeon: Marciano Jaramillo DO;  Location:  [facility];  Service: Gastroenterology   • CORONARY ARTERY BYPASS GRAFT  1993    x2   • CORONARY STENT PLACEMENT  2019    X 3   • JOINT REPLACEMENT Left     left ankle   • SHOULDER SURGERY Right     with chano   • WRIST SURGERY Left      Family History   Problem Relation Age of Onset   • Diabetes Mother         type II   • Heart failure Mother         congestive heart failure   • Colon polyps Neg Hx    • Esophageal cancer Neg Hx    • Rectal cancer Neg Hx    • Colon cancer Neg Hx      Social History     Socioeconomic History   • Marital status:      Spouse name: Not on file   • Number of children: Not on file   • Years of education: Not on file   • Highest education level: Not on file   Tobacco Use   • Smoking status: Never Smoker   • Smokeless tobacco: Never Used   Substance and Sexual Activity   • Alcohol use: Not Currently     Comment: occasional   • Drug use: No   • Sexual activity: Defer       Review of Systems   Constitution: Negative for chills, decreased appetite, fever, malaise/fatigue, night sweats, weight gain and weight loss.   HENT: Negative for nosebleeds.    Eyes: Negative for blurred vision and visual disturbance.    Cardiovascular: Positive for chest pain. Negative for dyspnea on exertion, leg swelling, near-syncope, orthopnea, palpitations, paroxysmal nocturnal dyspnea and syncope.   Respiratory: Positive for shortness of breath. Negative for chest tightness, cough, hemoptysis, snoring and wheezing.    Endocrine: Negative for cold intolerance and heat intolerance.   Hematologic/Lymphatic: Does not bruise/bleed easily.   Skin: Negative for rash.   Musculoskeletal: Negative for back pain, falls, muscle weakness and neck pain.   Gastrointestinal: Negative for abdominal pain, change in bowel habit, constipation, diarrhea, dysphagia, heartburn, nausea and vomiting.   Genitourinary: Negative for hematuria.   Neurological: Negative for dizziness, headaches, light-headedness and weakness.   Psychiatric/Behavioral: Negative for altered mental status.   Allergic/Immunologic: Negative for persistent infections.              Objective:     Vitals signs and nursing note reviewed.   Constitutional:       General: Awake.      Appearance: Normal and healthy appearance. Well-developed, normal weight and not in distress.   Eyes:      General: Lids are normal.      Conjunctiva/sclera: Conjunctivae normal.      Pupils: Pupils are equal, round, and reactive to light.   HENT:      Head: Normocephalic and atraumatic.      Nose: Nose normal.   Neck:      Musculoskeletal: Normal range of motion.      Vascular: No JVR. JVD normal.   Pulmonary:      Effort: Pulmonary effort is normal.      Breath sounds: Normal breath sounds. No wheezing. No rhonchi. No rales.   Chest:      Chest wall: Not tender to palpatation.   Cardiovascular:      PMI at left midclavicular line. Normal rate. Regular rhythm. Normal S1. Normal S2.      Murmurs: There is no murmur.      No gallop. No click. No rub.   Pulses:     Intact distal pulses.   Edema:     Peripheral edema absent.   Abdominal:      General: Bowel sounds are normal.      Palpations: Abdomen is soft.       Tenderness: There is no abdominal tenderness.   Musculoskeletal: Normal range of motion.         General: No tenderness.   Skin:     General: Skin is warm and dry.   Neurological:      General: No focal deficit present.      Mental Status: Alert, oriented to person, place, and time and oriented to person, place and time.   Psychiatric:         Attention and Perception: Attention and perception normal.         Mood and Affect: Mood and affect normal.         Speech: Speech normal.         Behavior: Behavior normal. Behavior is cooperative.         Thought Content: Thought content normal.         Cognition and Memory: Cognition and memory normal.         Judgment: Judgment normal.           Procedures  There were no vitals taken for this visit.  Lab Review:   Lab Results   Component Value Date    WBC 7.10 10/05/2020    HGB 13.4 10/05/2020    HCT 38.6 10/05/2020    MCV 91.7 10/05/2020     10/05/2020     Lab Results   Component Value Date    GLUCOSE 133 (H) 10/05/2020    BUN 11 10/05/2020    CREATININE 0.77 10/05/2020    EGFRIFNONA 102 10/05/2020    BCR 14.3 10/05/2020    K 3.8 10/05/2020    CO2 24.0 10/05/2020    CALCIUM 8.9 10/05/2020    ALBUMIN 4.50 10/03/2020    AST 30 10/03/2020    ALT 25 10/03/2020     Lab Results   Component Value Date    CHOL 119 10/04/2020    CHOL 129 (L) 08/13/2019    CHLPL 143 07/07/2016     Lab Results   Component Value Date    TRIG 192 (H) 10/04/2020    TRIG 180 (H) 08/13/2019    TRIG 180 (H) 07/07/2016     Lab Results   Component Value Date    HDL 28 (L) 10/04/2020    HDL 27 (L) 08/13/2019    HDL 31 07/07/2016     Lab Results   Component Value Date    LDL 53 10/04/2020    LDL 87 08/13/2019    LDL 93 07/07/2016       I have reviewed the most recent lab results.       Assessment:          Diagnosis Plan   1. Coronary artery disease of bypass graft of native heart with stable angina pectoris (CMS/[facility])  Start Ranexa 500 mg twice daily.     2. S/P CABG x 2  LIMA to LAD and SVG to RCA  (occluded) 1993   3. Stented coronary artery   Continues on aspirin and Brilinta. Denies bleeding.    4. Presence of coronary artery bypass graft stent  SVG to RCA X3 most recent 8/12/19   5. Essential hypertension  Well controlled.    6. Mixed hyperlipidemia  Managed by PCP. On statin. Well controlled.    7. Obstructive sleep apnea on CPAP  Compliant.    8. Class 1 obesity due to excess calories with serious comorbidity and body mass index (BMI) of 34.0 to 34.9 in adult  Patient's There is no height or weight on file to calculate BMI. BMI is above normal parameters. Recommendations include: educational material.            Plan:         1. Start Ranexa 500 mg twice daily. Continue other medications as previously prescribed.  2. Report any worsening symptoms.  3. Report any signs of bleeding.  4. Continue heart healthy diet and regular exercise as tolerated.   5. Follow up with PCP for blood pressure and cholesterol management and routine lab work.  6. Follow up in two weeks, or sooner if needed. Consider up-titration of Ranexa at that time, if symptoms persist.

## 2020-12-09 ENCOUNTER — OFFICE VISIT (OUTPATIENT)
Dept: CARDIOLOGY | Facility: CLINIC | Age: 65
End: 2020-12-09

## 2020-12-09 VITALS
DIASTOLIC BLOOD PRESSURE: 66 MMHG | SYSTOLIC BLOOD PRESSURE: 118 MMHG | HEART RATE: 61 BPM | WEIGHT: 258 LBS | BODY MASS INDEX: 34.95 KG/M2 | OXYGEN SATURATION: 98 % | HEIGHT: 72 IN

## 2020-12-09 DIAGNOSIS — Z95.1 PRESENCE OF CORONARY ARTERY BYPASS GRAFT STENT: ICD-10-CM

## 2020-12-09 DIAGNOSIS — Z95.5 STENTED CORONARY ARTERY: ICD-10-CM

## 2020-12-09 DIAGNOSIS — Z95.5 PRESENCE OF CORONARY ARTERY BYPASS GRAFT STENT: ICD-10-CM

## 2020-12-09 DIAGNOSIS — E66.09 CLASS 1 OBESITY DUE TO EXCESS CALORIES WITH SERIOUS COMORBIDITY AND BODY MASS INDEX (BMI) OF 34.0 TO 34.9 IN ADULT: ICD-10-CM

## 2020-12-09 DIAGNOSIS — Z99.89 OBSTRUCTIVE SLEEP APNEA ON CPAP: Chronic | ICD-10-CM

## 2020-12-09 DIAGNOSIS — Z95.1 S/P CABG X 2: ICD-10-CM

## 2020-12-09 DIAGNOSIS — G47.33 OBSTRUCTIVE SLEEP APNEA ON CPAP: Chronic | ICD-10-CM

## 2020-12-09 DIAGNOSIS — E78.2 MIXED HYPERLIPIDEMIA: Chronic | ICD-10-CM

## 2020-12-09 DIAGNOSIS — I25.708 CORONARY ARTERY DISEASE OF BYPASS GRAFT OF NATIVE HEART WITH STABLE ANGINA PECTORIS (HCC): Primary | ICD-10-CM

## 2020-12-09 DIAGNOSIS — I10 ESSENTIAL HYPERTENSION: Chronic | ICD-10-CM

## 2020-12-09 PROCEDURE — 99214 OFFICE O/P EST MOD 30 MIN: CPT | Performed by: NURSE PRACTITIONER

## 2020-12-09 RX ORDER — RANOLAZINE 500 MG/1
500 TABLET, EXTENDED RELEASE ORAL 2 TIMES DAILY
Qty: 60 TABLET | Refills: 11 | Status: SHIPPED | OUTPATIENT
Start: 2020-12-09 | End: 2021-01-05 | Stop reason: SDUPTHER

## 2021-01-04 NOTE — PATIENT INSTRUCTIONS
Exercising to Lose Weight  Exercise is structured, repetitive physical activity to improve fitness and health. Getting regular exercise is important for everyone. It is especially important if you are overweight. Being overweight increases your risk of heart disease, stroke, diabetes, high blood pressure, and several types of cancer. Reducing your calorie intake and exercising can help you lose weight.  Exercise is usually categorized as moderate or vigorous intensity. To lose weight, most people need to do a certain amount of moderate-intensity or vigorous-intensity exercise each week.  Moderate-intensity exercise    Moderate-intensity exercise is any activity that gets you moving enough to burn at least three times more energy (calories) than if you were sitting.  Examples of moderate exercise include:  · Walking a mile in 15 minutes.  · Doing light yard work.  · Biking at an easy pace.  Most people should get at least 150 minutes (2 hours and 30 minutes) a week of moderate-intensity exercise to maintain their body weight.  Vigorous-intensity exercise  Vigorous-intensity exercise is any activity that gets you moving enough to burn at least six times more calories than if you were sitting. When you exercise at this intensity, you should be working hard enough that you are not able to carry on a conversation.  Examples of vigorous exercise include:  · Running.  · Playing a team sport, such as football, basketball, and soccer.  · Jumping rope.  Most people should get at least 75 minutes (1 hour and 15 minutes) a week of vigorous-intensity exercise to maintain their body weight.  How can exercise affect me?  When you exercise enough to burn more calories than you eat, you lose weight. Exercise also reduces body fat and builds muscle. The more muscle you have, the more calories you burn. Exercise also:  · Improves mood.  · Reduces stress and tension.  · Improves your overall fitness, flexibility, and  endurance.  · Increases bone strength.  The amount of exercise you need to lose weight depends on:  · Your age.  · The type of exercise.  · Any health conditions you have.  · Your overall physical ability.  Talk to your health care provider about how much exercise you need and what types of activities are safe for you.  What actions can I take to lose weight?  Nutrition    · Make changes to your diet as told by your health care provider or diet and nutrition specialist (dietitian). This may include:  ? Eating fewer calories.  ? Eating more protein.  ? Eating less unhealthy fats.  ? Eating a diet that includes fresh fruits and vegetables, whole grains, low-fat dairy products, and lean protein.  ? Avoiding foods with added fat, salt, and sugar.  · Drink plenty of water while you exercise to prevent dehydration or heat stroke.  Activity  · Choose an activity that you enjoy and set realistic goals. Your health care provider can help you make an exercise plan that works for you.  · Exercise at a moderate or vigorous intensity most days of the week.  ? The intensity of exercise may vary from person to person. You can tell how intense a workout is for you by paying attention to your breathing and heartbeat. Most people will notice their breathing and heartbeat get faster with more intense exercise.  · Do resistance training twice each week, such as:  ? Push-ups.  ? Sit-ups.  ? Lifting weights.  ? Using resistance bands.  · Getting short amounts of exercise can be just as helpful as long structured periods of exercise. If you have trouble finding time to exercise, try to include exercise in your daily routine.  ? Get up, stretch, and walk around every 30 minutes throughout the day.  ? Go for a walk during your lunch break.  ? Park your car farther away from your destination.  ? If you take public transportation, get off one stop early and walk the rest of the way.  ? Make phone calls while standing up and walking  around.  ? Take the stairs instead of elevators or escalators.  · Wear comfortable clothes and shoes with good support.  · Do not exercise so much that you hurt yourself, feel dizzy, or get very short of breath.  Where to find more information  · [place] Department of Health and Human Services: www.hhs.gov  · Centers for Disease Control and Prevention (CDC): www.cdc.gov  Contact a health care provider:  · Before starting a new exercise program.  · If you have questions or concerns about your weight.  · If you have a medical problem that keeps you from exercising.  Get help right away if you have any of the following while exercising:  · Injury.  · Dizziness.  · Difficulty breathing or shortness of breath that does not go away when you stop exercising.  · Chest pain.  · Rapid heartbeat.  Summary  · Being overweight increases your risk of heart disease, stroke, diabetes, high blood pressure, and several types of cancer.  · Losing weight happens when you burn more calories than you eat.  · Reducing the amount of calories you eat in addition to getting regular moderate or vigorous exercise each week helps you lose weight.  This information is not intended to replace advice given to you by your health care provider. Make sure you discuss any questions you have with your health care provider.  Document Revised: 12/31/2018 Document Reviewed: 12/31/2018  [employer] Patient Education © 2020 "[employer]  Heart-Healthy Eating Plan  Heart-healthy meal planning includes:  · Eating less unhealthy fats.  · Eating more healthy fats.  · Making other changes in your diet.  Talk with your doctor or a diet specialist (dietitian) to create an eating plan that is right for you.  What is my plan?  Your doctor may recommend an eating plan that includes:  · Total fat: ______% or less of total calories a day.  · Saturated fat: ______% or less of total calories a day.  · Cholesterol: less than _________mg a day.  What are tips for following this  plan?  Cooking  Avoid frying your food. Try to bake, boil, grill, or broil it instead. You can also reduce fat by:  · Removing the skin from poultry.  · Removing all visible fats from meats.  · Steaming vegetables in water or broth.  Meal planning    · At meals, divide your plate into four equal parts:  ? Fill one-half of your plate with vegetables and green salads.  ? Fill one-fourth of your plate with whole grains.  ? Fill one-fourth of your plate with lean protein foods.  · Eat 4-5 servings of vegetables per day. A serving of vegetables is:  ? 1 cup of raw or cooked vegetables.  ? 2 cups of raw leafy greens.  · Eat 4-5 servings of fruit per day. A serving of fruit is:  ? 1 medium whole fruit.  ? ¼ cup of dried fruit.  ? ½ cup of fresh, frozen, or canned fruit.  ? ½ cup of 100% fruit juice.  · Eat more foods that have soluble fiber. These are apples, broccoli, carrots, beans, peas, and barley. Try to get 20-30 g of fiber per day.  · Eat 4-5 servings of nuts, legumes, and seeds per week:  ? 1 serving of dried beans or legumes equals ½ cup after being cooked.  ? 1 serving of nuts is ¼ cup.  ? 1 serving of seeds equals 1 tablespoon.  General information  · Eat more home-cooked food. Eat less restaurant, buffet, and fast food.  · Limit or avoid alcohol.  · Limit foods that are high in starch and sugar.  · Avoid fried foods.  · Lose weight if you are overweight.  · Keep track of how much salt (sodium) you eat. This is important if you have high blood pressure. Ask your doctor to tell you more about this.  · Try to add vegetarian meals each week.  Fats  · Choose healthy fats. These include olive oil and canola oil, flaxseeds, walnuts, almonds, and seeds.  · Eat more omega-3 fats. These include salmon, mackerel, sardines, tuna, flaxseed oil, and ground flaxseeds. Try to eat fish at least 2 times each week.  · Check food labels. Avoid foods with trans fats or high amounts of saturated fat.  · Limit saturated  fats.  ? These are often found in animal products, such as meats, butter, and cream.  ? These are also found in plant foods, such as palm oil, palm kernel oil, and coconut oil.  · Avoid foods with partially hydrogenated oils in them. These have trans fats. Examples are stick margarine, some tub margarines, cookies, crackers, and other baked goods.  What foods can I eat?  Fruits  All fresh, canned (in natural juice), or frozen fruits.  Vegetables  Fresh or frozen vegetables (raw, steamed, roasted, or grilled). Green salads.  Grains  Most grains. Choose whole wheat and whole grains most of the time. Rice and pasta, including brown rice and pastas made with whole wheat.  Meats and other proteins  Lean, well-trimmed beef, veal, pork, and lamb. Chicken and turkey without skin. All fish and shellfish. Wild duck, rabbit, pheasant, and venison. Egg whites or low-cholesterol egg substitutes. Dried beans, peas, lentils, and tofu. Seeds and most nuts.  Dairy  Low-fat or nonfat cheeses, including ricotta and mozzarella. Skim or 1% milk that is liquid, powdered, or evaporated. Buttermilk that is made with low-fat milk. Nonfat or low-fat yogurt.  Fats and oils  Non-hydrogenated (trans-free) margarines. Vegetable oils, including soybean, sesame, sunflower, olive, peanut, safflower, corn, canola, and cottonseed. Salad dressings or mayonnaise made with a vegetable oil.  Beverages  Mineral water. Coffee and tea. Diet carbonated beverages.  Sweets and desserts  Sherbet, gelatin, and fruit ice. Small amounts of dark chocolate.  Limit all sweets and desserts.  Seasonings and condiments  All seasonings and condiments.  The items listed above may not be a complete list of foods and drinks you can eat. Contact a dietitian for more options.  What foods should I avoid?  Fruits  Canned fruit in heavy syrup. Fruit in cream or butter sauce. Fried fruit. Limit coconut.  Vegetables  Vegetables cooked in cheese, cream, or butter sauce. Fried  vegetables.  Grains  Breads that are made with saturated or trans fats, oils, or whole milk. Croissants. Sweet rolls. Donuts. High-fat crackers, such as cheese crackers.  Meats and other proteins  Fatty meats, such as hot dogs, ribs, sausage, jones, rib-eye roast or steak. High-fat deli meats, such as salami and bologna. Caviar. Domestic duck and goose. Organ meats, such as liver.  Dairy  Cream, sour cream, cream cheese, and creamed cottage cheese. Whole-milk cheeses. Whole or 2% milk that is liquid, evaporated, or condensed. Whole buttermilk. Cream sauce or high-fat cheese sauce. Yogurt that is made from whole milk.  Fats and oils  Meat fat, or shortening. Cocoa butter, hydrogenated oils, palm oil, coconut oil, palm kernel oil. Solid fats and shortenings, including jones fat, salt pork, lard, and butter. Nondairy cream substitutes. Salad dressings with cheese or sour cream.  Beverages  Regular sodas and juice drinks with added sugar.  Sweets and desserts  Frosting. Pudding. Cookies. Cakes. Pies. Milk chocolate or white chocolate. Buttered syrups. Full-fat ice cream or ice cream drinks.  The items listed above may not be a complete list of foods and drinks to avoid. Contact a dietitian for more information.  Summary  · Heart-healthy meal planning includes eating less unhealthy fats, eating more healthy fats, and making other changes in your diet.  · Eat a balanced diet. This includes fruits and vegetables, low-fat or nonfat dairy, lean protein, nuts and legumes, whole grains, and heart-healthy oils and fats.  This information is not intended to replace advice given to you by your health care provider. Make sure you discuss any questions you have with your health care provider.  Document Revised: 02/21/2019 Document Reviewed: 01/25/2019  [employer] Patient Education © 2020 [employer]  Obesity, Adult  Obesity is having too much body fat. Being obese means that your weight is more than what is healthy for you.  BMI is  a number that explains how much body fat you have. If you have a BMI of 30 or more, you are obese. Obesity is often caused by eating or drinking more calories than your body uses. Changing your lifestyle can help you lose weight.  Obesity can cause serious health problems, such as:  · Stroke.  · Coronary artery disease (CAD).  · Type 2 diabetes.  · Some types of cancer, including cancers of the colon, breast, uterus, and gallbladder.  · Osteoarthritis.  · High blood pressure (hypertension).  · High cholesterol.  · Sleep apnea.  · Gallbladder stones.  · Infertility problems.  What are the causes?  · Eating meals each day that are high in calories, sugar, and fat.  · Being born with genes that may make you more likely to become obese.  · Having a medical condition that causes obesity.  · Taking certain medicines.  · Sitting a lot (having a sedentary lifestyle).  · Not getting enough sleep.  · Drinking a lot of drinks that have sugar in them.  What increases the risk?  · Having a family history of obesity.  · Being an  woman.  · Being a  man.  · Living in an area with limited access to:  ? [place], recreation centers, or sidewalks.  ? Healthy food choices, such as grocery stores and farmers' markets.  What are the signs or symptoms?  The main sign is having too much body fat.  How is this treated?  · Treatment for this condition often includes changing your lifestyle. Treatment may include:  ? Changing your diet. This may include making a healthy meal plan.  ? Exercise. This may include activity that causes your heart to beat faster (aerobic exercise) and strength training. Work with your doctor to design a program that works for you.  ? Medicine to help you lose weight. This may be used if you are not able to lose 1 pound a week after 6 weeks of healthy eating and more exercise.  ? Treating conditions that cause the obesity.  ? Surgery. Options may include gastric banding and gastric bypass. This  may be done if:  § Other treatments have not helped to improve your condition.  § You have a BMI of 40 or higher.  § You have life-threatening health problems related to obesity.  Follow these instructions at home:  Eating and drinking    · Follow advice from your doctor about what to eat and drink. Your doctor may tell you to:  ? Limit fast food, sweets, and processed snack foods.  ? Choose low-fat options. For example, choose low-fat milk instead of whole milk.  ? Eat 5 or more servings of fruits or vegetables each day.  ? Eat at home more often. This gives you more control over what you eat.  ? Choose healthy foods when you eat out.  ? Learn to read food labels. This will help you learn how much food is in 1 serving.  ? Keep low-fat snacks available.  ? Avoid drinks that have a lot of sugar in them. These include soda, fruit juice, iced tea with sugar, and flavored milk.  · Drink enough water to keep your pee (urine) pale yellow.  · Do not go on fad diets.  Physical activity  · Exercise often, as told by your doctor. Most adults should get up to 150 minutes of moderate-intensity exercise every week.Ask your doctor:  ? What types of exercise are safe for you.  ? How often you should exercise.  · Warm up and stretch before being active.  · Do slow stretching after being active (cool down).  · Rest between times of being active.  Lifestyle  · Work with your doctor and a food expert (dietitian) to set a weight-loss goal that is best for you.  · Limit your screen time.  · Find ways to reward yourself that do not involve food.  · Do not drink alcohol if:  ? Your doctor tells you not to drink.  ? You are pregnant, may be pregnant, or are planning to become pregnant.  · If you drink alcohol:  ? Limit how much you use to:  § 0-1 drink a day for women.  § 0-2 drinks a day for men.  ? Be aware of how much alcohol is in your drink. In the [place] one drink equals one 12 oz bottle of beer (355 mL), one 5 oz glass of wine (148  mL), or one 1½ oz glass of hard liquor (44 mL).  General instructions  · Keep a weight-loss journal. This can help you keep track of:  ? The food that you eat.  ? How much exercise you get.  · Take over-the-counter and prescription medicines only as told by your doctor.  · Take vitamins and supplements only as told by your doctor.  · Think about joining a support group.  · Keep all follow-up visits as told by your doctor. This is important.  Contact a doctor if:  · You cannot meet your weight loss goal after you have changed your diet and lifestyle for 6 weeks.  Get help right away if you:  · Are having trouble breathing.  · Are having thoughts of harming yourself.  Summary  · Obesity is having too much body fat.  · Being obese means that your weight is more than what is healthy for you.  · Work with your doctor to set a weight-loss goal.  · Get regular exercise as told by your doctor.  This information is not intended to replace advice given to you by your health care provider. Make sure you discuss any questions you have with your health care provider.  Document Revised: 08/22/2019 Document Reviewed: 08/22/2019  [employer] Patient Education © 2020 [employer]

## 2021-01-04 NOTE — PROGRESS NOTES
Subjective:     Encounter Date:01/05/2021      Patient ID: Lio Velasquez is a 65 y.o. male   HPI: This patient presents today for routine follow-up of medication adjustments.  He was started on Ranexa 500 twice daily at his last office on 12/9/2020.  He reports decrease in severity and frequency of chest pain since that time. He has a history of coronary artery disease status post CABG x2 (LIMA to LAD and SVG to RCA) with subsequent 3.25 x 38 mm stent to the saphenous vein graft to the right coronary artery on 7/8/2016 and 3.5 x 38 mm Jamee drug-eluting stent to the mid saphenous vein graft to the right coronary artery and 3.5 x 23 mm Jamee drug-eluting stent to the distal saphenous vein graft to the right coronary artery 8/12/2019, hypertension, hyperlipidemia, obstructive sleep apnea and obesity.Left heart catheterization on 10/4/2020 revealed widely patent previously placed proximal LAD stent, 100% stenosis of mid LAD, 100% stenosis of proximal RCA, patent LIMA to the LAD and complete occlusion of the saphenous vein graft to the right coronary artery with vessel to vessel collaterals noted from the proximal to distal right coronary artery as well as the obtuse marginal branch.  As saphenous vein graft to the right coronary artery was 100% occluded, no PCI was performed. He continues to complain of sharp, exertional, substernal chest pain that does not radiate. The pain is associated with shortness of breath and is relieved with rest. He reports that pain occurs with minimal exertion. Patient denies palpitations, dizziness, syncope, orthopnea, PND, edema or decreased stamina.  Patient denies any signs of bleeding.    Chief Complaint: Routine follow-up  Coronary Artery Disease  Presents for follow-up visit. Symptoms include chest pain and shortness of breath. Pertinent negatives include no chest pressure, chest tightness, dizziness, leg swelling, muscle weakness, palpitations or weight gain. Risk factors include  hyperlipidemia and obesity. The symptoms have been stable. Compliance with diet is variable. Compliance with exercise is variable. Compliance with medications is good.   Hypertension  This is a chronic problem. The current episode started more than 1 year ago. The problem is controlled. Associated symptoms include chest pain and shortness of breath. Pertinent negatives include no anxiety, blurred vision, headaches, malaise/fatigue, neck pain, orthopnea, palpitations, peripheral edema, PND or sweats. Risk factors for coronary artery disease include male gender, obesity and dyslipidemia. Current antihypertension treatment includes beta blockers and ACE inhibitors. The current treatment provides significant improvement. Hypertensive end-organ damage includes CAD/MI.   Hyperlipidemia  This is a chronic problem. The current episode started more than 1 year ago. Exacerbating diseases include obesity. Associated symptoms include chest pain and shortness of breath. Current antihyperlipidemic treatment includes statins. Risk factors for coronary artery disease include hypertension, male sex, obesity and dyslipidemia.       Previous Cardiac Testing:  Results for orders placed during the hospital encounter of 10/03/20   Adult Transthoracic Echo Complete W/ Cont if Necessary Per Protocol    Narrative · Left ventricular ejection fraction appears to be 56 - 60%. Left   ventricular systolic function is normal.  · Normal diastolic function  · No pulmonary hypertension        Results for orders placed during the hospital encounter of 10/03/20   Cardiac Catheterization/Vascular Study    Narrative · Mid LAD lesion is 100% stenosed.  · Prox RCA lesion is 100% stenosed. Vessel two-vessel collaterals noted   from proximal to distal right coronary arteryAlso collaterals noted to   obtuse marginal branch  · Origin to Prox Graft lesion is 100% stenosed.  · Mild systolic dysfunction.  · The ejection fraction was 40-50% by visual estimate.  ·  No mitral valve regurgitation.  · No aortic valve stenosis.  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    Allergies   Allergen Reactions   • Demerol [Meperidine]    • Morphine And Related Itching       Current Outpatient Medications:   •  aspirin 81 MG tablet, Take 81 mg by mouth every night., Disp: , Rfl:   •  folic acid-pyridoxine-cyanocobalamin (FOLTX) 2.5-25-2 MG tablet tablet, Take 1 tablet by mouth every night., Disp: , Rfl:   •  isosorbide mononitrate (IMDUR) 120 MG 24 hr tablet, Take 1 tablet by mouth Daily., Disp: 90 tablet, Rfl: 3  •  metoprolol succinate XL (TOPROL-XL) 50 MG 24 hr tablet, Take 50 mg by mouth every night., Disp: , Rfl:   •  Multiple Vitamins-Minerals (CENTRUM CARDIO PO), Take 1 tablet by mouth Every Night., Disp: , Rfl:   •  nitroglycerin (NITROSTAT) 0.4 MG SL tablet, 1 under the tongue as needed for angina, may repeat q5mins for up three doses, Disp: 100 tablet, Rfl: 11  •  ramipril (ALTACE) 2.5 MG capsule, Take 2.5 mg by mouth every night., Disp: , Rfl:   •  ranolazine (RANEXA) 1000 MG 12 hr tablet, Take 1 tablet by mouth 2 (Two) Times a Day., Disp: 60 tablet, Rfl: 11  •  rosuvastatin (CRESTOR) 10 MG tablet, Take 1 tablet by mouth Every Night., Disp: 90 tablet, Rfl: 3  •  ticagrelor (BRILINTA) 90 MG tablet tablet, Take 1 tablet by mouth Every 12 (Twelve) Hours., Disp: 180 tablet, Rfl: 3  •  Vortioxetine HBr (TRINTELLIX) 20 MG tablet, Take 20 mg by mouth Daily., Disp: , Rfl:   Past Medical History:   Diagnosis Date   • Aortocoronary bypass status    • CAD in native artery    • Chest pain    • Coronary angioplasty status    •  Hyperlipidemia     unspecified   • Hypertension, benign    • Obesity    • Sleep apnea     unspecified     Past Surgical History:   Procedure Laterality Date   • ANKLE SURGERY      with screws    • CARDIAC CATHETERIZATION Left 03/05/2012 7/2016   • CARDIAC CATHETERIZATION Left 8/12/2019    Procedure: Cardiac Catheterization/Vascular Study BRIANNA OK;  Surgeon: Storm Encinas MD;  Location:  [facility];  Service: Cardiology   • CARDIAC CATHETERIZATION Left 10/4/2020    Procedure: Cardiac Catheterization/Vascular Study 6F Right groin ;  Surgeon: Storm Encinas MD;  Location:  [facility];  Service: Cardiology;  Laterality: Left;   • COLONOSCOPY  12/30/2008   • COLONOSCOPY  12/30/2013    two polyps removed from the sigmoid colon   • COLONOSCOPY N/A 6/21/2019    Procedure: COLONOSCOPY WITH ANESTHESIA;  Surgeon: Marciano Jaramillo DO;  Location:  [facility];  Service: Gastroenterology   • CORONARY ARTERY BYPASS GRAFT  1993    x2   • CORONARY STENT PLACEMENT  2019    X 3   • JOINT REPLACEMENT Left     left ankle   • SHOULDER SURGERY Right     with chano   • WRIST SURGERY Left      Family History   Problem Relation Age of Onset   • Diabetes Mother         type II   • Heart failure Mother         congestive heart failure   • Colon polyps Neg Hx    • Esophageal cancer Neg Hx    • Rectal cancer Neg Hx    • Colon cancer Neg Hx      Social History     Socioeconomic History   • Marital status:      Spouse name: Not on file   • Number of children: Not on file   • Years of education: Not on file   • Highest education level: Not on file   Tobacco Use   • Smoking status: Never Smoker   • Smokeless tobacco: Never Used   Substance and Sexual Activity   • Alcohol use: Not Currently     Comment: occasional   • Drug use: No   • Sexual activity: Defer       Review of Systems   Constitution: Negative for chills, decreased appetite, fever, malaise/fatigue, night sweats, weight gain and weight loss.    HENT: Negative for nosebleeds.    Eyes: Negative for blurred vision and visual disturbance.   Cardiovascular: Positive for chest pain. Negative for dyspnea on exertion, leg swelling, near-syncope, orthopnea, palpitations, paroxysmal nocturnal dyspnea and syncope.   Respiratory: Positive for shortness of breath. Negative for chest tightness, cough, hemoptysis, snoring and wheezing.    Endocrine: Negative for cold intolerance and heat intolerance.   Hematologic/Lymphatic: Does not bruise/bleed easily.   Skin: Negative for rash.   Musculoskeletal: Negative for back pain, falls, muscle weakness and neck pain.   Gastrointestinal: Negative for abdominal pain, change in bowel habit, constipation, diarrhea, dysphagia, heartburn, nausea and vomiting.   Genitourinary: Negative for hematuria.   Neurological: Negative for dizziness, headaches, light-headedness and weakness.   Psychiatric/Behavioral: Negative for altered mental status.   Allergic/Immunologic: Negative for persistent infections.              Objective:     Vitals signs and nursing note reviewed.   Constitutional:       General: Awake.      Appearance: Normal and healthy appearance. Well-developed, normal weight and not in distress.   Eyes:      General: Lids are normal.      Conjunctiva/sclera: Conjunctivae normal.      Pupils: Pupils are equal, round, and reactive to light.   HENT:      Head: Normocephalic and atraumatic.      Nose: Nose normal.   Neck:      Musculoskeletal: Normal range of motion.      Vascular: No JVR. JVD normal.   Pulmonary:      Effort: Pulmonary effort is normal.      Breath sounds: Normal breath sounds. No wheezing. No rhonchi. No rales.   Chest:      Chest wall: Not tender to palpatation.   Cardiovascular:      PMI at left midclavicular line. Normal rate. Regular rhythm. Normal S1. Normal S2.      Murmurs: There is no murmur.      No gallop. No click. No rub.   Pulses:     Intact distal pulses.   Edema:     Peripheral edema absent.  "  Abdominal:      General: Bowel sounds are normal.      Palpations: Abdomen is soft.      Tenderness: There is no abdominal tenderness.   Musculoskeletal: Normal range of motion.         General: No tenderness.   Skin:     General: Skin is warm and dry.   Neurological:      General: No focal deficit present.      Mental Status: Alert, oriented to person, place, and time and oriented to person, place and time.   Psychiatric:         Attention and Perception: Attention and perception normal.         Mood and Affect: Mood and affect normal.         Speech: Speech normal.         Behavior: Behavior normal. Behavior is cooperative.         Thought Content: Thought content normal.         Cognition and Memory: Cognition and memory normal.         Judgment: Judgment normal.           Procedures  /66   Pulse 62   Ht 182.9 cm (72\")   Wt 117 kg (257 lb)   SpO2 98%   BMI 34.86 kg/m²   Lab Review:   Lab Results   Component Value Date    WBC 7.10 10/05/2020    HGB 13.4 10/05/2020    HCT 38.6 10/05/2020    MCV 91.7 10/05/2020     10/05/2020     Lab Results   Component Value Date    GLUCOSE 133 (H) 10/05/2020    BUN 11 10/05/2020    CREATININE 0.77 10/05/2020    EGFRIFNONA 102 10/05/2020    BCR 14.3 10/05/2020    K 3.8 10/05/2020    CO2 24.0 10/05/2020    CALCIUM 8.9 10/05/2020    ALBUMIN 4.50 10/03/2020    AST 30 10/03/2020    ALT 25 10/03/2020     Lab Results   Component Value Date    CHOL 119 10/04/2020    CHOL 129 (L) 08/13/2019    CHLPL 143 07/07/2016     Lab Results   Component Value Date    TRIG 192 (H) 10/04/2020    TRIG 180 (H) 08/13/2019    TRIG 180 (H) 07/07/2016     Lab Results   Component Value Date    HDL 28 (L) 10/04/2020    HDL 27 (L) 08/13/2019    HDL 31 07/07/2016     Lab Results   Component Value Date    LDL 53 10/04/2020    LDL 87 08/13/2019    LDL 93 07/07/2016       I have reviewed the most recent lab results.       Assessment:          Diagnosis Plan   1. Coronary artery disease of bypass " graft of native heart with stable angina pectoris (CMS/[facility])  Increase Ranexa to 1000 mg twice daily.  Referral to Dr. Mirza Loaiza with [place] [facility] per pt request.    2. S/P CABG x 2  LIMA to LAD and SVG to RCA (occluded) 1993   3. Stented coronary artery   Continues on aspirin and Brilinta. Denies bleeding.    4. Presence of coronary artery bypass graft stent  SVG to RCA X3 most recent 8/12/19   5. Essential hypertension  Well controlled.    6. Mixed hyperlipidemia  Managed by PCP. On statin. Well controlled.    7. Obstructive sleep apnea on CPAP  Compliant.    8. Class 1 obesity due to excess calories with serious comorbidity and body mass index (BMI) of 34.0 to 34.9 in adult  Patient's Body mass index is 34.86 kg/m². BMI is above normal parameters. Recommendations include: educational material.            Plan:         1.  Increase Ranexa to 1000 mg twice daily. Continue other medications as previously prescribed.  2. Report any worsening symptoms.  3. Report any signs of bleeding.  4. Continue heart healthy diet and regular exercise as tolerated.   5. Follow up with PCP for blood pressure and cholesterol management and routine lab work.  6. Follow up in four weeks, or sooner if needed.   7. Referral to Dr. Mirza Loaiza with [place] [facility] per pt request.

## 2021-01-05 ENCOUNTER — OFFICE VISIT (OUTPATIENT)
Dept: CARDIOLOGY | Facility: CLINIC | Age: 66
End: 2021-01-05

## 2021-01-05 VITALS
OXYGEN SATURATION: 98 % | HEART RATE: 62 BPM | HEIGHT: 72 IN | WEIGHT: 257 LBS | BODY MASS INDEX: 34.81 KG/M2 | DIASTOLIC BLOOD PRESSURE: 66 MMHG | SYSTOLIC BLOOD PRESSURE: 108 MMHG

## 2021-01-05 DIAGNOSIS — I25.708 CORONARY ARTERY DISEASE OF BYPASS GRAFT OF NATIVE HEART WITH STABLE ANGINA PECTORIS (HCC): Primary | Chronic | ICD-10-CM

## 2021-01-05 DIAGNOSIS — Z95.1 S/P CABG X 2: ICD-10-CM

## 2021-01-05 DIAGNOSIS — Z99.89 OBSTRUCTIVE SLEEP APNEA ON CPAP: Chronic | ICD-10-CM

## 2021-01-05 DIAGNOSIS — E66.09 CLASS 1 OBESITY DUE TO EXCESS CALORIES WITH SERIOUS COMORBIDITY AND BODY MASS INDEX (BMI) OF 34.0 TO 34.9 IN ADULT: ICD-10-CM

## 2021-01-05 DIAGNOSIS — G47.33 OBSTRUCTIVE SLEEP APNEA ON CPAP: Chronic | ICD-10-CM

## 2021-01-05 DIAGNOSIS — I10 ESSENTIAL HYPERTENSION: Chronic | ICD-10-CM

## 2021-01-05 DIAGNOSIS — Z95.1 PRESENCE OF CORONARY ARTERY BYPASS GRAFT STENT: ICD-10-CM

## 2021-01-05 DIAGNOSIS — Z95.5 PRESENCE OF CORONARY ARTERY BYPASS GRAFT STENT: ICD-10-CM

## 2021-01-05 DIAGNOSIS — E78.2 MIXED HYPERLIPIDEMIA: Chronic | ICD-10-CM

## 2021-01-05 DIAGNOSIS — Z95.5 STENTED CORONARY ARTERY: ICD-10-CM

## 2021-01-05 PROCEDURE — 99214 OFFICE O/P EST MOD 30 MIN: CPT | Performed by: NURSE PRACTITIONER

## 2021-01-05 RX ORDER — RANOLAZINE 1000 MG/1
1000 TABLET, EXTENDED RELEASE ORAL 2 TIMES DAILY
Qty: 60 TABLET | Refills: 11 | Status: ON HOLD | OUTPATIENT
Start: 2021-01-05 | End: 2022-04-11

## 2021-02-05 NOTE — PATIENT INSTRUCTIONS
Exercising to Lose Weight  Exercise is structured, repetitive physical activity to improve fitness and health. Getting regular exercise is important for everyone. It is especially important if you are overweight. Being overweight increases your risk of heart disease, stroke, diabetes, high blood pressure, and several types of cancer. Reducing your calorie intake and exercising can help you lose weight.  Exercise is usually categorized as moderate or vigorous intensity. To lose weight, most people need to do a certain amount of moderate-intensity or vigorous-intensity exercise each week.  Moderate-intensity exercise    Moderate-intensity exercise is any activity that gets you moving enough to burn at least three times more energy (calories) than if you were sitting.  Examples of moderate exercise include:  · Walking a mile in 15 minutes.  · Doing light yard work.  · Biking at an easy pace.  Most people should get at least 150 minutes (2 hours and 30 minutes) a week of moderate-intensity exercise to maintain their body weight.  Vigorous-intensity exercise  Vigorous-intensity exercise is any activity that gets you moving enough to burn at least six times more calories than if you were sitting. When you exercise at this intensity, you should be working hard enough that you are not able to carry on a conversation.  Examples of vigorous exercise include:  · Running.  · Playing a team sport, such as football, basketball, and soccer.  · Jumping rope.  Most people should get at least 75 minutes (1 hour and 15 minutes) a week of vigorous-intensity exercise to maintain their body weight.  How can exercise affect me?  When you exercise enough to burn more calories than you eat, you lose weight. Exercise also reduces body fat and builds muscle. The more muscle you have, the more calories you burn. Exercise also:  · Improves mood.  · Reduces stress and tension.  · Improves your overall fitness, flexibility, and  endurance.  · Increases bone strength.  The amount of exercise you need to lose weight depends on:  · Your age.  · The type of exercise.  · Any health conditions you have.  · Your overall physical ability.  Talk to your health care provider about how much exercise you need and what types of activities are safe for you.  What actions can I take to lose weight?  Nutrition    · Make changes to your diet as told by your health care provider or diet and nutrition specialist (dietitian). This may include:  ? Eating fewer calories.  ? Eating more protein.  ? Eating less unhealthy fats.  ? Eating a diet that includes fresh fruits and vegetables, whole grains, low-fat dairy products, and lean protein.  ? Avoiding foods with added fat, salt, and sugar.  · Drink plenty of water while you exercise to prevent dehydration or heat stroke.  Activity  · Choose an activity that you enjoy and set realistic goals. Your health care provider can help you make an exercise plan that works for you.  · Exercise at a moderate or vigorous intensity most days of the week.  ? The intensity of exercise may vary from person to person. You can tell how intense a workout is for you by paying attention to your breathing and heartbeat. Most people will notice their breathing and heartbeat get faster with more intense exercise.  · Do resistance training twice each week, such as:  ? Push-ups.  ? Sit-ups.  ? Lifting weights.  ? Using resistance bands.  · Getting short amounts of exercise can be just as helpful as long structured periods of exercise. If you have trouble finding time to exercise, try to include exercise in your daily routine.  ? Get up, stretch, and walk around every 30 minutes throughout the day.  ? Go for a walk during your lunch break.  ? Park your car farther away from your destination.  ? If you take public transportation, get off one stop early and walk the rest of the way.  ? Make phone calls while standing up and walking  around.  ? Take the stairs instead of elevators or escalators.  · Wear comfortable clothes and shoes with good support.  · Do not exercise so much that you hurt yourself, feel dizzy, or get very short of breath.  Where to find more information  · [place] Department of Health and Human Services: www.hhs.gov  · Centers for Disease Control and Prevention (CDC): www.cdc.gov  Contact a health care provider:  · Before starting a new exercise program.  · If you have questions or concerns about your weight.  · If you have a medical problem that keeps you from exercising.  Get help right away if you have any of the following while exercising:  · Injury.  · Dizziness.  · Difficulty breathing or shortness of breath that does not go away when you stop exercising.  · Chest pain.  · Rapid heartbeat.  Summary  · Being overweight increases your risk of heart disease, stroke, diabetes, high blood pressure, and several types of cancer.  · Losing weight happens when you burn more calories than you eat.  · Reducing the amount of calories you eat in addition to getting regular moderate or vigorous exercise each week helps you lose weight.  This information is not intended to replace advice given to you by your health care provider. Make sure you discuss any questions you have with your health care provider.  Document Revised: 12/31/2018 Document Reviewed: 12/31/2018  [employer] Patient Education © 2020 [employer]  Heart-Healthy Eating Plan  Heart-healthy meal planning includes:  · Eating less unhealthy fats.  · Eating more healthy fats.  · Making other changes in your diet.  Talk with your doctor or a diet specialist (dietitian) to create an eating plan that is right for you.  What is my plan?  Your doctor may recommend an eating plan that includes:  · Total fat: ______% or less of total calories a day.  · Saturated fat: ______% or less of total calories a day.  · Cholesterol: less than _________mg a day.  What are tips for following this  plan?  Cooking  Avoid frying your food. Try to bake, boil, grill, or broil it instead. You can also reduce fat by:  · Removing the skin from poultry.  · Removing all visible fats from meats.  · Steaming vegetables in water or broth.  Meal planning    · At meals, divide your plate into four equal parts:  ? Fill one-half of your plate with vegetables and green salads.  ? Fill one-fourth of your plate with whole grains.  ? Fill one-fourth of your plate with lean protein foods.  · Eat 4-5 servings of vegetables per day. A serving of vegetables is:  ? 1 cup of raw or cooked vegetables.  ? 2 cups of raw leafy greens.  · Eat 4-5 servings of fruit per day. A serving of fruit is:  ? 1 medium whole fruit.  ? ¼ cup of dried fruit.  ? ½ cup of fresh, frozen, or canned fruit.  ? ½ cup of 100% fruit juice.  · Eat more foods that have soluble fiber. These are apples, broccoli, carrots, beans, peas, and barley. Try to get 20-30 g of fiber per day.  · Eat 4-5 servings of nuts, legumes, and seeds per week:  ? 1 serving of dried beans or legumes equals ½ cup after being cooked.  ? 1 serving of nuts is ¼ cup.  ? 1 serving of seeds equals 1 tablespoon.  General information  · Eat more home-cooked food. Eat less restaurant, buffet, and fast food.  · Limit or avoid alcohol.  · Limit foods that are high in starch and sugar.  · Avoid fried foods.  · Lose weight if you are overweight.  · Keep track of how much salt (sodium) you eat. This is important if you have high blood pressure. Ask your doctor to tell you more about this.  · Try to add vegetarian meals each week.  Fats  · Choose healthy fats. These include olive oil and canola oil, flaxseeds, walnuts, almonds, and seeds.  · Eat more omega-3 fats. These include salmon, mackerel, sardines, tuna, flaxseed oil, and ground flaxseeds. Try to eat fish at least 2 times each week.  · Check food labels. Avoid foods with trans fats or high amounts of saturated fat.  · Limit saturated  fats.  ? These are often found in animal products, such as meats, butter, and cream.  ? These are also found in plant foods, such as palm oil, palm kernel oil, and coconut oil.  · Avoid foods with partially hydrogenated oils in them. These have trans fats. Examples are stick margarine, some tub margarines, cookies, crackers, and other baked goods.  What foods can I eat?  Fruits  All fresh, canned (in natural juice), or frozen fruits.  Vegetables  Fresh or frozen vegetables (raw, steamed, roasted, or grilled). Green salads.  Grains  Most grains. Choose whole wheat and whole grains most of the time. Rice and pasta, including brown rice and pastas made with whole wheat.  Meats and other proteins  Lean, well-trimmed beef, veal, pork, and lamb. Chicken and turkey without skin. All fish and shellfish. Wild duck, rabbit, pheasant, and venison. Egg whites or low-cholesterol egg substitutes. Dried beans, peas, lentils, and tofu. Seeds and most nuts.  Dairy  Low-fat or nonfat cheeses, including ricotta and mozzarella. Skim or 1% milk that is liquid, powdered, or evaporated. Buttermilk that is made with low-fat milk. Nonfat or low-fat yogurt.  Fats and oils  Non-hydrogenated (trans-free) margarines. Vegetable oils, including soybean, sesame, sunflower, olive, peanut, safflower, corn, canola, and cottonseed. Salad dressings or mayonnaise made with a vegetable oil.  Beverages  Mineral water. Coffee and tea. Diet carbonated beverages.  Sweets and desserts  Sherbet, gelatin, and fruit ice. Small amounts of dark chocolate.  Limit all sweets and desserts.  Seasonings and condiments  All seasonings and condiments.  The items listed above may not be a complete list of foods and drinks you can eat. Contact a dietitian for more options.  What foods should I avoid?  Fruits  Canned fruit in heavy syrup. Fruit in cream or butter sauce. Fried fruit. Limit coconut.  Vegetables  Vegetables cooked in cheese, cream, or butter sauce. Fried  vegetables.  Grains  Breads that are made with saturated or trans fats, oils, or whole milk. Croissants. Sweet rolls. Donuts. High-fat crackers, such as cheese crackers.  Meats and other proteins  Fatty meats, such as hot dogs, ribs, sausage, jones, rib-eye roast or steak. High-fat deli meats, such as salami and bologna. Caviar. Domestic duck and goose. Organ meats, such as liver.  Dairy  Cream, sour cream, cream cheese, and creamed cottage cheese. Whole-milk cheeses. Whole or 2% milk that is liquid, evaporated, or condensed. Whole buttermilk. Cream sauce or high-fat cheese sauce. Yogurt that is made from whole milk.  Fats and oils  Meat fat, or shortening. Cocoa butter, hydrogenated oils, palm oil, coconut oil, palm kernel oil. Solid fats and shortenings, including jones fat, salt pork, lard, and butter. Nondairy cream substitutes. Salad dressings with cheese or sour cream.  Beverages  Regular sodas and juice drinks with added sugar.  Sweets and desserts  Frosting. Pudding. Cookies. Cakes. Pies. Milk chocolate or white chocolate. Buttered syrups. Full-fat ice cream or ice cream drinks.  The items listed above may not be a complete list of foods and drinks to avoid. Contact a dietitian for more information.  Summary  · Heart-healthy meal planning includes eating less unhealthy fats, eating more healthy fats, and making other changes in your diet.  · Eat a balanced diet. This includes fruits and vegetables, low-fat or nonfat dairy, lean protein, nuts and legumes, whole grains, and heart-healthy oils and fats.  This information is not intended to replace advice given to you by your health care provider. Make sure you discuss any questions you have with your health care provider.  Document Revised: 02/21/2019 Document Reviewed: 01/25/2019  [employer] Patient Education © 2020 [employer]  Obesity, Adult  Obesity is having too much body fat. Being obese means that your weight is more than what is healthy for you.  BMI is  a number that explains how much body fat you have. If you have a BMI of 30 or more, you are obese. Obesity is often caused by eating or drinking more calories than your body uses. Changing your lifestyle can help you lose weight.  Obesity can cause serious health problems, such as:  · Stroke.  · Coronary artery disease (CAD).  · Type 2 diabetes.  · Some types of cancer, including cancers of the colon, breast, uterus, and gallbladder.  · Osteoarthritis.  · High blood pressure (hypertension).  · High cholesterol.  · Sleep apnea.  · Gallbladder stones.  · Infertility problems.  What are the causes?  · Eating meals each day that are high in calories, sugar, and fat.  · Being born with genes that may make you more likely to become obese.  · Having a medical condition that causes obesity.  · Taking certain medicines.  · Sitting a lot (having a sedentary lifestyle).  · Not getting enough sleep.  · Drinking a lot of drinks that have sugar in them.  What increases the risk?  · Having a family history of obesity.  · Being an  woman.  · Being a  man.  · Living in an area with limited access to:  ? [place], recreation centers, or sidewalks.  ? Healthy food choices, such as grocery stores and farmers' markets.  What are the signs or symptoms?  The main sign is having too much body fat.  How is this treated?  · Treatment for this condition often includes changing your lifestyle. Treatment may include:  ? Changing your diet. This may include making a healthy meal plan.  ? Exercise. This may include activity that causes your heart to beat faster (aerobic exercise) and strength training. Work with your doctor to design a program that works for you.  ? Medicine to help you lose weight. This may be used if you are not able to lose 1 pound a week after 6 weeks of healthy eating and more exercise.  ? Treating conditions that cause the obesity.  ? Surgery. Options may include gastric banding and gastric bypass. This  may be done if:  § Other treatments have not helped to improve your condition.  § You have a BMI of 40 or higher.  § You have life-threatening health problems related to obesity.  Follow these instructions at home:  Eating and drinking    · Follow advice from your doctor about what to eat and drink. Your doctor may tell you to:  ? Limit fast food, sweets, and processed snack foods.  ? Choose low-fat options. For example, choose low-fat milk instead of whole milk.  ? Eat 5 or more servings of fruits or vegetables each day.  ? Eat at home more often. This gives you more control over what you eat.  ? Choose healthy foods when you eat out.  ? Learn to read food labels. This will help you learn how much food is in 1 serving.  ? Keep low-fat snacks available.  ? Avoid drinks that have a lot of sugar in them. These include soda, fruit juice, iced tea with sugar, and flavored milk.  · Drink enough water to keep your pee (urine) pale yellow.  · Do not go on fad diets.  Physical activity  · Exercise often, as told by your doctor. Most adults should get up to 150 minutes of moderate-intensity exercise every week.Ask your doctor:  ? What types of exercise are safe for you.  ? How often you should exercise.  · Warm up and stretch before being active.  · Do slow stretching after being active (cool down).  · Rest between times of being active.  Lifestyle  · Work with your doctor and a food expert (dietitian) to set a weight-loss goal that is best for you.  · Limit your screen time.  · Find ways to reward yourself that do not involve food.  · Do not drink alcohol if:  ? Your doctor tells you not to drink.  ? You are pregnant, may be pregnant, or are planning to become pregnant.  · If you drink alcohol:  ? Limit how much you use to:  § 0-1 drink a day for women.  § 0-2 drinks a day for men.  ? Be aware of how much alcohol is in your drink. In the [place] one drink equals one 12 oz bottle of beer (355 mL), one 5 oz glass of wine (148  mL), or one 1½ oz glass of hard liquor (44 mL).  General instructions  · Keep a weight-loss journal. This can help you keep track of:  ? The food that you eat.  ? How much exercise you get.  · Take over-the-counter and prescription medicines only as told by your doctor.  · Take vitamins and supplements only as told by your doctor.  · Think about joining a support group.  · Keep all follow-up visits as told by your doctor. This is important.  Contact a doctor if:  · You cannot meet your weight loss goal after you have changed your diet and lifestyle for 6 weeks.  Get help right away if you:  · Are having trouble breathing.  · Are having thoughts of harming yourself.  Summary  · Obesity is having too much body fat.  · Being obese means that your weight is more than what is healthy for you.  · Work with your doctor to set a weight-loss goal.  · Get regular exercise as told by your doctor.  This information is not intended to replace advice given to you by your health care provider. Make sure you discuss any questions you have with your health care provider.  Document Revised: 08/22/2019 Document Reviewed: 08/22/2019  [employer] Patient Education © 2020 [employer]

## 2021-02-05 NOTE — PROGRESS NOTES
Subjective:     Encounter Date:02/08/2021      Patient ID: Lio Velasquez is a 65 y.o. male   HPI: This patient presents today for routine follow-up of medication adjustments. Ranexa was increased to 1000 mg twice daily at his last office on 01/5/2021. He complains of constipation, increased flatus, dark colored urine and polydipsia since that time. He was referred to Dr. Mirza Loaiza with cardiology at [place] per his request for evaluation for potential PCI. He is scheduled to see Dr. Loaiza 04/07/21. He has a history of coronary artery disease status post CABG x2 (LIMA to LAD and SVG to RCA) with subsequent 3.25 x 38 mm stent to the saphenous vein graft to the right coronary artery on 7/8/2016 and 3.5 x 38 mm Jamee drug-eluting stent to the mid saphenous vein graft to the right coronary artery and 3.5 x 23 mm Jamee drug-eluting stent to the distal saphenous vein graft to the right coronary artery 8/12/2019, hypertension, hyperlipidemia, obstructive sleep apnea and obesity.Left heart catheterization on 10/4/2020 revealed widely patent previously placed proximal LAD stent, 100% stenosis of mid LAD, 100% stenosis of proximal RCA, patent LIMA to the LAD and complete occlusion of the saphenous vein graft to the right coronary artery with vessel to vessel collaterals noted from the proximal to distal right coronary artery as well as the obtuse marginal branch.  As saphenous vein graft to the right coronary artery was 100% occluded, no PCI was performed. He continues to complain of sharp, exertional, substernal chest pain that does not radiate. The pain is associated with shortness of breath and is relieved with rest. He reports that pain occurs with minimal exertion. Patient denies palpitations, dizziness, syncope, orthopnea, PND, edema or decreased stamina.  Patient denies any signs of bleeding.    Chief Complaint: Routine follow-up  Coronary Artery Disease  Presents for follow-up visit. Symptoms include chest pain and  shortness of breath. Pertinent negatives include no chest pressure, chest tightness, dizziness, leg swelling, muscle weakness, palpitations or weight gain. Risk factors include hyperlipidemia and obesity. The symptoms have been stable. Compliance with diet is variable. Compliance with exercise is variable. Compliance with medications is good.   Hypertension  This is a chronic problem. The current episode started more than 1 year ago. The problem is controlled. Associated symptoms include chest pain and shortness of breath. Pertinent negatives include no anxiety, blurred vision, headaches, malaise/fatigue, neck pain, orthopnea, palpitations, peripheral edema, PND or sweats. Risk factors for coronary artery disease include male gender, obesity and dyslipidemia. Current antihypertension treatment includes beta blockers and ACE inhibitors. The current treatment provides significant improvement. Hypertensive end-organ damage includes CAD/MI.   Hyperlipidemia  This is a chronic problem. The current episode started more than 1 year ago. Exacerbating diseases include obesity. Associated symptoms include chest pain and shortness of breath. Current antihyperlipidemic treatment includes statins. Risk factors for coronary artery disease include hypertension, male sex, obesity and dyslipidemia.       Previous Cardiac Testing:  Results for orders placed during the hospital encounter of 10/03/20   Adult Transthoracic Echo Complete W/ Cont if Necessary Per Protocol    Narrative · Left ventricular ejection fraction appears to be 56 - 60%. Left   ventricular systolic function is normal.  · Normal diastolic function  · No pulmonary hypertension        Results for orders placed during the hospital encounter of 10/03/20   Cardiac Catheterization/Vascular Study    Narrative · Mid LAD lesion is 100% stenosed.  · Prox RCA lesion is 100% stenosed. Vessel two-vessel collaterals noted   from proximal to distal right coronary arteryAlso  collaterals noted to   obtuse marginal branch  · Origin to Prox Graft lesion is 100% stenosed.  · Mild systolic dysfunction.  · The ejection fraction was 40-50% by visual estimate.  · No mitral valve regurgitation.  · No aortic valve stenosis.  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    Allergies   Allergen Reactions   • Demerol [Meperidine]    • Morphine And Related Itching       Current Outpatient Medications:   •  aspirin 81 MG tablet, Take 81 mg by mouth every night., Disp: , Rfl:   •  folic acid-pyridoxine-cyanocobalamin (FOLTX) 2.5-25-2 MG tablet tablet, Take 1 tablet by mouth every night., Disp: , Rfl:   •  isosorbide mononitrate (IMDUR) 120 MG 24 hr tablet, Take 1 tablet by mouth Daily., Disp: 90 tablet, Rfl: 3  •  metoprolol succinate XL (TOPROL-XL) 50 MG 24 hr tablet, Take 50 mg by mouth every night., Disp: , Rfl:   •  Multiple Vitamins-Minerals (CENTRUM CARDIO PO), Take 1 tablet by mouth Every Night., Disp: , Rfl:   •  nitroglycerin (NITROSTAT) 0.4 MG SL tablet, 1 under the tongue as needed for angina, may repeat q5mins for up three doses, Disp: 100 tablet, Rfl: 11  •  ramipril (ALTACE) 2.5 MG capsule, Take 2.5 mg by mouth every night., Disp: , Rfl:   •  ranolazine (RANEXA) 1000 MG 12 hr tablet, Take 1 tablet by mouth 2 (Two) Times a Day., Disp: 60 tablet, Rfl: 11  •  rosuvastatin (CRESTOR) 10 MG tablet, Take 1 tablet by mouth Every Night., Disp: 90 tablet, Rfl: 3  •  ticagrelor (BRILINTA) 90 MG tablet tablet, Take 1 tablet by mouth Every 12 (Twelve) Hours., Disp: 180 tablet, Rfl: 3  •  Vortioxetine HBr (TRINTELLIX) 20 MG tablet, Take 20 mg by  mouth Daily., Disp: , Rfl:   Past Medical History:   Diagnosis Date   • Aortocoronary bypass status    • CAD in native artery    • Chest pain    • Coronary angioplasty status    • Hyperlipidemia     unspecified   • Hypertension, benign    • Obesity    • Sleep apnea     unspecified     Past Surgical History:   Procedure Laterality Date   • ANKLE SURGERY      with screws    • CARDIAC CATHETERIZATION Left 03/05/2012 7/2016   • CARDIAC CATHETERIZATION Left 8/12/2019    Procedure: Cardiac Catheterization/Vascular Study BRIANNA OK;  Surgeon: Storm Encinas MD;  Location:  [facility];  Service: Cardiology   • CARDIAC CATHETERIZATION Left 10/4/2020    Procedure: Cardiac Catheterization/Vascular Study 6F Right groin ;  Surgeon: Storm Encinas MD;  Location:  [facility];  Service: Cardiology;  Laterality: Left;   • COLONOSCOPY  12/30/2008   • COLONOSCOPY  12/30/2013    two polyps removed from the sigmoid colon   • COLONOSCOPY N/A 6/21/2019    Procedure: COLONOSCOPY WITH ANESTHESIA;  Surgeon: Marciano Jaramillo DO;  Location:  [facility];  Service: Gastroenterology   • CORONARY ARTERY BYPASS GRAFT  1993    x2   • CORONARY STENT PLACEMENT  2019    X 3   • JOINT REPLACEMENT Left     left ankle   • SHOULDER SURGERY Right     with chano   • WRIST SURGERY Left      Family History   Problem Relation Age of Onset   • Diabetes Mother         type II   • Heart failure Mother         congestive heart failure   • Colon polyps Neg Hx    • Esophageal cancer Neg Hx    • Rectal cancer Neg Hx    • Colon cancer Neg Hx      Social History     Socioeconomic History   • Marital status:      Spouse name: Not on file   • Number of children: Not on file   • Years of education: Not on file   • Highest education level: Not on file   Tobacco Use   • Smoking status: Never Smoker   • Smokeless tobacco: Never Used   Substance and Sexual Activity   • Alcohol use: Not Currently     Comment: occasional   • Drug use:  No   • Sexual activity: Defer       Review of Systems   Constitution: Negative for chills, decreased appetite, fever, malaise/fatigue, night sweats, weight gain and weight loss.   HENT: Negative for nosebleeds.    Eyes: Negative for blurred vision and visual disturbance.   Cardiovascular: Positive for chest pain. Negative for dyspnea on exertion, leg swelling, near-syncope, orthopnea, palpitations, paroxysmal nocturnal dyspnea and syncope.   Respiratory: Positive for shortness of breath. Negative for chest tightness, cough, hemoptysis, snoring and wheezing.    Endocrine: Positive for polydipsia. Negative for cold intolerance and heat intolerance.   Hematologic/Lymphatic: Does not bruise/bleed easily.   Skin: Negative for rash.   Musculoskeletal: Negative for back pain, falls, muscle weakness and neck pain.   Gastrointestinal: Positive for constipation and flatus. Negative for abdominal pain, change in bowel habit, diarrhea, dysphagia, heartburn, nausea and vomiting.   Genitourinary: Negative for hematuria.   Neurological: Negative for dizziness, headaches, light-headedness and weakness.   Psychiatric/Behavioral: Negative for altered mental status.   Allergic/Immunologic: Negative for persistent infections.              Objective:     Vitals signs and nursing note reviewed.   Constitutional:       General: Awake.      Appearance: Normal and healthy appearance. Well-developed, normal weight and not in distress.   Eyes:      General: Lids are normal.      Conjunctiva/sclera: Conjunctivae normal.      Pupils: Pupils are equal, round, and reactive to light.   HENT:      Head: Normocephalic and atraumatic.      Nose: Nose normal.   Neck:      Musculoskeletal: Normal range of motion.      Vascular: No JVR. JVD normal.   Pulmonary:      Effort: Pulmonary effort is normal.      Breath sounds: Normal breath sounds. No wheezing. No rhonchi. No rales.   Chest:      Chest wall: Not tender to palpatation.   Cardiovascular:       "PMI at left midclavicular line. Normal rate. Regular rhythm. Normal S1. Normal S2.      Murmurs: There is no murmur.      No gallop. No click. No rub.   Pulses:     Intact distal pulses.   Edema:     Peripheral edema absent.   Abdominal:      General: Bowel sounds are normal.      Palpations: Abdomen is soft.      Tenderness: There is no abdominal tenderness.   Musculoskeletal: Normal range of motion.         General: No tenderness.   Skin:     General: Skin is warm and dry.   Neurological:      General: No focal deficit present.      Mental Status: Alert, oriented to person, place, and time and oriented to person, place and time.   Psychiatric:         Attention and Perception: Attention and perception normal.         Mood and Affect: Mood and affect normal.         Speech: Speech normal.         Behavior: Behavior normal. Behavior is cooperative.         Thought Content: Thought content normal.         Cognition and Memory: Cognition and memory normal.         Judgment: Judgment normal.           Procedures  /69   Pulse 64   Ht 182.9 cm (72\")   Wt 113 kg (250 lb)   SpO2 98%   BMI 33.91 kg/m²   Lab Review:   Lab Results   Component Value Date    WBC 7.10 10/05/2020    HGB 13.4 10/05/2020    HCT 38.6 10/05/2020    MCV 91.7 10/05/2020     10/05/2020     Lab Results   Component Value Date    GLUCOSE 133 (H) 10/05/2020    BUN 11 10/05/2020    CREATININE 0.77 10/05/2020    EGFRIFNONA 102 10/05/2020    BCR 14.3 10/05/2020    K 3.8 10/05/2020    CO2 24.0 10/05/2020    CALCIUM 8.9 10/05/2020    ALBUMIN 4.50 10/03/2020    AST 30 10/03/2020    ALT 25 10/03/2020     Lab Results   Component Value Date    CHOL 119 10/04/2020    CHOL 129 (L) 08/13/2019    CHLPL 143 07/07/2016     Lab Results   Component Value Date    TRIG 192 (H) 10/04/2020    TRIG 180 (H) 08/13/2019    TRIG 180 (H) 07/07/2016     Lab Results   Component Value Date    HDL 28 (L) 10/04/2020    HDL 27 (L) 08/13/2019    HDL 31 07/07/2016     Lab " Results   Component Value Date    LDL 53 10/04/2020    LDL 87 08/13/2019    LDL 93 07/07/2016       I have reviewed the most recent lab results.       Assessment:          Diagnosis Plan   1. Coronary artery disease of bypass graft of native heart with stable angina pectoris (CMS/HCC)  Pt has been referred to Dr. Mirza Loaiza with [facility]. He is scheduled to see him 04/07/21. Check CMP.    2. S/P CABG x 2  LIMA to LAD and SVG to RCA (occluded) 1993   3. Stented coronary artery   Continues on aspirin and Brilinta. Denies bleeding.    4. Presence of coronary artery bypass graft stent  SVG to RCA X3 most recent 8/12/19   5. Essential hypertension  Well controlled.    6. Mixed hyperlipidemia  Managed by PCP. On statin. Well controlled.    7. Obstructive sleep apnea on CPAP  Compliant.    8. Class 1 obesity due to excess calories with serious comorbidity and body mass index (BMI) of 33.0 to 33.9 in adult  Patient's Body mass index is 33.91 kg/m². BMI is above normal parameters. Recommendations include: educational material.            Plan:         1. Continue medications as previously prescribed.  2. Report any worsening symptoms.  3. Report any signs of bleeding.  4. Continue heart healthy diet and regular exercise as tolerated.   5. Follow up with PCP for blood pressure and cholesterol management and routine lab work.  6. Follow up in three months, or sooner if needed.   7. Pt has been referred to Dr. Mirza Loaiza with [facility] per pt request, and he is scheduled to see him 04/07/21.    8. Check CMP.

## 2021-02-08 ENCOUNTER — OFFICE VISIT (OUTPATIENT)
Dept: CARDIOLOGY | Facility: CLINIC | Age: 66
End: 2021-02-08

## 2021-02-08 VITALS
HEART RATE: 64 BPM | WEIGHT: 250 LBS | HEIGHT: 72 IN | OXYGEN SATURATION: 98 % | SYSTOLIC BLOOD PRESSURE: 101 MMHG | BODY MASS INDEX: 33.86 KG/M2 | DIASTOLIC BLOOD PRESSURE: 69 MMHG

## 2021-02-08 DIAGNOSIS — G47.33 OBSTRUCTIVE SLEEP APNEA ON CPAP: Chronic | ICD-10-CM

## 2021-02-08 DIAGNOSIS — I25.708 CORONARY ARTERY DISEASE OF BYPASS GRAFT OF NATIVE HEART WITH STABLE ANGINA PECTORIS (HCC): Primary | ICD-10-CM

## 2021-02-08 DIAGNOSIS — Z95.5 STENTED CORONARY ARTERY: ICD-10-CM

## 2021-02-08 DIAGNOSIS — Z95.5 PRESENCE OF CORONARY ARTERY BYPASS GRAFT STENT: ICD-10-CM

## 2021-02-08 DIAGNOSIS — Z95.1 PRESENCE OF CORONARY ARTERY BYPASS GRAFT STENT: ICD-10-CM

## 2021-02-08 DIAGNOSIS — E78.2 MIXED HYPERLIPIDEMIA: Chronic | ICD-10-CM

## 2021-02-08 DIAGNOSIS — E66.09 CLASS 1 OBESITY DUE TO EXCESS CALORIES WITH SERIOUS COMORBIDITY AND BODY MASS INDEX (BMI) OF 33.0 TO 33.9 IN ADULT: ICD-10-CM

## 2021-02-08 DIAGNOSIS — Z99.89 OBSTRUCTIVE SLEEP APNEA ON CPAP: Chronic | ICD-10-CM

## 2021-02-08 DIAGNOSIS — I10 ESSENTIAL HYPERTENSION: Chronic | ICD-10-CM

## 2021-02-08 DIAGNOSIS — Z95.1 S/P CABG X 2: ICD-10-CM

## 2021-02-08 PROCEDURE — 99214 OFFICE O/P EST MOD 30 MIN: CPT | Performed by: NURSE PRACTITIONER

## 2021-02-08 RX ORDER — NITROGLYCERIN 0.4 MG/1
TABLET SUBLINGUAL
Qty: 100 TABLET | Refills: 11 | Status: ON HOLD | OUTPATIENT
Start: 2021-02-08 | End: 2022-04-27

## 2021-02-17 ENCOUNTER — TELEPHONE (OUTPATIENT)
Dept: CARDIOLOGY | Facility: CLINIC | Age: 66
End: 2021-02-17

## 2021-02-17 NOTE — TELEPHONE ENCOUNTER
Pt has called for the results of his recent CMP ordered 2/8/21    I have called [facility] and requested those labs.     Please advise

## 2021-04-25 LAB
ALBUMIN SERPL-MCNC: 4.6 G/DL (ref 3.8–4.8)
ALBUMIN/GLOB SERPL: 2.1 {RATIO} (ref 1.2–2.2)
ALP SERPL-CCNC: 62 IU/L (ref 39–117)
ALT SERPL-CCNC: 31 IU/L (ref 0–44)
AMBIG ABBREV CMP14 DEFAULT: NORMAL
AST SERPL-CCNC: 20 IU/L (ref 0–40)
BILIRUB SERPL-MCNC: 0.4 MG/DL (ref 0–1.2)
BUN SERPL-MCNC: 12 MG/DL (ref 8–27)
BUN/CREAT SERPL: 11 (ref 10–24)
CALCIUM SERPL-MCNC: 9.9 MG/DL (ref 8.6–10.2)
CHLORIDE SERPL-SCNC: 106 MMOL/L (ref 96–106)
CO2 SERPL-SCNC: 22 MMOL/L (ref 20–29)
CREAT SERPL-MCNC: 1.07 MG/DL (ref 0.76–1.27)
GLOBULIN SER CALC-MCNC: 2.2 G/DL (ref 1.5–4.5)
GLUCOSE SERPL-MCNC: 127 MG/DL (ref 65–99)
POTASSIUM SERPL-SCNC: 4.6 MMOL/L (ref 3.5–5.2)
PROT SERPL-MCNC: 6.8 G/DL (ref 6–8.5)
SODIUM SERPL-SCNC: 142 MMOL/L (ref 134–144)

## 2021-06-15 NOTE — TELEPHONE ENCOUNTER
Called, spoke with Pt. Two pt identifiers confirmed. Pt informed of seeing Dr. Carlin Doing with [facility]. Pt given contact information. Pt verbalized understanding of information discussed w/ no further questions at this time. Patient notified with verbal understanding.

## 2021-08-09 RX ORDER — ISOSORBIDE MONONITRATE 120 MG/1
120 TABLET, EXTENDED RELEASE ORAL DAILY
Qty: 90 TABLET | Refills: 3 | Status: SHIPPED | OUTPATIENT
Start: 2021-08-09 | End: 2022-04-13 | Stop reason: SDUPTHER

## 2021-08-19 ENCOUNTER — TELEPHONE (OUTPATIENT)
Dept: CARDIOLOGY | Facility: CLINIC | Age: 66
End: 2021-08-19

## 2021-10-12 ENCOUNTER — TELEPHONE (OUTPATIENT)
Dept: CARDIOLOGY | Facility: CLINIC | Age: 66
End: 2021-10-12

## 2021-10-12 NOTE — TELEPHONE ENCOUNTER
PATIENT DROPPED OFF PAPERS AND I CALLED TO SEE WHAT DATES THIS WAS FOR AND HE STATED 10/03/2020 AND HE STATED THAT HE IS HAVING SURGERY ON HIS HEART AND THEY ARE DOING BYPASS IN [place]. PER PATIENT.     HE STATED THAT IT NEEDING TO HAVE HIS ACCOUNT # ON IT AND THEY WOULD PICK IT UP.

## 2021-12-10 ENCOUNTER — OFFICE VISIT (OUTPATIENT)
Dept: CARDIOLOGY | Facility: CLINIC | Age: 66
End: 2021-12-10

## 2021-12-10 VITALS
OXYGEN SATURATION: 98 % | BODY MASS INDEX: 34.67 KG/M2 | SYSTOLIC BLOOD PRESSURE: 112 MMHG | WEIGHT: 256 LBS | DIASTOLIC BLOOD PRESSURE: 68 MMHG | HEART RATE: 62 BPM | HEIGHT: 72 IN

## 2021-12-10 DIAGNOSIS — I10 ESSENTIAL HYPERTENSION: Chronic | ICD-10-CM

## 2021-12-10 DIAGNOSIS — Z95.5 STENTED CORONARY ARTERY: ICD-10-CM

## 2021-12-10 DIAGNOSIS — Z95.1 PRESENCE OF CORONARY ARTERY BYPASS GRAFT STENT: Primary | ICD-10-CM

## 2021-12-10 DIAGNOSIS — I25.810 CORONARY ARTERY DISEASE INVOLVING CORONARY BYPASS GRAFT OF NATIVE HEART WITHOUT ANGINA PECTORIS: ICD-10-CM

## 2021-12-10 DIAGNOSIS — E78.2 MIXED HYPERLIPIDEMIA: Chronic | ICD-10-CM

## 2021-12-10 DIAGNOSIS — Z95.5 PRESENCE OF CORONARY ARTERY BYPASS GRAFT STENT: Primary | ICD-10-CM

## 2021-12-10 DIAGNOSIS — Z95.1 S/P CABG X 2: ICD-10-CM

## 2021-12-10 PROCEDURE — 93000 ELECTROCARDIOGRAM COMPLETE: CPT | Performed by: INTERNAL MEDICINE

## 2021-12-10 PROCEDURE — 99214 OFFICE O/P EST MOD 30 MIN: CPT | Performed by: INTERNAL MEDICINE

## 2021-12-10 RX ORDER — CARVEDILOL 12.5 MG/1
12.5 TABLET ORAL 2 TIMES DAILY
Qty: 180 TABLET | Refills: 3 | Status: SHIPPED | OUTPATIENT
Start: 2021-12-10 | End: 2022-08-16 | Stop reason: SDUPTHER

## 2021-12-10 RX ORDER — RAMIPRIL 2.5 MG/1
2.5 CAPSULE ORAL NIGHTLY
Qty: 90 CAPSULE | Refills: 3 | Status: SHIPPED | OUTPATIENT
Start: 2021-12-10 | End: 2022-08-16 | Stop reason: SDUPTHER

## 2021-12-10 RX ORDER — ATORVASTATIN CALCIUM 40 MG/1
40 TABLET, FILM COATED ORAL DAILY
Qty: 90 TABLET | Refills: 3 | Status: ON HOLD | OUTPATIENT
Start: 2021-12-10 | End: 2022-04-28 | Stop reason: SDUPTHER

## 2021-12-10 RX ORDER — CLOPIDOGREL BISULFATE 75 MG/1
75 TABLET ORAL DAILY
Qty: 90 TABLET | Refills: 3 | Status: SHIPPED | OUTPATIENT
Start: 2021-12-10 | End: 2022-08-16 | Stop reason: SDUPTHER

## 2021-12-10 NOTE — PROGRESS NOTES
Lio Velasquez  [id number]  1955  65 y.o.  male    Referring Provider: Bridger Rosales MD    Reason for Follow-up Visit: Here for routine follow up   Prior to last visit seen after dobutamine stress echo  CAD S/P CABG X2 S/P stenting of SVG x2 last 7/16  S/P 2017  left ankle replacement Dr Alanna Dias [facility]  Now with recurrent chest pain, classic angina pectoris  coronary artery disease stented coronary artery   Repeat CABG  10/2022 [facility] [place]  Saw Dr Foster and referred for redo CABG  Cannot afford Brilinta     Subjective    No new events or complaints since last visit   short term office follow up after recent encounter   Minor soreness right pectoralis region     Persistent mild improved shortness of breath   No exertional chest pain   Tolerating current medications well with no untoward side effects   Compliant with prescribed medication regimen. Tries to adhere to cardiac diet.      Mild chronic exertional shortness of breath on exertion relieved with rest  No significant cough or wheezing    No palpitations  Now with moderate pedal edema    No fever or chills  No significant expectoration    No hemoptysis  No presyncope or syncope     Has not started cardiac rehab     On PRN Lasix   No bleeding, excessive bruising, gait instability or fall risks    BP well controlled at home.       Surgical wound healed very well. No evidence of excessive bruising, pain, redness, swelling, discharge or foul odor noted  Patient declined any recent history of fever, chills, pain or warmth locally       History of present illness:  Lio Velasquez is a 65 y.o. yo male with  coronary artery disease Coronary artery bypass grafting stented coronary artery  who presents today for   Chief Complaint   Patient presents with   • Follow-up   .    History  Past Medical History:   Diagnosis Date   • Aortocoronary bypass status    • CAD in native artery    • Chest pain    • Coronary angioplasty status    •  Hyperlipidemia     unspecified   • Hypertension, benign    • Obesity    • Sleep apnea     unspecified   ,   Past Surgical History:   Procedure Laterality Date   • ANKLE SURGERY      with screws    • CARDIAC CATHETERIZATION Left 03/05/2012 7/2016   • CARDIAC CATHETERIZATION Left 8/12/2019    Procedure: Cardiac Catheterization/Vascular Study BRIANNA OK;  Surgeon: Storm Encinas MD;  Location:  [facility] CATH [facility]AS[facility];  Service: Cardiology   • CARDIAC CATHETERIZATION Left 10/4/2020    Procedure: Cardiac Catheterization/Vascular Study 6F Right groin ;  Surgeon: Storm Encinas MD;  Location:  [facility] CATH INVAS[facility];  Service: Cardiology;  Laterality: Left;   • COLONOSCOPY  12/30/2008   • COLONOSCOPY  12/30/2013    two polyps removed from the sigmoid colon   • COLONOSCOPY N/A 6/21/2019    Procedure: COLONOSCOPY WITH ANESTHESIA;  Surgeon: Marciano Jaramillo DO;  Location:  [facility] ENDOSCOPY;  Service: Gastroenterology   • CORONARY ARTERY BYPASS GRAFT  1993    x2   • CORONARY STENT PLACEMENT  2019    X 3   • JOINT REPLACEMENT Left     left ankle   • SHOULDER SURGERY Right     with chano   • WRIST SURGERY Left    ,   Family History   Problem Relation Age of Onset   • Diabetes Mother         type II   • Heart failure Mother         congestive heart failure   • Colon polyps Neg Hx    • Esophageal cancer Neg Hx    • Rectal cancer Neg Hx    • Colon cancer Neg Hx    ,   Social History     Tobacco Use   • Smoking status: Never Smoker   • Smokeless tobacco: Never Used   Substance Use Topics   • Alcohol use: Not Currently     Comment: occasional   • Drug use: No   ,     Medications  Current Outpatient Medications   Medication Sig Dispense Refill   • aspirin 81 MG tablet Take 81 mg by mouth every night.     • atorvastatin (LIPITOR) 40 MG tablet Take 1 tablet by mouth Daily. 90 tablet 3   • carvedilol (COREG) 12.5 MG tablet Take 1 tablet by mouth 2 (Two) Times a Day. 180 tablet 3   • clopidogrel (PLAVIX) 75 MG tablet Take 1 tablet  "by mouth Daily. 90 tablet 3   • folic acid-pyridoxine-cyanocobalamin (FOLTX) 2.5-25-2 MG tablet tablet Take 1 tablet by mouth every night.     • isosorbide mononitrate (IMDUR) 120 MG 24 hr tablet TAKE 1 TABLET BY MOUTH DAILY.  90 tablet 3   • Multiple Vitamins-Minerals (CENTRUM CARDIO PO) Take 1 tablet by mouth Every Night.     • nitroglycerin (NITROSTAT) 0.4 MG SL tablet 1 under the tongue as needed for angina, may repeat q5mins for up three doses 100 tablet 11   • ramipril (ALTACE) 2.5 MG capsule Take 1 capsule by mouth Every Night. 90 capsule 3   • ranolazine (RANEXA) 1000 MG 12 hr tablet Take 1 tablet by mouth 2 (Two) Times a Day. 60 tablet 11   • Vortioxetine HBr (TRINTELLIX) 20 MG tablet Take 20 mg by mouth Daily.       No current facility-administered medications for this visit.       Allergies:  Demerol [meperidine] and Morphine and related    Review of Systems  Review of Systems   Constitutional: Positive for malaise/fatigue.   HENT: Negative.    Eyes: Negative.    Cardiovascular: Positive for dyspnea on exertion. Negative for chest pain, claudication, cyanosis, irregular heartbeat, leg swelling, near-syncope, orthopnea, palpitations, paroxysmal nocturnal dyspnea and syncope.   Respiratory: Negative.    Endocrine: Negative.    Hematologic/Lymphatic: Negative.    Skin: Negative.    Musculoskeletal: Positive for arthritis, back pain, joint pain and stiffness.   Gastrointestinal: Negative for anorexia.   Genitourinary: Negative.    Neurological: Positive for weakness.   Psychiatric/Behavioral: Negative.        Objective     Physical Exam:  /68   Pulse 62   Ht 182.9 cm (72\")   Wt 116 kg (256 lb)   SpO2 98%   BMI 34.72 kg/m²   Physical Exam   Constitutional: He appears well-developed.   HENT:   Head: Normocephalic.   Neck: Normal carotid pulses and no JVD present. No tracheal tenderness present. Carotid bruit is not present. No tracheal deviation present.   Cardiovascular: Regular rhythm, normal heart " sounds and normal pulses.   Pulmonary/Chest: Effort normal. No stridor.   Abdominal: Soft. He exhibits no distension. There is no abdominal tenderness.   Musculoskeletal:      Right lower le+ Pitting Edema present.      Left lower le+ Pitting Edema present.     Vascular Status -  His right foot exhibits abnormal foot edema. His left foot exhibits abnormal foot edema.  Neurological: He is alert. No cranial nerve deficit or sensory deficit.   Skin: Skin is warm.   Psychiatric: His speech is normal and behavior is normal.         Surgical wound healed very well. No evidence of excessive bruising, pain, redness, swelling, discharge or foul odor noted  Patient declined any recent history of fever, chills, pain or warmth locally         Results Review:      Results for orders placed during the hospital encounter of 10/03/20    Adult Transthoracic Echo Complete W/ Cont if Necessary Per Protocol    Interpretation Summary  · Left ventricular ejection fraction appears to be 56 - 60%. Left ventricular systolic function is normal.  · Normal diastolic function  · No pulmonary hypertension   cath 19     Conclusion of cardiac catheterization     Severe sequential stenosis of mid and distal saphenous venous graft to right coronary artery treated primary stenting using 3.5 x 38 mm Jamee drug-eluting stent  PTCA of the distal right coronary artery followed by implantation of 3.5 x 23 mm Jamee drug-eluting stent.  The very distal portion of the saphenous venous graft to the right coronary artery has a 50% stenosis.  ROSS-3 flow before and after procedure.  No symptoms.  Filter wire was used.        ____________________________________________________________________________________________________________________________________________     Plan after cardiac catheterization        Discontinue Plavix  Start Brilinta 180 mg now followed by 90 mg p.o. twice daily  Stop Zocor and start Crestor 20 mg daily  Keep LDL well  below 70 mg/dL  Given multiple stent placement to saphenous venous graft to the right coronary artery much higher incidence of acute stent thrombosis.  If he has any chest pain to come to the emergency room immediately.     Intensive risk factor modifications for both primary and secondary prevention if applicable  Hydration   Observation          ECG 12 Lead    Date/Time: 12/10/2021 8:22 AM  Performed by: Storm Encinas MD  Authorized by: Storm Encinas MD   Comparison: compared with previous ECG from 10/22/2020  Comparison to previous ECG: T wave changes inferiorly resolved  Rhythm: sinus rhythm  Rate: normal  Conduction: conduction normal  ST Segments: ST segments normal  T Waves: T waves normal  QRS axis: normal  Other: no other findings    Clinical impression: normal ECG          ____________________________________________________________________________________________________________________________________________  Health maintenance and recommendations    Low salt/ HTN/ Heart healthy carbohydrate restricted cardiac diet (printed dietary and general instructions provided for home review )  The patient is advised to reduce or avoid caffeine or other cardiac stimulants.     The patient was advised to avoid long term NSAIDS , use Tylenol PRN instead  Avoid cardiac stimulants including common drugs like Pseudoephedrine or excessive amounts of caffeine  Monitor for any signs of bleeding including red or dark stools. Fall precautions.   Advised staying uptodate with immunizations per established standard guidelines.  Offered to give patient  a copy      Questions were encouraged, asked and answered to the patient's  understanding and satisfaction. Questions if any regarding current medications and side effects, need for refills and importance of compliance to medications stressed.    Reviewed available prior notes, consults, prior visits, laboratory findings, radiology and cardiology relevant reports. Updated chart  as applicable. I have reviewed the patient's medical history in detail and updated the computerized patient record as relevant.      Updated patient regarding any new or relevant abnormalities on review of records or any new findings on physical exam. Mentioned to patient about purpose of visit and desirable health short and long term goals and objectives.    Primary to monitor CBC CMP Lipid panel and TSH as applicable    ___________________________________________________________________________________________________________________________________________        Diagnoses and all orders for this visit:    1. Presence of coronary artery bypass graft stent (Primary)    2. S/P CABG x 2  -     Ambulatory Referral to Cardiac Rehab    3. Stented coronary artery   -     ECG 12 Lead    4. Mixed hyperlipidemia    5. Essential hypertension    6. Coronary artery disease involving coronary bypass graft of native heart without angina pectoris  -     ECG 12 Lead    Other orders  -     clopidogrel (PLAVIX) 75 MG tablet; Take 1 tablet by mouth Daily.  Dispense: 90 tablet; Refill: 3  -     carvedilol (COREG) 12.5 MG tablet; Take 1 tablet by mouth 2 (Two) Times a Day.  Dispense: 180 tablet; Refill: 3  -     ramipril (ALTACE) 2.5 MG capsule; Take 1 capsule by mouth Every Night.  Dispense: 90 capsule; Refill: 3  -     atorvastatin (LIPITOR) 40 MG tablet; Take 1 tablet by mouth Daily.  Dispense: 90 tablet; Refill: 3            Plan      Orders Placed This Encounter   Procedures   • Ambulatory Referral to Cardiac Rehab     Referral Priority:   Routine     Referral Type:   Rehabilitation - Outpatient     Number of Visits Requested:   1   • ECG 12 Lead     This order was created via procedure documentation     Order Specific Question:   Release to patient     Answer:   Immediate      I support the patient's decision to take the Covid -19 vaccine   Had required complete course   No major issues   Now fully immunized    Recommend booster        Overall doing well no new cardiovascular symptoms and therefore no additional cardiac testing is required   If any interim issues arise will call me for further evaluation.       S/L NTG PRN for chest pain, call me or go to ER if has to use S/L nitroglycerin   Keep LDL below 70 mg/dl. Monitor liver and renal functions.   Monitor CBC, CMP, TSH (as indicated) and Lipid Panel by primary       Patient was advised to continue CPAP daily.     I support the patient's decision to take the Covid -19 vaccine   Had required complete course   No major issues   Now fully immunized    Recommend booster         Follow up with RODGER Vale  or myself             Return in about 6 months (around 6/10/2022).

## 2022-03-04 ENCOUNTER — APPOINTMENT (OUTPATIENT)
Dept: CARDIAC REHAB | Facility: HOSPITAL | Age: 67
End: 2022-03-04

## 2022-03-09 ENCOUNTER — APPOINTMENT (OUTPATIENT)
Dept: CARDIAC REHAB | Facility: HOSPITAL | Age: 67
End: 2022-03-09

## 2022-03-09 DIAGNOSIS — Z95.1 S/P CABG X 2: Primary | ICD-10-CM

## 2022-03-09 PROCEDURE — 93798 PHYS/QHP OP CAR RHAB W/ECG: CPT

## 2022-03-11 ENCOUNTER — TREATMENT (OUTPATIENT)
Dept: CARDIAC REHAB | Facility: HOSPITAL | Age: 67
End: 2022-03-11

## 2022-03-11 DIAGNOSIS — Z95.1 S/P CABG X 2: Primary | ICD-10-CM

## 2022-03-11 PROCEDURE — 93798 PHYS/QHP OP CAR RHAB W/ECG: CPT

## 2022-03-14 ENCOUNTER — TREATMENT (OUTPATIENT)
Dept: CARDIAC REHAB | Facility: HOSPITAL | Age: 67
End: 2022-03-14

## 2022-03-14 DIAGNOSIS — Z95.1 S/P CABG X 2: Primary | ICD-10-CM

## 2022-03-14 PROCEDURE — 93798 PHYS/QHP OP CAR RHAB W/ECG: CPT

## 2022-03-16 ENCOUNTER — TREATMENT (OUTPATIENT)
Dept: CARDIAC REHAB | Facility: HOSPITAL | Age: 67
End: 2022-03-16

## 2022-03-16 DIAGNOSIS — Z95.1 S/P CABG X 2: Primary | ICD-10-CM

## 2022-03-16 PROCEDURE — 93798 PHYS/QHP OP CAR RHAB W/ECG: CPT

## 2022-03-18 ENCOUNTER — TREATMENT (OUTPATIENT)
Dept: CARDIAC REHAB | Facility: HOSPITAL | Age: 67
End: 2022-03-18

## 2022-03-18 DIAGNOSIS — Z95.1 S/P CABG X 2: Primary | ICD-10-CM

## 2022-03-18 PROCEDURE — 93798 PHYS/QHP OP CAR RHAB W/ECG: CPT

## 2022-03-21 ENCOUNTER — TREATMENT (OUTPATIENT)
Dept: CARDIAC REHAB | Facility: HOSPITAL | Age: 67
End: 2022-03-21

## 2022-03-21 DIAGNOSIS — Z95.1 S/P CABG X 2: Primary | ICD-10-CM

## 2022-03-21 PROCEDURE — 93798 PHYS/QHP OP CAR RHAB W/ECG: CPT

## 2022-03-23 ENCOUNTER — TREATMENT (OUTPATIENT)
Dept: CARDIAC REHAB | Facility: HOSPITAL | Age: 67
End: 2022-03-23

## 2022-03-23 DIAGNOSIS — Z95.1 S/P CABG X 2: Primary | ICD-10-CM

## 2022-03-23 PROCEDURE — 93798 PHYS/QHP OP CAR RHAB W/ECG: CPT

## 2022-03-25 ENCOUNTER — TREATMENT (OUTPATIENT)
Dept: CARDIAC REHAB | Facility: HOSPITAL | Age: 67
End: 2022-03-25

## 2022-03-25 DIAGNOSIS — Z95.1 S/P CABG X 2: Primary | ICD-10-CM

## 2022-03-25 PROCEDURE — 93798 PHYS/QHP OP CAR RHAB W/ECG: CPT

## 2022-03-28 ENCOUNTER — TREATMENT (OUTPATIENT)
Dept: CARDIAC REHAB | Facility: HOSPITAL | Age: 67
End: 2022-03-28

## 2022-03-28 ENCOUNTER — TELEPHONE (OUTPATIENT)
Dept: CARDIAC REHAB | Facility: HOSPITAL | Age: 67
End: 2022-03-28

## 2022-03-28 DIAGNOSIS — Z95.1 S/P CABG X 2: Primary | ICD-10-CM

## 2022-03-28 PROCEDURE — 93798 PHYS/QHP OP CAR RHAB W/ECG: CPT

## 2022-03-28 NOTE — TELEPHONE ENCOUNTER
Mr. Velasquez is having some light chest pressure 3-4/10 while walking on treadmill the last couple of sessions in Cardiac Rehab. He doesn't have it while exercising on the arm ergometer or recumbent stepper. He says he also has it occasionally at home when he gets up from a resting position and walking to another area in his home. If you view CR 8 in the Media tab, I question some ST depression while he is on treadmill.

## 2022-03-29 ENCOUNTER — PREP FOR SURGERY (OUTPATIENT)
Dept: OTHER | Facility: HOSPITAL | Age: 67
End: 2022-03-29

## 2022-03-29 ENCOUNTER — OFFICE VISIT (OUTPATIENT)
Dept: CARDIOLOGY | Facility: CLINIC | Age: 67
End: 2022-03-29

## 2022-03-29 VITALS
HEART RATE: 60 BPM | SYSTOLIC BLOOD PRESSURE: 116 MMHG | OXYGEN SATURATION: 98 % | HEIGHT: 72 IN | BODY MASS INDEX: 36.3 KG/M2 | DIASTOLIC BLOOD PRESSURE: 76 MMHG | WEIGHT: 268 LBS

## 2022-03-29 DIAGNOSIS — I25.700 CORONARY ARTERY DISEASE INVOLVING CORONARY BYPASS GRAFT OF NATIVE HEART WITH UNSTABLE ANGINA PECTORIS: Primary | ICD-10-CM

## 2022-03-29 DIAGNOSIS — Z99.89 OBSTRUCTIVE SLEEP APNEA ON CPAP: Chronic | ICD-10-CM

## 2022-03-29 DIAGNOSIS — G47.33 OBSTRUCTIVE SLEEP APNEA ON CPAP: Chronic | ICD-10-CM

## 2022-03-29 DIAGNOSIS — I25.118 CORONARY ARTERY DISEASE WITH EXERTIONAL ANGINA: ICD-10-CM

## 2022-03-29 DIAGNOSIS — I20.8 EXERTIONAL ANGINA: Primary | ICD-10-CM

## 2022-03-29 DIAGNOSIS — E78.2 MIXED HYPERLIPIDEMIA: Chronic | ICD-10-CM

## 2022-03-29 DIAGNOSIS — I10 ESSENTIAL HYPERTENSION: Chronic | ICD-10-CM

## 2022-03-29 PROBLEM — I20.89 EXERTIONAL ANGINA: Status: ACTIVE | Noted: 2022-03-29

## 2022-03-29 PROCEDURE — 93000 ELECTROCARDIOGRAM COMPLETE: CPT | Performed by: NURSE PRACTITIONER

## 2022-03-29 PROCEDURE — 99214 OFFICE O/P EST MOD 30 MIN: CPT | Performed by: NURSE PRACTITIONER

## 2022-03-29 RX ORDER — SODIUM CHLORIDE 0.9 % (FLUSH) 0.9 %
3 SYRINGE (ML) INJECTION EVERY 12 HOURS SCHEDULED
Status: CANCELLED | OUTPATIENT
Start: 2022-03-29

## 2022-03-29 RX ORDER — SODIUM CHLORIDE 0.9 % (FLUSH) 0.9 %
3-10 SYRINGE (ML) INJECTION AS NEEDED
Status: CANCELLED | OUTPATIENT
Start: 2022-03-29

## 2022-03-29 NOTE — PROGRESS NOTES
Subjective:     Encounter Date:03/29/2022      Patient ID: Lio Velasquez is a 66 y.o. male     Chief Complaint:  History of Present Illness  Patient presents today for chest pain. Patient had CABG in 1993 in [place]. He had redo 1 vessel CABG to RCA in October 2021 at [place]. He has been in Cardiac Rehab. At the last few sessions he has been having chest heaviness and bilateral arm pain on the treadmill. He notes this feels like his previous angina. On Last 2 sessions he has had to stop early due to this pain. He was also noted to have ST depression on exertion- that returned to baseline at rest. He notes he has also been having these exertional symptoms at home. He also reports dizziness which he was stopped off Amiodarone by his [place] cardiologist following CABG and his dizziness improved. Though he still notes dizziness at times. He takes Lasix as needed and notes he has dizziness upon standing after taking Lasix. He follows with Dr. Bridger ramos as his PCP. He is on Lipitor and last LDL At goal.     The following portions of the patient's history were reviewed and updated as appropriate: allergies, current medications, past medical history, past social history, past and problem list.    Allergies   Allergen Reactions   • Demerol [Meperidine] Itching   • Morphine And Related Itching       Current Outpatient Medications:   •  aspirin 81 MG tablet, Take 81 mg by mouth every night., Disp: , Rfl:   •  atorvastatin (LIPITOR) 40 MG tablet, Take 1 tablet by mouth Daily., Disp: 90 tablet, Rfl: 3  •  carvedilol (COREG) 12.5 MG tablet, Take 1 tablet by mouth 2 (Two) Times a Day., Disp: 180 tablet, Rfl: 3  •  folic acid-pyridoxine-cyanocobalamin (FOLBIC) 2.5-25-2 MG tablet tablet, Take 1 tablet by mouth every night., Disp: , Rfl:   •  isosorbide mononitrate (IMDUR) 120 MG 24 hr tablet, TAKE 1 TABLET BY MOUTH DAILY. , Disp: 90 tablet, Rfl: 3  •  Multiple Vitamins-Minerals (CENTRUM CARDIO PO), Take 1 tablet by  mouth Every Night., Disp: , Rfl:   •  nitroglycerin (NITROSTAT) 0.4 MG SL tablet, 1 under the tongue as needed for angina, may repeat q5mins for up three doses, Disp: 100 tablet, Rfl: 11  •  ramipril (ALTACE) 2.5 MG capsule, Take 1 capsule by mouth Every Night., Disp: 90 capsule, Rfl: 3  •  ranolazine (RANEXA) 1000 MG 12 hr tablet, Take 1 tablet by mouth 2 (Two) Times a Day., Disp: 60 tablet, Rfl: 11  •  ticagrelor (BRILINTA) 90 MG tablet tablet, Take 90 mg by mouth Every 12 (Twelve) Hours., Disp: , Rfl:   •  clopidogrel (PLAVIX) 75 MG tablet, Take 1 tablet by mouth Daily., Disp: 90 tablet, Rfl: 3    Social History     Socioeconomic History   • Marital status:    Tobacco Use   • Smoking status: Never Smoker   • Smokeless tobacco: Never Used   Vaping Use   • Vaping Use: Never used   Substance and Sexual Activity   • Alcohol use: Not Currently     Comment: occasional   • Drug use: No   • Sexual activity: Defer       Review of Systems   Constitutional: Positive for malaise/fatigue. Negative for chills, decreased appetite, fever, weight gain and weight loss.   HENT: Negative for nosebleeds.    Eyes: Positive for blurred vision. Negative for visual disturbance.   Cardiovascular: Positive for chest pain and dyspnea on exertion. Negative for leg swelling, near-syncope, orthopnea, palpitations, paroxysmal nocturnal dyspnea and syncope.   Respiratory: Negative for cough, hemoptysis, shortness of breath and snoring.    Endocrine: Negative for cold intolerance and heat intolerance.   Hematologic/Lymphatic: Negative for bleeding problem. Does not bruise/bleed easily.   Skin: Negative for rash.   Musculoskeletal: Negative for back pain and falls.   Gastrointestinal: Negative for abdominal pain, constipation, diarrhea, heartburn, melena, nausea and vomiting.   Genitourinary: Negative for hematuria.   Neurological: Positive for dizziness. Negative for headaches and light-headedness.   Psychiatric/Behavioral: Negative for  "altered mental status.   Allergic/Immunologic: Negative for persistent infections.              Objective:     Vitals reviewed.   Constitutional:       Appearance: Healthy appearance. Well-developed and not in distress.   Eyes:      Pupils: Pupils are equal, round, and reactive to light.   HENT:      Head: Normocephalic and atraumatic.   Neck:      Vascular: No carotid bruit or JVD.   Pulmonary:      Effort: Pulmonary effort is normal.      Breath sounds: Normal breath sounds.   Cardiovascular:      Normal rate. Regular rhythm.   Pulses:     Intact distal pulses.   Edema:     Peripheral edema absent.   Abdominal:      General: Bowel sounds are normal.      Palpations: Abdomen is soft.   Musculoskeletal: Normal range of motion.      Cervical back: Normal range of motion and neck supple. Skin:     General: Skin is warm and dry.   Neurological:      Mental Status: Alert and oriented to person, place, and time.      Deep Tendon Reflexes: Reflexes are normal and symmetric.   Psychiatric:         Behavior: Behavior normal.         Thought Content: Thought content normal.         Judgment: Judgment normal.             ECG 12 Lead    Date/Time: 3/29/2022 1:18 PM  Performed by: Guido Brunner APRN  Authorized by: Guido Brunner APRN   Comparison: compared with previous ECG from 10/10/2021  Similar to previous ECG  Rhythm: sinus rhythm          /76   Pulse 60   Ht 182.9 cm (72\")   Wt 122 kg (268 lb)   SpO2 98%   BMI 36.35 kg/m²   Lab Review:   I have reviewed       Lab Results   Component Value Date    CHOL 119 10/04/2020    CHLPL 143 07/07/2016    TRIG 192 (H) 10/04/2020    HDL 28 (L) 10/04/2020    LDL 53 10/04/2020      Results for orders placed during the hospital encounter of 10/03/20    Adult Transthoracic Echo Complete W/ Cont if Necessary Per Protocol    Interpretation Summary  · Left ventricular ejection fraction appears to be 56 - 60%. Left ventricular systolic function is normal.  · Normal diastolic " function  · No pulmonary hypertension     Assessment:          Diagnosis Plan   1. Coronary artery disease involving coronary bypass graft of native heart with unstable angina pectoris (HCC)     2. Essential hypertension     3. Obstructive sleep apnea on CPAP     4. Mixed hyperlipidemia     5. BMI 36.0-36.9,adult            Plan:       1. Coronary Artery Disease with CABG x 2 in 1993, multiple stents to RCA graft disease in the past. Redo CABG x 1 in October 2021 to RCA. Now with exertional angina. Similar to previous angina. ST depression on EKG during cardiac rehab. On Plavix, Aspirin, Lipitor, Coreg, Altace, Ranexa and Imdur. Discussed with Dr. Encinas. Will plan for heart cath. Discussed nitro use.   2. Hypertension- blood pressure controlled. Borderline low today. But he reports has been normal at home.   3. Sleep Apnea- compliant   4. Hyperlipidemia- at goal on last check.   5. Patient's Body mass index is 36.35 kg/m². indicating that he is morbidly obese (BMI > 40 or > 35 with obesity - related health condition). Obesity-related health conditions include the following: obstructive sleep apnea, hypertension, coronary heart disease and dyslipidemias. Obesity is unchanged. BMI is is above average; no BMI management plan is appropriate. We discussed portion control and increasing exercise..           I spent 35 minutes caring for Lio on this date of service. This time includes time spent by me in the following activities:preparing for the visit, reviewing tests, obtaining and/or reviewing a separately obtained history, performing a medically appropriate examination and/or evaluation , counseling and educating the patient/family/caregiver, ordering medications, tests, or procedures, referring and communicating with other health care professionals , documenting information in the medical record, independently interpreting results and communicating that information with the patient/family/caregiver and care  coordination

## 2022-03-30 ENCOUNTER — APPOINTMENT (OUTPATIENT)
Dept: CARDIAC REHAB | Facility: HOSPITAL | Age: 67
End: 2022-03-30

## 2022-04-01 ENCOUNTER — APPOINTMENT (OUTPATIENT)
Dept: CARDIAC REHAB | Facility: HOSPITAL | Age: 67
End: 2022-04-01

## 2022-04-06 ENCOUNTER — APPOINTMENT (OUTPATIENT)
Dept: CARDIAC REHAB | Facility: HOSPITAL | Age: 67
End: 2022-04-06

## 2022-04-08 ENCOUNTER — APPOINTMENT (OUTPATIENT)
Dept: CARDIAC REHAB | Facility: HOSPITAL | Age: 67
End: 2022-04-08

## 2022-04-11 ENCOUNTER — HOSPITAL ENCOUNTER (OUTPATIENT)
Facility: HOSPITAL | Age: 67
Setting detail: HOSPITAL OUTPATIENT SURGERY
Discharge: HOME | End: 2022-04-11
Attending: INTERNAL MEDICINE | Admitting: INTERNAL MEDICINE

## 2022-04-11 VITALS
TEMPERATURE: 97.1 F | HEART RATE: 62 BPM | DIASTOLIC BLOOD PRESSURE: 78 MMHG | SYSTOLIC BLOOD PRESSURE: 116 MMHG | BODY MASS INDEX: 36.38 KG/M2 | WEIGHT: 268.6 LBS | HEIGHT: 72 IN | OXYGEN SATURATION: 95 % | RESPIRATION RATE: 18 BRPM

## 2022-04-11 DIAGNOSIS — I20.8 EXERTIONAL ANGINA: ICD-10-CM

## 2022-04-11 DIAGNOSIS — I25.118 CORONARY ARTERY DISEASE WITH EXERTIONAL ANGINA: ICD-10-CM

## 2022-04-11 LAB
ANION GAP SERPL CALCULATED.3IONS-SCNC: 9 MMOL/L (ref 5–15)
BASOPHILS # BLD AUTO: 0.04 10*3/MM3 (ref 0–0.2)
BASOPHILS NFR BLD AUTO: 0.9 % (ref 0–1.5)
BUN SERPL-MCNC: 11 MG/DL (ref 8–23)
BUN/CREAT SERPL: 12.8 (ref 7–25)
CALCIUM SPEC-SCNC: 9.1 MG/DL (ref 8.6–10.5)
CHLORIDE SERPL-SCNC: 107 MMOL/L (ref 98–107)
CO2 SERPL-SCNC: 26 MMOL/L (ref 22–29)
CREAT SERPL-MCNC: 0.86 MG/DL (ref 0.76–1.27)
DEPRECATED RDW RBC AUTO: 45.9 FL (ref 37–54)
EGFRCR SERPLBLD CKD-EPI 2021: 96.1 ML/MIN/1.73
EOSINOPHIL # BLD AUTO: 0.11 10*3/MM3 (ref 0–0.4)
EOSINOPHIL NFR BLD AUTO: 2.4 % (ref 0.3–6.2)
ERYTHROCYTE [DISTWIDTH] IN BLOOD BY AUTOMATED COUNT: 13.3 % (ref 12.3–15.4)
GLUCOSE SERPL-MCNC: 123 MG/DL (ref 65–99)
HCT VFR BLD AUTO: 39.5 % (ref 37.5–51)
HGB BLD-MCNC: 13.2 G/DL (ref 13–17.7)
INR PPP: 1.06 (ref 0.91–1.09)
LYMPHOCYTES # BLD AUTO: 1.77 10*3/MM3 (ref 0.7–3.1)
LYMPHOCYTES NFR BLD AUTO: 38.5 % (ref 19.6–45.3)
MCH RBC QN AUTO: 31.8 PG (ref 26.6–33)
MCHC RBC AUTO-ENTMCNC: 33.4 G/DL (ref 31.5–35.7)
MCV RBC AUTO: 95.2 FL (ref 79–97)
MONOCYTES # BLD AUTO: 0.6 10*3/MM3 (ref 0.1–0.9)
MONOCYTES NFR BLD AUTO: 13 % (ref 5–12)
NEUTROPHILS NFR BLD AUTO: 2.07 10*3/MM3 (ref 1.7–7)
NEUTROPHILS NFR BLD AUTO: 45 % (ref 42.7–76)
PLATELET # BLD AUTO: 139 10*3/MM3 (ref 140–450)
PMV BLD AUTO: 10.8 FL (ref 6–12)
POTASSIUM SERPL-SCNC: 3.8 MMOL/L (ref 3.5–5.2)
PROTHROMBIN TIME: 13.4 SECONDS (ref 11.9–14.6)
RBC # BLD AUTO: 4.15 10*6/MM3 (ref 4.14–5.8)
SODIUM SERPL-SCNC: 142 MMOL/L (ref 136–145)
WBC NRBC COR # BLD: 4.6 10*3/MM3 (ref 3.4–10.8)

## 2022-04-11 PROCEDURE — 25010000002 HEPARIN (PORCINE) 1000-0.9 UT/500ML-% SOLUTION: Performed by: INTERNAL MEDICINE

## 2022-04-11 PROCEDURE — 99152 MOD SED SAME PHYS/QHP 5/>YRS: CPT | Performed by: INTERNAL MEDICINE

## 2022-04-11 PROCEDURE — C1894 INTRO/SHEATH, NON-LASER: HCPCS | Performed by: INTERNAL MEDICINE

## 2022-04-11 PROCEDURE — C1760 CLOSURE DEV, VASC: HCPCS | Performed by: INTERNAL MEDICINE

## 2022-04-11 PROCEDURE — 0 IOPAMIDOL PER 1 ML: Performed by: INTERNAL MEDICINE

## 2022-04-11 PROCEDURE — 85610 PROTHROMBIN TIME: CPT | Performed by: NURSE PRACTITIONER

## 2022-04-11 PROCEDURE — 25010000002 FENTANYL CITRATE (PF) 50 MCG/ML SOLUTION: Performed by: INTERNAL MEDICINE

## 2022-04-11 PROCEDURE — 25010000002 MIDAZOLAM PER 1 MG: Performed by: INTERNAL MEDICINE

## 2022-04-11 PROCEDURE — 93459 L HRT ART/GRFT ANGIO: CPT | Performed by: INTERNAL MEDICINE

## 2022-04-11 PROCEDURE — 99153 MOD SED SAME PHYS/QHP EA: CPT | Performed by: INTERNAL MEDICINE

## 2022-04-11 PROCEDURE — C1769 GUIDE WIRE: HCPCS | Performed by: INTERNAL MEDICINE

## 2022-04-11 PROCEDURE — 85025 COMPLETE CBC W/AUTO DIFF WBC: CPT | Performed by: NURSE PRACTITIONER

## 2022-04-11 PROCEDURE — 80048 BASIC METABOLIC PNL TOTAL CA: CPT | Performed by: NURSE PRACTITIONER

## 2022-04-11 PROCEDURE — 25010000002 HEPARIN (PORCINE) 2000-0.9 UNIT/L-% SOLUTION: Performed by: INTERNAL MEDICINE

## 2022-04-11 RX ORDER — LIDOCAINE HYDROCHLORIDE 20 MG/ML
INJECTION, SOLUTION INFILTRATION; PERINEURAL AS NEEDED
Status: DISCONTINUED | OUTPATIENT
Start: 2022-04-11 | End: 2022-04-11 | Stop reason: HOSPADM

## 2022-04-11 RX ORDER — FENTANYL CITRATE 50 UG/ML
INJECTION, SOLUTION INTRAMUSCULAR; INTRAVENOUS AS NEEDED
Status: DISCONTINUED | OUTPATIENT
Start: 2022-04-11 | End: 2022-04-11 | Stop reason: HOSPADM

## 2022-04-11 RX ORDER — MIDAZOLAM HYDROCHLORIDE 1 MG/ML
INJECTION INTRAMUSCULAR; INTRAVENOUS AS NEEDED
Status: DISCONTINUED | OUTPATIENT
Start: 2022-04-11 | End: 2022-04-11 | Stop reason: HOSPADM

## 2022-04-11 RX ORDER — HEPARIN SODIUM 200 [USP'U]/100ML
INJECTION, SOLUTION INTRAVENOUS AS NEEDED
Status: DISCONTINUED | OUTPATIENT
Start: 2022-04-11 | End: 2022-04-11 | Stop reason: HOSPADM

## 2022-04-11 RX ORDER — ACETAMINOPHEN 325 MG/1
650 TABLET ORAL EVERY 4 HOURS PRN
Status: DISCONTINUED | OUTPATIENT
Start: 2022-04-11 | End: 2022-04-11 | Stop reason: HOSPADM

## 2022-04-11 RX ORDER — SODIUM CHLORIDE 0.9 % (FLUSH) 0.9 %
3 SYRINGE (ML) INJECTION EVERY 12 HOURS SCHEDULED
Status: DISCONTINUED | OUTPATIENT
Start: 2022-04-11 | End: 2022-04-11 | Stop reason: HOSPADM

## 2022-04-11 RX ORDER — SODIUM CHLORIDE 0.9 % (FLUSH) 0.9 %
10 SYRINGE (ML) INJECTION EVERY 12 HOURS SCHEDULED
Status: DISCONTINUED | OUTPATIENT
Start: 2022-04-11 | End: 2022-04-11 | Stop reason: HOSPADM

## 2022-04-11 RX ORDER — SODIUM CHLORIDE 0.9 % (FLUSH) 0.9 %
10 SYRINGE (ML) INJECTION AS NEEDED
Status: DISCONTINUED | OUTPATIENT
Start: 2022-04-11 | End: 2022-04-11 | Stop reason: HOSPADM

## 2022-04-11 RX ORDER — SODIUM CHLORIDE 9 MG/ML
100 INJECTION, SOLUTION INTRAVENOUS CONTINUOUS
Status: DISCONTINUED | OUTPATIENT
Start: 2022-04-11 | End: 2022-04-11 | Stop reason: HOSPADM

## 2022-04-11 RX ORDER — SODIUM CHLORIDE 0.9 % (FLUSH) 0.9 %
3-10 SYRINGE (ML) INJECTION AS NEEDED
Status: DISCONTINUED | OUTPATIENT
Start: 2022-04-11 | End: 2022-04-11 | Stop reason: HOSPADM

## 2022-04-13 ENCOUNTER — OFFICE VISIT (OUTPATIENT)
Dept: CARDIOLOGY | Facility: CLINIC | Age: 67
End: 2022-04-13

## 2022-04-13 ENCOUNTER — APPOINTMENT (OUTPATIENT)
Dept: CARDIAC REHAB | Facility: HOSPITAL | Age: 67
End: 2022-04-13

## 2022-04-13 VITALS
BODY MASS INDEX: 36.3 KG/M2 | DIASTOLIC BLOOD PRESSURE: 90 MMHG | OXYGEN SATURATION: 94 % | HEART RATE: 60 BPM | HEIGHT: 72 IN | SYSTOLIC BLOOD PRESSURE: 162 MMHG | WEIGHT: 268 LBS

## 2022-04-13 DIAGNOSIS — I25.118 CORONARY ARTERY DISEASE WITH EXERTIONAL ANGINA: ICD-10-CM

## 2022-04-13 DIAGNOSIS — I25.708 CORONARY ARTERY DISEASE OF BYPASS GRAFT OF NATIVE HEART WITH STABLE ANGINA PECTORIS: ICD-10-CM

## 2022-04-13 DIAGNOSIS — Z95.1 S/P CABG X 2: ICD-10-CM

## 2022-04-13 DIAGNOSIS — I20.8 EXERTIONAL ANGINA: Primary | ICD-10-CM

## 2022-04-13 DIAGNOSIS — I25.728 ATHEROSCLEROSIS OF AUTOLOGOUS ARTERY CORONARY ARTERY BYPASS GRAFT(S) WITH OTHER FORMS OF ANGINA PECTORIS: ICD-10-CM

## 2022-04-13 DIAGNOSIS — Z95.5 STENTED CORONARY ARTERY: ICD-10-CM

## 2022-04-13 PROCEDURE — 99214 OFFICE O/P EST MOD 30 MIN: CPT | Performed by: INTERNAL MEDICINE

## 2022-04-13 RX ORDER — AMLODIPINE BESYLATE 5 MG/1
5 TABLET ORAL DAILY
Qty: 90 TABLET | Refills: 3 | Status: SHIPPED | OUTPATIENT
Start: 2022-04-13 | End: 2022-04-28 | Stop reason: HOSPADM

## 2022-04-13 RX ORDER — ISOSORBIDE MONONITRATE 120 MG/1
120 TABLET, EXTENDED RELEASE ORAL 2 TIMES DAILY
Qty: 180 TABLET | Refills: 3 | Status: ON HOLD | OUTPATIENT
Start: 2022-04-13 | End: 2022-04-28 | Stop reason: SDUPTHER

## 2022-04-13 NOTE — PROGRESS NOTES
Lio Velasquez  [id number]  1955  66 y.o.  male    Referring Provider: Bridger Rosales MD    Reason for Follow-up Visit: Here for routine follow up   Prior to last visit seen after dobutamine stress echo  CAD S/P CABG X2 S/P stenting of SVG x2 last 7/16  S/P 2017  left ankle replacement Dr Alanna Dias [facility]  Now with recurrent chest pain, classic angina pectoris  coronary artery disease stented coronary artery     Subjective     Chest pain both with exertion    Moderate substernal,   Pressure like   Chest pain non pleuritic  Chest pain non positional and non gustatory   Lasts less than 5 minutes    Started more than 3 weeks ago  Occurs daily on the average but can be variable in frequency  No associated diaphoresis    No associated nausea  No radiation    Relieved with rest or NTG   Not positional    No change with intake of food or antacids  No change with breathing  Mild to moderate associated dyspnea    No similar chest pain episodes in the past     No bleeding, excessive bruising, gait instability or fall risks    Had cath   Groin healed well and no issues other than minor soreness     History of present illness:  Lio Velasquez is a 66 y.o. yo male with  coronary artery disease Coronary artery bypass grafting stented coronary artery  who presents today for   Chief Complaint   Patient presents with   • Coronary Artery Disease     S/p cath- no issues with cath site    .    History  Past Medical History:   Diagnosis Date   • Aortocoronary bypass status    • CAD in native artery    • Chest pain    • Coronary angioplasty status    • Hyperlipidemia     unspecified   • Hypertension, benign    • Obesity    • Sleep apnea     unspecified   ,   Past Surgical History:   Procedure Laterality Date   • ANKLE SURGERY      with screws    • CARDIAC CATHETERIZATION Left 03/05/2012 7/2016   • CARDIAC CATHETERIZATION Left 08/12/2019    Procedure: Cardiac Catheterization/Vascular Study BRIANNA OK;   Surgeon: Storm Encinas MD;  Location:  [facility];  Service: Cardiology   • CARDIAC CATHETERIZATION Left 10/04/2020    Procedure: Cardiac Catheterization/Vascular Study 6F Right groin ;  Surgeon: Storm Encinas MD;  Location:  [facility] CATH [facility];  Service: Cardiology;  Laterality: Left;   • CARDIAC CATHETERIZATION N/A 4/11/2022    Procedure: Left Heart Cath;  Surgeon: Storm Encinas MD;  Location:  [facility] CATH INV[facility];  Service: Cardiology;  Laterality: N/A;   • COLONOSCOPY  12/30/2008   • COLONOSCOPY  12/30/2013    two polyps removed from the sigmoid colon   • COLONOSCOPY N/A 06/21/2019    Procedure: COLONOSCOPY WITH ANESTHESIA;  Surgeon: Marciano Jaramillo DO;  Location:  [facility] ENDOSCOPY;  Service: Gastroenterology   • CORONARY ARTERY BYPASS GRAFT  1993    x2   • CORONARY ARTERY BYPASS GRAFT  10/17/2021   • CORONARY STENT PLACEMENT  2019    X 3   • JOINT REPLACEMENT Left     left ankle   • SHOULDER SURGERY Right     with chano   • WRIST SURGERY Left    ,   Family History   Problem Relation Age of Onset   • Diabetes Mother         type II   • Heart failure Mother         congestive heart failure   • Colon polyps Neg Hx    • Esophageal cancer Neg Hx    • Rectal cancer Neg Hx    • Colon cancer Neg Hx    ,   Social History     Tobacco Use   • Smoking status: Never Smoker   • Smokeless tobacco: Never Used   Vaping Use   • Vaping Use: Never used   Substance Use Topics   • Alcohol use: Not Currently     Comment: occasional   • Drug use: No   ,     Medications  Current Outpatient Medications   Medication Sig Dispense Refill   • aspirin 81 MG tablet Take 81 mg by mouth every night.     • atorvastatin (LIPITOR) 40 MG tablet Take 1 tablet by mouth Daily. 90 tablet 3   • carvedilol (COREG) 12.5 MG tablet Take 1 tablet by mouth 2 (Two) Times a Day. 180 tablet 3   • clopidogrel (PLAVIX) 75 MG tablet Take 1 tablet by mouth Daily. 90 tablet 3   • folic acid-pyridoxine-cyanocobalamin (FOLBIC)  "2.5-25-2 MG tablet tablet Take 1 tablet by mouth every night.     • isosorbide mononitrate (IMDUR) 120 MG 24 hr tablet Take 1 tablet by mouth 2 (Two) Times a Day. 180 tablet 3   • Multiple Vitamins-Minerals (CENTRUM CARDIO PO) Take 1 tablet by mouth Every Night.     • nitroglycerin (NITROSTAT) 0.4 MG SL tablet 1 under the tongue as needed for angina, may repeat q5mins for up three doses 100 tablet 11   • ramipril (ALTACE) 2.5 MG capsule Take 1 capsule by mouth Every Night. 90 capsule 3   • amLODIPine (NORVASC) 5 MG tablet Take 1 tablet by mouth Daily. 90 tablet 3     No current facility-administered medications for this visit.       Allergies:  Demerol [meperidine] and Morphine and related    Review of Systems  Review of Systems   Constitutional: Positive for malaise/fatigue.   HENT: Negative.    Eyes: Negative.    Cardiovascular: Positive for chest pain and dyspnea on exertion. Negative for claudication, cyanosis, irregular heartbeat, leg swelling, near-syncope, orthopnea, palpitations, paroxysmal nocturnal dyspnea and syncope.   Respiratory: Negative.    Endocrine: Negative.    Hematologic/Lymphatic: Negative.    Skin: Negative.    Musculoskeletal: Positive for arthritis, back pain, joint pain and stiffness.   Gastrointestinal: Negative for anorexia.   Genitourinary: Negative.    Neurological: Positive for weakness.   Psychiatric/Behavioral: Negative.        Objective     Physical Exam:  /90   Pulse 60   Ht 182.9 cm (72\")   Wt 122 kg (268 lb)   SpO2 94%   BMI 36.35 kg/m²   Physical Exam   Constitutional: He appears well-developed.   HENT:   Head: Normocephalic.   Neck: Normal carotid pulses and no JVD present. No tracheal tenderness present. Carotid bruit is not present. No tracheal deviation present.   Cardiovascular: Regular rhythm, normal heart sounds and normal pulses.   Pulmonary/Chest: Effort normal. No stridor.   Abdominal: Soft. He exhibits no distension. There is no abdominal tenderness. "     Vascular Status -  His right foot exhibits abnormal foot edema. His left foot exhibits abnormal foot edema.  Neurological: He is alert. No cranial nerve deficit or sensory deficit.   Skin: Skin is warm.   Psychiatric: His speech is normal and behavior is normal.        Results Review:      Conclusion of cardiac catheterization    4/11/2022        Coronary angiography:     Slight eccentric takeoff of the left main coronary artery  No significant dampening of pressure waveforms  Good reflux of contrast denoting no significant stenosis of ostial left main coronary artery  Ostial left main coronary artery has approximately a 20% stenosis  Occluded proximal left anterior descending coronary artery  No significant disease of the left circumflex coronary artery proximally however mid to distal portion is occluded  With likely faint vessel to vessel collateral with delayed filling of this vessel  Small ramus intermedius branch has ostial 70% stenosis  Right coronary artery is occluded in the midsegment with vessel to vessel collaterals  There is an occluded saphenous venous graft from prior surgery with multiple stents that is completely occluded and visualized only as the stent remnants  There is a new saphenous venous graft that is patent however at the anastomotic site there is subtotal occlusion with the right posterior descending coronary artery being only half a millimeter in diameter and not suitable for revascularization  Patent left intramammary artery graft to the left anterior descending coronary artery with retrograde flow into a small to moderate long diagonal branch     Left heart cath: LVEDP 28 mm Hg with no gradient across aortic valve on pullback.      LV Gram:Left ventricular ejection fraction of approximately 40-45% with akinesis of the mid and basal inferior wall and hypokinesis of the distal inferior and inferior  wall           ____________________________________________________________________________________________________________________________________________     Plan after cardiac catheterization     Considered Lexiscan Cardiolite stress test  Possible additional revascularization including consideration for chronic total occlusion of left circumflex coronary artery versus medical therapy and consideration for EECP  Usual post procedure precautions after arteriotomy including monitoring for signs both local and systemic side effects.  On ultimate discharge will receive printed instructions  Intensive risk factor modifications for both primary and secondary prevention if applicable  Hydration   Observation      Results for orders placed during the hospital encounter of 10/03/20    Adult Transthoracic Echo Complete W/ Cont if Necessary Per Protocol    Interpretation Summary  · Left ventricular ejection fraction appears to be 56 - 60%. Left ventricular systolic function is normal.  · Normal diastolic function  · No pulmonary hypertension   cath 8/12/19     Conclusion of cardiac catheterization     Severe sequential stenosis of mid and distal saphenous venous graft to right coronary artery treated primary stenting using 3.5 x 38 mm Jamee drug-eluting stent  PTCA of the distal right coronary artery followed by implantation of 3.5 x 23 mm Jamee drug-eluting stent.  The very distal portion of the saphenous venous graft to the right coronary artery has a 50% stenosis.  ROSS-3 flow before and after procedure.  No symptoms.  Filter wire was used.        ____________________________________________________________________________________________________________________________________________     Plan after cardiac catheterization        Discontinue Plavix  Start Brilinta 180 mg now followed by 90 mg p.o. twice daily  Stop Zocor and start Crestor 20 mg daily  Keep LDL well below 70 mg/dL  Given multiple stent placement to  saphenous venous graft to the right coronary artery much higher incidence of acute stent thrombosis.  If he has any chest pain to come to the emergency room immediately.     Intensive risk factor modifications for both primary and secondary prevention if applicable  Hydration   Observation        Procedures      ____________________________________________________________________________________________________________________________________________  Health maintenance and recommendations      Similar recommendations as last visit   Offered to give patient  a copy      Questions were encouraged, asked and answered to the patient's  understanding and satisfaction. Questions if any regarding current medications and side effects, need for refills and importance of compliance to medications stressed.    Reviewed available prior notes, consults, prior visits, laboratory findings, radiology and cardiology relevant reports. Updated chart as applicable. I have reviewed the patient's medical history in detail and updated the computerized patient record as relevant.      Updated patient regarding any new or relevant abnormalities on review of records or any new findings on physical exam. Mentioned to patient about purpose of visit and desirable health short and long term goals and objectives.    Primary to monitor CBC CMP Lipid panel and TSH as applicable    ___________________________________________________________________________________________________________________________________________        Diagnoses and all orders for this visit:    1. Exertional angina (HCC) (Primary)  -     Stress Test With Myocardial Perfusion (1 Day); Future  -     External Counter Pulsation; Future    2. S/P CABG x 2    3. Stented coronary artery     4. Coronary artery disease with exertional angina (HCC)    5. Coronary artery disease of bypass graft of native heart with stable angina pectoris (HCC)    6. BMI 36.0-36.9,adult    7.  Atherosclerosis of autologous artery coronary artery bypass graft(s) with other forms of angina pectoris (HCC)   -     External Counter Pulsation; Future    Other orders  -     isosorbide mononitrate (IMDUR) 120 MG 24 hr tablet; Take 1 tablet by mouth 2 (Two) Times a Day.  Dispense: 180 tablet; Refill: 3  -     amLODIPine (NORVASC) 5 MG tablet; Take 1 tablet by mouth Daily.  Dispense: 90 tablet; Refill: 3           Plan      Patient expressed understanding  Encouraged and answered all questions   Discussed with the patient and all questioned fully answered. He will call me if any problems arise.   Discussed results of prior testing with patient : cath and reviewed images     Escalate antianginal therapy with short term follow up    Requested Prescriptions     Signed Prescriptions Disp Refills   • isosorbide mononitrate (IMDUR) 120 MG 24 hr tablet 180 tablet 3     Sig: Take 1 tablet by mouth 2 (Two) Times a Day.   • amLODIPine (NORVASC) 5 MG tablet 90 tablet 3     Sig: Take 1 tablet by mouth Daily.    Imdur BID 8 hours apart    S/L NTG PRN for chest pain, call me or go to ER if has to use S/L nitroglycerin      Keep LDL below 70 mg/dl. Monitor liver and renal functions.   Monitor CBC, CMP, TSH (as indicated) and Lipid Panel by primary      Conitnue CPAP       Orders Placed This Encounter   Procedures   • Stress Test With Myocardial Perfusion (1 Day)     Standing Status:   Future     Standing Expiration Date:   4/13/2023     Order Specific Question:   What stress agent will be used?     Answer:   Regadenoson (Lexiscan)     Order Specific Question:   Difficulty walking criteria?     Answer:   Musculoskeletal (hips, knees, feet, back, amputee)     Order Specific Question:   Reason for exam?     Answer:   Chest Pain     Order Specific Question:   Release to patient     Answer:   Immediate   • External Counter Pulsation     [facility]     Standing Status:   Future     Standing Expiration Date:    4/13/2023     Order Specific Question:   Reason for Exam:     Answer:   cp     Order Specific Question:   Release to patient     Answer:   Immediate      Depending on results of myocardial perfusion scan  Plan further threrapies     Refer for EECP [facility]         Return in about 4 weeks (around 5/11/2022).

## 2022-04-14 ENCOUNTER — HOSPITAL ENCOUNTER (OUTPATIENT)
Dept: CARDIOLOGY | Facility: HOSPITAL | Age: 67
Discharge: HOME | End: 2022-04-14

## 2022-04-14 VITALS
SYSTOLIC BLOOD PRESSURE: 122 MMHG | WEIGHT: 263 LBS | HEART RATE: 56 BPM | HEIGHT: 72 IN | BODY MASS INDEX: 35.62 KG/M2 | DIASTOLIC BLOOD PRESSURE: 74 MMHG

## 2022-04-14 DIAGNOSIS — I20.8 EXERTIONAL ANGINA: ICD-10-CM

## 2022-04-14 LAB
BH CV REST NUCLEAR ISOTOPE DOSE: 10.7 MCI
BH CV STRESS BP STAGE 1: NORMAL
BH CV STRESS COMMENTS STAGE 1: NORMAL
BH CV STRESS DOSE REGADENOSON STAGE 1: 0.4
BH CV STRESS DURATION MIN STAGE 1: 0
BH CV STRESS DURATION SEC STAGE 1: 10
BH CV STRESS HR STAGE 1: 79
BH CV STRESS NUCLEAR ISOTOPE DOSE: 31.7 MCI
BH CV STRESS PROTOCOL 1: NORMAL
BH CV STRESS RECOVERY BP: NORMAL MMHG
BH CV STRESS RECOVERY HR: 70 BPM
BH CV STRESS STAGE 1: 1
LV EF NUC BP: 67 %
MAXIMAL PREDICTED HEART RATE: 155 BPM
PERCENT MAX PREDICTED HR: 50.97 %
STRESS BASELINE BP: NORMAL MMHG
STRESS BASELINE HR: 54 BPM
STRESS PERCENT HR: 60 %
STRESS POST EXERCISE DUR MIN: 0 MIN
STRESS POST EXERCISE DUR SEC: 10 SEC
STRESS POST PEAK BP: NORMAL MMHG
STRESS POST PEAK HR: 79 BPM
STRESS TARGET HR: 132 BPM

## 2022-04-14 PROCEDURE — 93017 CV STRESS TEST TRACING ONLY: CPT

## 2022-04-14 PROCEDURE — 78452 HT MUSCLE IMAGE SPECT MULT: CPT

## 2022-04-14 PROCEDURE — 78452 HT MUSCLE IMAGE SPECT MULT: CPT | Performed by: INTERNAL MEDICINE

## 2022-04-14 PROCEDURE — A9500 TC99M SESTAMIBI: HCPCS | Performed by: INTERNAL MEDICINE

## 2022-04-14 PROCEDURE — 93018 CV STRESS TEST I&R ONLY: CPT | Performed by: INTERNAL MEDICINE

## 2022-04-14 PROCEDURE — 0 TECHNETIUM SESTAMIBI: Performed by: INTERNAL MEDICINE

## 2022-04-14 PROCEDURE — 25010000002 REGADENOSON 0.4 MG/5ML SOLUTION: Performed by: INTERNAL MEDICINE

## 2022-04-14 RX ADMIN — TECHNETIUM TC 99M SESTAMIBI 1 DOSE: 1 INJECTION INTRAVENOUS at 12:00

## 2022-04-14 RX ADMIN — REGADENOSON 0.4 MG: 0.08 INJECTION, SOLUTION INTRAVENOUS at 12:56

## 2022-04-14 RX ADMIN — TECHNETIUM TC 99M SESTAMIBI 1 DOSE: 1 INJECTION INTRAVENOUS at 13:02

## 2022-04-15 ENCOUNTER — APPOINTMENT (OUTPATIENT)
Dept: CARDIAC REHAB | Facility: HOSPITAL | Age: 67
End: 2022-04-15

## 2022-04-15 ENCOUNTER — HOSPITAL ENCOUNTER (EMERGENCY)
Facility: HOSPITAL | Age: 67
Discharge: HOME | End: 2022-04-15
Admitting: EMERGENCY MEDICINE

## 2022-04-15 ENCOUNTER — APPOINTMENT (OUTPATIENT)
Dept: GENERAL RADIOLOGY | Facility: HOSPITAL | Age: 67
End: 2022-04-15

## 2022-04-15 VITALS
WEIGHT: 267 LBS | TEMPERATURE: 98.4 F | SYSTOLIC BLOOD PRESSURE: 116 MMHG | RESPIRATION RATE: 16 BRPM | BODY MASS INDEX: 36.16 KG/M2 | HEART RATE: 77 BPM | HEIGHT: 72 IN | DIASTOLIC BLOOD PRESSURE: 68 MMHG | OXYGEN SATURATION: 94 %

## 2022-04-15 DIAGNOSIS — R06.00 DYSPNEA, UNSPECIFIED TYPE: ICD-10-CM

## 2022-04-15 DIAGNOSIS — M54.6 ACUTE LEFT-SIDED THORACIC BACK PAIN: Primary | ICD-10-CM

## 2022-04-15 DIAGNOSIS — R60.0 PEDAL EDEMA: ICD-10-CM

## 2022-04-15 LAB
ALBUMIN SERPL-MCNC: 4.1 G/DL (ref 3.5–5.2)
ALBUMIN/GLOB SERPL: 2.1 G/DL
ALP SERPL-CCNC: 79 U/L (ref 39–117)
ALT SERPL W P-5'-P-CCNC: 27 U/L (ref 1–41)
ANION GAP SERPL CALCULATED.3IONS-SCNC: 7 MMOL/L (ref 5–15)
AST SERPL-CCNC: 22 U/L (ref 1–40)
BACTERIA UR QL AUTO: ABNORMAL /HPF
BASOPHILS # BLD AUTO: 0.05 10*3/MM3 (ref 0–0.2)
BASOPHILS NFR BLD AUTO: 0.7 % (ref 0–1.5)
BILIRUB SERPL-MCNC: 0.5 MG/DL (ref 0–1.2)
BILIRUB UR QL STRIP: NEGATIVE
BUN SERPL-MCNC: 11 MG/DL (ref 8–23)
BUN/CREAT SERPL: 9.4 (ref 7–25)
CALCIUM SPEC-SCNC: 9.1 MG/DL (ref 8.6–10.5)
CHLORIDE SERPL-SCNC: 107 MMOL/L (ref 98–107)
CLARITY UR: CLEAR
CO2 SERPL-SCNC: 24 MMOL/L (ref 22–29)
COLOR UR: ABNORMAL
CREAT SERPL-MCNC: 1.17 MG/DL (ref 0.76–1.27)
D DIMER PPP FEU-MCNC: 0.32 MG/L (FEU) (ref 0–0.5)
DEPRECATED RDW RBC AUTO: 45.2 FL (ref 37–54)
EGFRCR SERPLBLD CKD-EPI 2021: 69.2 ML/MIN/1.73
EOSINOPHIL # BLD AUTO: 0.06 10*3/MM3 (ref 0–0.4)
EOSINOPHIL NFR BLD AUTO: 0.8 % (ref 0.3–6.2)
ERYTHROCYTE [DISTWIDTH] IN BLOOD BY AUTOMATED COUNT: 13.2 % (ref 12.3–15.4)
GLOBULIN UR ELPH-MCNC: 2 GM/DL
GLUCOSE SERPL-MCNC: 158 MG/DL (ref 65–99)
GLUCOSE UR STRIP-MCNC: NEGATIVE MG/DL
HCT VFR BLD AUTO: 35.6 % (ref 37.5–51)
HGB BLD-MCNC: 12 G/DL (ref 13–17.7)
HGB UR QL STRIP.AUTO: ABNORMAL
HYALINE CASTS UR QL AUTO: ABNORMAL /LPF
INR PPP: 1.05 (ref 0.91–1.09)
KETONES UR QL STRIP: ABNORMAL
LEUKOCYTE ESTERASE UR QL STRIP.AUTO: NEGATIVE
LIPASE SERPL-CCNC: 13 U/L (ref 13–60)
LYMPHOCYTES # BLD AUTO: 1.11 10*3/MM3 (ref 0.7–3.1)
LYMPHOCYTES NFR BLD AUTO: 14.5 % (ref 19.6–45.3)
MCH RBC QN AUTO: 31.7 PG (ref 26.6–33)
MCHC RBC AUTO-ENTMCNC: 33.7 G/DL (ref 31.5–35.7)
MCV RBC AUTO: 94.2 FL (ref 79–97)
MONOCYTES # BLD AUTO: 0.63 10*3/MM3 (ref 0.1–0.9)
MONOCYTES NFR BLD AUTO: 8.2 % (ref 5–12)
NEUTROPHILS NFR BLD AUTO: 5.81 10*3/MM3 (ref 1.7–7)
NEUTROPHILS NFR BLD AUTO: 75.5 % (ref 42.7–76)
NITRITE UR QL STRIP: NEGATIVE
NT-PROBNP SERPL-MCNC: 157.8 PG/ML (ref 0–900)
PH UR STRIP.AUTO: 5.5 [PH] (ref 5–8)
PLATELET # BLD AUTO: 133 10*3/MM3 (ref 140–450)
PMV BLD AUTO: 11.8 FL (ref 6–12)
POTASSIUM SERPL-SCNC: 4.6 MMOL/L (ref 3.5–5.2)
PROT SERPL-MCNC: 6.1 G/DL (ref 6–8.5)
PROT UR QL STRIP: NEGATIVE
PROTHROMBIN TIME: 13.3 SECONDS (ref 11.9–14.6)
RBC # BLD AUTO: 3.78 10*6/MM3 (ref 4.14–5.8)
RBC # UR STRIP: ABNORMAL /HPF
REF LAB TEST METHOD: ABNORMAL
SARS-COV-2 RNA PNL SPEC NAA+PROBE: NOT DETECTED
SODIUM SERPL-SCNC: 138 MMOL/L (ref 136–145)
SP GR UR STRIP: 1.02 (ref 1–1.03)
SQUAMOUS #/AREA URNS HPF: ABNORMAL /HPF
TROPONIN T SERPL-MCNC: <0.01 NG/ML (ref 0–0.03)
TROPONIN T SERPL-MCNC: <0.01 NG/ML (ref 0–0.03)
UROBILINOGEN UR QL STRIP: ABNORMAL
WBC # UR STRIP: ABNORMAL /HPF
WBC NRBC COR # BLD: 7.68 10*3/MM3 (ref 3.4–10.8)

## 2022-04-15 PROCEDURE — 83690 ASSAY OF LIPASE: CPT | Performed by: NURSE PRACTITIONER

## 2022-04-15 PROCEDURE — 84484 ASSAY OF TROPONIN QUANT: CPT | Performed by: NURSE PRACTITIONER

## 2022-04-15 PROCEDURE — 36415 COLL VENOUS BLD VENIPUNCTURE: CPT

## 2022-04-15 PROCEDURE — 87635 SARS-COV-2 COVID-19 AMP PRB: CPT | Performed by: NURSE PRACTITIONER

## 2022-04-15 PROCEDURE — 80053 COMPREHEN METABOLIC PANEL: CPT | Performed by: NURSE PRACTITIONER

## 2022-04-15 PROCEDURE — 74018 RADEX ABDOMEN 1 VIEW: CPT

## 2022-04-15 PROCEDURE — 96374 THER/PROPH/DIAG INJ IV PUSH: CPT

## 2022-04-15 PROCEDURE — 71045 X-RAY EXAM CHEST 1 VIEW: CPT

## 2022-04-15 PROCEDURE — 85379 FIBRIN DEGRADATION QUANT: CPT | Performed by: NURSE PRACTITIONER

## 2022-04-15 PROCEDURE — 25010000002 FENTANYL CITRATE (PF) 50 MCG/ML SOLUTION: Performed by: NURSE PRACTITIONER

## 2022-04-15 PROCEDURE — 83880 ASSAY OF NATRIURETIC PEPTIDE: CPT | Performed by: NURSE PRACTITIONER

## 2022-04-15 PROCEDURE — 93010 ELECTROCARDIOGRAM REPORT: CPT | Performed by: INTERNAL MEDICINE

## 2022-04-15 PROCEDURE — 99284 EMERGENCY DEPT VISIT MOD MDM: CPT

## 2022-04-15 PROCEDURE — 0 HYDROMORPHONE 1 MG/ML SOLUTION: Performed by: NURSE PRACTITIONER

## 2022-04-15 PROCEDURE — 25010000002 ONDANSETRON PER 1 MG: Performed by: NURSE PRACTITIONER

## 2022-04-15 PROCEDURE — 25010000002 DIPHENHYDRAMINE PER 50 MG: Performed by: NURSE PRACTITIONER

## 2022-04-15 PROCEDURE — 93005 ELECTROCARDIOGRAM TRACING: CPT | Performed by: EMERGENCY MEDICINE

## 2022-04-15 PROCEDURE — 85025 COMPLETE CBC W/AUTO DIFF WBC: CPT | Performed by: NURSE PRACTITIONER

## 2022-04-15 PROCEDURE — 85610 PROTHROMBIN TIME: CPT | Performed by: NURSE PRACTITIONER

## 2022-04-15 PROCEDURE — 96375 TX/PRO/DX INJ NEW DRUG ADDON: CPT

## 2022-04-15 PROCEDURE — 96376 TX/PRO/DX INJ SAME DRUG ADON: CPT

## 2022-04-15 PROCEDURE — 81001 URINALYSIS AUTO W/SCOPE: CPT | Performed by: NURSE PRACTITIONER

## 2022-04-15 RX ORDER — ONDANSETRON 2 MG/ML
4 INJECTION INTRAMUSCULAR; INTRAVENOUS ONCE
Status: COMPLETED | OUTPATIENT
Start: 2022-04-15 | End: 2022-04-15

## 2022-04-15 RX ORDER — DIPHENHYDRAMINE HYDROCHLORIDE 50 MG/ML
25 INJECTION INTRAMUSCULAR; INTRAVENOUS ONCE
Status: COMPLETED | OUTPATIENT
Start: 2022-04-15 | End: 2022-04-15

## 2022-04-15 RX ORDER — SODIUM CHLORIDE 0.9 % (FLUSH) 0.9 %
10 SYRINGE (ML) INJECTION AS NEEDED
Status: DISCONTINUED | OUTPATIENT
Start: 2022-04-15 | End: 2022-04-15 | Stop reason: HOSPADM

## 2022-04-15 RX ORDER — FENTANYL CITRATE 50 UG/ML
50 INJECTION, SOLUTION INTRAMUSCULAR; INTRAVENOUS ONCE
Status: COMPLETED | OUTPATIENT
Start: 2022-04-15 | End: 2022-04-15

## 2022-04-15 RX ADMIN — DIPHENHYDRAMINE HYDROCHLORIDE 25 MG: 50 INJECTION, SOLUTION INTRAMUSCULAR; INTRAVENOUS at 16:45

## 2022-04-15 RX ADMIN — FENTANYL CITRATE 50 MCG: 50 INJECTION INTRAMUSCULAR; INTRAVENOUS at 11:48

## 2022-04-15 RX ADMIN — HYDROMORPHONE HYDROCHLORIDE 1 MG: 1 INJECTION, SOLUTION INTRAMUSCULAR; INTRAVENOUS; SUBCUTANEOUS at 16:45

## 2022-04-15 RX ADMIN — ONDANSETRON 4 MG: 2 INJECTION INTRAMUSCULAR; INTRAVENOUS at 11:48

## 2022-04-15 RX ADMIN — ONDANSETRON 4 MG: 2 INJECTION INTRAMUSCULAR; INTRAVENOUS at 16:44

## 2022-04-15 RX ADMIN — FENTANYL CITRATE 50 MCG: 50 INJECTION INTRAMUSCULAR; INTRAVENOUS at 13:41

## 2022-04-15 NOTE — ED PROVIDER NOTES
Subjective   66 yom with PMH of hyperlipidemia, CAD and HTN presents with wife with c/o left upper back pain as well as SOB. He also feels weak and dizzy particularly when he stands. He has had some nausea. He has been having these symptoms since 10 last night. He wants to add he has had kidney stones in the past but he did not feel like this.      His wife states his cardiologist, Dr Encinas, referred him to [facility] in [place] where he had a redo 1 vessel CABG to RCA in 10/2021.  Recently, he was attending cardiac rehab when he began to c/o chest heaviness and arm pain while on the treadmill.  He described it as similar to angina.  He was also noted to have ST depression on exertion that returned to his baseline at rest.  He was evaluated by cardiology where a heart catheterization was completed on 04/11/22.  He then had a nuclear stress test on 04/14/22.     Nuclear stress test completed on 04/14/22 - Left ventricular ejection fraction is normal. (Calculated EF = 67%).  Moderate inferior lateral ischemia    Please refer to bull's-eye diagram    Diaphragmatic attenuation artifact is present.    Cardiac catheterization - 04/11/22 - Conclusion of cardiac catheterization       Slight eccentric takeoff of the left main coronary artery  No significant dampening of pressure waveforms  Good reflux of contrast denoting no significant stenosis of ostial left main coronary artery  Ostial left main coronary artery has approximately a 20% stenosis  Occluded proximal left anterior descending coronary artery  No significant disease of the left circumflex coronary artery proximally however mid to distal portion is occluded  With likely faint vessel to vessel collateral with delayed filling of this vessel  Small ramus intermedius branch has ostial 70% stenosis  Right coronary artery is occluded in the midsegment with vessel to vessel collaterals  There is an occluded saphenous venous graft from prior surgery with multiple stents  that is completely occluded and visualized only as the stent remnants  There is a new saphenous venous graft that is patent however at the anastomotic site there is subtotal occlusion with the right posterior descending coronary artery being only half a millimeter in diameter and not suitable for revascularization  Patent left intramammary artery graft to the left anterior descending coronary artery with retrograde flow into a small to moderate long diagonal branch                            Review of Systems   Constitutional: Negative for activity change, appetite change, fatigue and fever.   HENT: Negative for congestion, ear pain, facial swelling and sore throat.    Eyes: Negative for discharge and visual disturbance.   Respiratory: Positive for shortness of breath. Negative for apnea, chest tightness, wheezing and stridor.    Cardiovascular: Positive for chest pain. Negative for palpitations.   Gastrointestinal: Negative for abdominal distention, abdominal pain, diarrhea, nausea and vomiting.   Genitourinary: Negative for difficulty urinating and dysuria.   Musculoskeletal: Positive for back pain. Negative for arthralgias and myalgias.   Skin: Negative for rash and wound.   Neurological: Negative for dizziness and seizures.   Psychiatric/Behavioral: Negative for agitation and confusion.       Past Medical History:   Diagnosis Date   • Aortocoronary bypass status    • CAD in native artery    • Chest pain    • Coronary angioplasty status    • Hyperlipidemia     unspecified   • Hypertension, benign    • Obesity    • Sleep apnea     unspecified       Allergies   Allergen Reactions   • Demerol [Meperidine] Itching   • Morphine And Related Itching       Past Surgical History:   Procedure Laterality Date   • ANKLE SURGERY      with screws    • CARDIAC CATHETERIZATION Left 03/05/2012 7/2016   • CARDIAC CATHETERIZATION Left 08/12/2019    Procedure: Cardiac Catheterization/Vascular Study BRIANNA OK;  Surgeon: Storm Encinas  MD;  Location:  [facility];  Service: Cardiology   • CARDIAC CATHETERIZATION Left 10/04/2020    Procedure: Cardiac Catheterization/Vascular Study 6F Right groin ;  Surgeon: Storm Encinas MD;  Location:  [facility];  Service: Cardiology;  Laterality: Left;   • CARDIAC CATHETERIZATION N/A 4/11/2022    Procedure: Left Heart Cath;  Surgeon: Storm Encinas MD;  Location:  [facility] CATH INVAS[facility];  Service: Cardiology;  Laterality: N/A;   • COLONOSCOPY  12/30/2008   • COLONOSCOPY  12/30/2013    two polyps removed from the sigmoid colon   • COLONOSCOPY N/A 06/21/2019    Procedure: COLONOSCOPY WITH ANESTHESIA;  Surgeon: Marciano Jaramillo DO;  Location: [facility] ENDOSCOPY;  Service: Gastroenterology   • CORONARY ARTERY BYPASS GRAFT  1993    x2   • CORONARY ARTERY BYPASS GRAFT  10/17/2021   • CORONARY STENT PLACEMENT  2019    X 3   • JOINT REPLACEMENT Left     left ankle   • SHOULDER SURGERY Right     with chano   • WRIST SURGERY Left        Family History   Problem Relation Age of Onset   • Diabetes Mother         type II   • Heart failure Mother         congestive heart failure   • Colon polyps Neg Hx    • Esophageal cancer Neg Hx    • Rectal cancer Neg Hx    • Colon cancer Neg Hx        Social History     Socioeconomic History   • Marital status:    Tobacco Use   • Smoking status: Never Smoker   • Smokeless tobacco: Never Used   Vaping Use   • Vaping Use: Never used   Substance and Sexual Activity   • Alcohol use: Not Currently     Comment: occasional   • Drug use: No   • Sexual activity: Defer           Objective   Physical Exam  Vitals and nursing note reviewed.   Constitutional:       Appearance: He is well-developed.   HENT:      Head: Normocephalic.   Eyes:      Pupils: Pupils are equal, round, and reactive to light.   Cardiovascular:      Rate and Rhythm: Normal rate and regular rhythm.      Heart sounds: No murmur heard.  Pulmonary:      Effort: Pulmonary effort is normal.       Breath sounds: Normal breath sounds.   Abdominal:      General: Bowel sounds are normal.      Palpations: Abdomen is soft.   Musculoskeletal:         General: Normal range of motion.      Cervical back: Normal range of motion and neck supple.      Right lower le+ Edema present.      Left lower le+ Edema present.   Skin:     General: Skin is warm and dry.      Coloration: Skin is pale.   Neurological:      Mental Status: He is alert and oriented to person, place, and time.         Procedures           ED Course  ED Course as of 22 1435   Fri Apr 15, 2022   1006 Chest xray - IMPRESSION:  1. Stable chest exam without acute process. Prior coronary bypass.    [KS]   1210 I discussed testing results with the patient and his wife.  He mentions again he doesn't feel like this is a kidney stone; however, he does have blood in his urine.  I will go ahead and complete a KUB.  He is also requesting additional  pain medication. Call placed to Dr Encinas, cardiology.   [KS]   1301 KUB - IMPRESSION:  1. No radiographic evidence of acute abdominopelvic process.   2. No obvious urolithiasis. Bowel gas pattern is unremarkable. [KS]   1315 KUB - IMPRESSION:  1. No radiographic evidence of acute abdominopelvic process.   2. No obvious urolithiasis. Bowel gas pattern is unremarkable. [KS]   1330 I spoke with Dr Encinas.  [KS]   1430 I spoke with Angelique in the reading room.  Dr Encinas is consulting with Dr Garcia.  I updated the patient and his wife.  He states his pain is still present.  The pain is in the left upper back.  He has been given fentanyl.  He is allergic to morphine and meperidine.  He states he reaction is a rash.  His wife asks if I would give him a dose of benadryl and try a different pain medication which I did do.  I updated them and apologized for the delay [KS]   1730 I re evaluated him.  He did have sp02 in the upper 80s after the dilaudid. He is now more alert.  He states he feels better and would like to  go home.  I gave his wife strict return precautions for which she voiced understanding. They both voiced understanding of both results and instructions.  [KS]      ED Course User Index  [KS] Derrell Reilly, RODGER                                                 [facility]  Number of Diagnoses or Management Options  Acute left-sided thoracic back pain: new and requires workup  Dyspnea, unspecified type: established and worsening  Pedal edema: established and worsening     Amount and/or Complexity of Data Reviewed  Clinical lab tests: ordered and reviewed  Tests in the radiology section of CPT®: ordered and reviewed  Decide to obtain previous medical records or to obtain history from someone other than the patient: yes  Discuss the patient with other providers: yes    Risk of Complications, Morbidity, and/or Mortality  Presenting problems: moderate  Diagnostic procedures: low  Management options: low    Patient Progress  Patient progress: stable      Final diagnoses:   Acute left-sided thoracic back pain   Dyspnea, unspecified type   Pedal edema       ED Disposition  ED Disposition     ED Disposition   Discharge    Condition   Stable    Comment   --             Bridger Rosales MD  [address] [place] [address] [address]  [facility] [id number]  [phone]    Schedule an appointment as soon as possible for a visit in 3 days  Routine ED follow up         Medication List      No changes were made to your prescriptions during this visit.          Derrell Reilly, RODGER  04/22/22 5721

## 2022-04-15 NOTE — DISCHARGE INSTRUCTIONS
Increase fluid intake.  Continue home medication.  Compression stockings when up.  Follow up with cardiology - call Monday.  They will schedule you for your procedure Monday. Follow up with PCP - call Monday for appointment. Return to ED if condition does not improve or worsens

## 2022-04-15 NOTE — ED NOTES
Pt given Ice chips, pt states he feels like he is going to throw up after eating a few ice chips. KODY Dove notified.

## 2022-04-17 DIAGNOSIS — I20.8 EXERTIONAL ANGINA: Primary | ICD-10-CM

## 2022-04-17 RX ORDER — SODIUM CHLORIDE 9 MG/ML
75 INJECTION, SOLUTION INTRAVENOUS CONTINUOUS
Status: CANCELLED | OUTPATIENT
Start: 2022-04-17

## 2022-04-18 LAB
QT INTERVAL: 426 MS
QTC INTERVAL: 439 MS

## 2022-04-20 ENCOUNTER — APPOINTMENT (OUTPATIENT)
Dept: CARDIAC REHAB | Facility: HOSPITAL | Age: 67
End: 2022-04-20

## 2022-04-22 ENCOUNTER — APPOINTMENT (OUTPATIENT)
Dept: CARDIAC REHAB | Facility: HOSPITAL | Age: 67
End: 2022-04-22

## 2022-04-22 ENCOUNTER — TELEPHONE (OUTPATIENT)
Dept: CARDIAC REHAB | Facility: HOSPITAL | Age: 67
End: 2022-04-22

## 2022-04-22 NOTE — TELEPHONE ENCOUNTER
Mr. Velasquez was notified by cardiology office that he is scheduled for heart cath on 4/27/22. Pt is excused from Cardiac rehab until cath results.

## 2022-04-27 ENCOUNTER — HOSPITAL ENCOUNTER (OUTPATIENT)
Facility: HOSPITAL | Age: 67
Discharge: HOME | End: 2022-04-28
Attending: INTERNAL MEDICINE | Admitting: INTERNAL MEDICINE

## 2022-04-27 ENCOUNTER — APPOINTMENT (OUTPATIENT)
Dept: CARDIAC REHAB | Facility: HOSPITAL | Age: 67
End: 2022-04-27

## 2022-04-27 DIAGNOSIS — I20.8 EXERTIONAL ANGINA: ICD-10-CM

## 2022-04-27 LAB
ALBUMIN SERPL-MCNC: 4.2 G/DL (ref 3.5–5.2)
ALBUMIN/GLOB SERPL: 1.8 G/DL
ALP SERPL-CCNC: 74 U/L (ref 39–117)
ALT SERPL W P-5'-P-CCNC: 17 U/L (ref 1–41)
ANION GAP SERPL CALCULATED.3IONS-SCNC: 11 MMOL/L (ref 5–15)
AST SERPL-CCNC: 17 U/L (ref 1–40)
BASOPHILS # BLD AUTO: 0.05 10*3/MM3 (ref 0–0.2)
BASOPHILS NFR BLD AUTO: 1 % (ref 0–1.5)
BILIRUB SERPL-MCNC: 0.4 MG/DL (ref 0–1.2)
BUN SERPL-MCNC: 13 MG/DL (ref 8–23)
BUN/CREAT SERPL: 12.6 (ref 7–25)
CALCIUM SPEC-SCNC: 9.4 MG/DL (ref 8.6–10.5)
CHLORIDE SERPL-SCNC: 109 MMOL/L (ref 98–107)
CO2 SERPL-SCNC: 23 MMOL/L (ref 22–29)
CREAT SERPL-MCNC: 1.03 MG/DL (ref 0.76–1.27)
DEPRECATED RDW RBC AUTO: 42.8 FL (ref 37–54)
EGFRCR SERPLBLD CKD-EPI 2021: 80.6 ML/MIN/1.73
EOSINOPHIL # BLD AUTO: 0.14 10*3/MM3 (ref 0–0.4)
EOSINOPHIL NFR BLD AUTO: 2.9 % (ref 0.3–6.2)
ERYTHROCYTE [DISTWIDTH] IN BLOOD BY AUTOMATED COUNT: 12.6 % (ref 12.3–15.4)
GLOBULIN UR ELPH-MCNC: 2.3 GM/DL
GLUCOSE SERPL-MCNC: 157 MG/DL (ref 65–99)
HCT VFR BLD AUTO: 35.9 % (ref 37.5–51)
HGB BLD-MCNC: 12.1 G/DL (ref 13–17.7)
IMM GRANULOCYTES # BLD AUTO: 0.01 10*3/MM3 (ref 0–0.05)
IMM GRANULOCYTES NFR BLD AUTO: 0.2 % (ref 0–0.5)
INR PPP: 1.02 (ref 0.91–1.09)
LYMPHOCYTES # BLD AUTO: 1.61 10*3/MM3 (ref 0.7–3.1)
LYMPHOCYTES NFR BLD AUTO: 33.2 % (ref 19.6–45.3)
MCH RBC QN AUTO: 31.7 PG (ref 26.6–33)
MCHC RBC AUTO-ENTMCNC: 33.7 G/DL (ref 31.5–35.7)
MCV RBC AUTO: 94 FL (ref 79–97)
MONOCYTES # BLD AUTO: 0.56 10*3/MM3 (ref 0.1–0.9)
MONOCYTES NFR BLD AUTO: 11.5 % (ref 5–12)
NEUTROPHILS NFR BLD AUTO: 2.48 10*3/MM3 (ref 1.7–7)
NEUTROPHILS NFR BLD AUTO: 51.2 % (ref 42.7–76)
NRBC BLD AUTO-RTO: 0 /100 WBC (ref 0–0.2)
PLATELET # BLD AUTO: 190 10*3/MM3 (ref 140–450)
PMV BLD AUTO: 10.5 FL (ref 6–12)
POTASSIUM SERPL-SCNC: 4 MMOL/L (ref 3.5–5.2)
PROT SERPL-MCNC: 6.5 G/DL (ref 6–8.5)
PROTHROMBIN TIME: 13 SECONDS (ref 11.9–14.6)
RBC # BLD AUTO: 3.82 10*6/MM3 (ref 4.14–5.8)
SODIUM SERPL-SCNC: 143 MMOL/L (ref 136–145)
WBC NRBC COR # BLD: 4.85 10*3/MM3 (ref 3.4–10.8)

## 2022-04-27 PROCEDURE — G0378 HOSPITAL OBSERVATION PER HR: HCPCS

## 2022-04-27 PROCEDURE — C1725 CATH, TRANSLUMIN NON-LASER: HCPCS | Performed by: INTERNAL MEDICINE

## 2022-04-27 PROCEDURE — C1769 GUIDE WIRE: HCPCS | Performed by: INTERNAL MEDICINE

## 2022-04-27 PROCEDURE — C1874 STENT, COATED/COV W/DEL SYS: HCPCS | Performed by: INTERNAL MEDICINE

## 2022-04-27 PROCEDURE — 25010000002 BIVALIRUDIN TRIFLUOROACETATE 250 MG RECONSTITUTED SOLUTION: Performed by: INTERNAL MEDICINE

## 2022-04-27 PROCEDURE — C1887 CATHETER, GUIDING: HCPCS | Performed by: INTERNAL MEDICINE

## 2022-04-27 PROCEDURE — 0 IOPAMIDOL PER 1 ML: Performed by: INTERNAL MEDICINE

## 2022-04-27 PROCEDURE — 93455 CORONARY ART/GRFT ANGIO S&I: CPT | Performed by: INTERNAL MEDICINE

## 2022-04-27 PROCEDURE — 85610 PROTHROMBIN TIME: CPT | Performed by: INTERNAL MEDICINE

## 2022-04-27 PROCEDURE — 85025 COMPLETE CBC W/AUTO DIFF WBC: CPT | Performed by: INTERNAL MEDICINE

## 2022-04-27 PROCEDURE — C1894 INTRO/SHEATH, NON-LASER: HCPCS | Performed by: INTERNAL MEDICINE

## 2022-04-27 PROCEDURE — 92937 PRQ TRLUML REVSC CAB GRF 1: CPT | Performed by: INTERNAL MEDICINE

## 2022-04-27 PROCEDURE — C1760 CLOSURE DEV, VASC: HCPCS | Performed by: INTERNAL MEDICINE

## 2022-04-27 PROCEDURE — 25010000002 MIDAZOLAM PER 1 MG: Performed by: INTERNAL MEDICINE

## 2022-04-27 PROCEDURE — 25010000002 DIPHENHYDRAMINE PER 50 MG: Performed by: INTERNAL MEDICINE

## 2022-04-27 PROCEDURE — C9607 PERC D-E COR REVASC CHRO SIN: HCPCS | Performed by: INTERNAL MEDICINE

## 2022-04-27 PROCEDURE — 99152 MOD SED SAME PHYS/QHP 5/>YRS: CPT | Performed by: INTERNAL MEDICINE

## 2022-04-27 PROCEDURE — 80053 COMPREHEN METABOLIC PANEL: CPT | Performed by: INTERNAL MEDICINE

## 2022-04-27 PROCEDURE — C9604 PERC D-E COR REVASC T CABG S: HCPCS | Performed by: INTERNAL MEDICINE

## 2022-04-27 PROCEDURE — 93005 ELECTROCARDIOGRAM TRACING: CPT | Performed by: INTERNAL MEDICINE

## 2022-04-27 PROCEDURE — 92943 PRQ TRLUML REVSC CH OCC ANT: CPT | Performed by: INTERNAL MEDICINE

## 2022-04-27 PROCEDURE — 25010000002 FENTANYL CITRATE (PF) 100 MCG/2ML SOLUTION: Performed by: INTERNAL MEDICINE

## 2022-04-27 DEVICE — XIENCE SKYPOINT™ EVEROLIMUS ELUTING CORONARY STENT SYSTEM 2.50 MM X 18 MM / RAPID-EXCHANGE
Type: IMPLANTABLE DEVICE | Status: FUNCTIONAL
Brand: XIENCE SKYPOINT™

## 2022-04-27 DEVICE — STNT CORNRY RESOLUTE ONYX RX 2X8MM: Type: IMPLANTABLE DEVICE | Status: FUNCTIONAL

## 2022-04-27 DEVICE — EVEROLIMUS-ELUTING PLATINUM CHROMIUM CORONARY STENT SYSTEM
Type: IMPLANTABLE DEVICE | Status: FUNCTIONAL
Brand: SYNERGY™

## 2022-04-27 RX ORDER — CARVEDILOL 6.25 MG/1
12.5 TABLET ORAL 2 TIMES DAILY
Status: DISCONTINUED | OUTPATIENT
Start: 2022-04-27 | End: 2022-04-28 | Stop reason: HOSPADM

## 2022-04-27 RX ORDER — RAMIPRIL 1.25 MG/1
2.5 CAPSULE ORAL NIGHTLY
Status: DISCONTINUED | OUTPATIENT
Start: 2022-04-27 | End: 2022-04-28 | Stop reason: HOSPADM

## 2022-04-27 RX ORDER — FENTANYL CITRATE 50 UG/ML
INJECTION, SOLUTION INTRAMUSCULAR; INTRAVENOUS AS NEEDED
Status: DISCONTINUED | OUTPATIENT
Start: 2022-04-27 | End: 2022-04-27 | Stop reason: HOSPADM

## 2022-04-27 RX ORDER — NITROGLYCERIN 20 MG/100ML
INJECTION INTRAVENOUS
Status: COMPLETED
Start: 2022-04-27 | End: 2022-04-27

## 2022-04-27 RX ORDER — MIDAZOLAM HYDROCHLORIDE 1 MG/ML
INJECTION INTRAMUSCULAR; INTRAVENOUS AS NEEDED
Status: DISCONTINUED | OUTPATIENT
Start: 2022-04-27 | End: 2022-04-27 | Stop reason: HOSPADM

## 2022-04-27 RX ORDER — CLOPIDOGREL BISULFATE 75 MG/1
75 TABLET ORAL DAILY
Status: DISCONTINUED | OUTPATIENT
Start: 2022-04-28 | End: 2022-04-28 | Stop reason: HOSPADM

## 2022-04-27 RX ORDER — NITROGLYCERIN 20 MG/100ML
10-50 INJECTION INTRAVENOUS
Status: DISCONTINUED | OUTPATIENT
Start: 2022-04-27 | End: 2022-04-28 | Stop reason: HOSPADM

## 2022-04-27 RX ORDER — AMLODIPINE BESYLATE 5 MG/1
5 TABLET ORAL DAILY
Status: DISCONTINUED | OUTPATIENT
Start: 2022-04-28 | End: 2022-04-28 | Stop reason: HOSPADM

## 2022-04-27 RX ORDER — SODIUM CHLORIDE 9 MG/ML
125 INJECTION, SOLUTION INTRAVENOUS CONTINUOUS
Status: DISPENSED | OUTPATIENT
Start: 2022-04-27 | End: 2022-04-27

## 2022-04-27 RX ORDER — DIPHENHYDRAMINE HYDROCHLORIDE 50 MG/ML
INJECTION INTRAMUSCULAR; INTRAVENOUS AS NEEDED
Status: DISCONTINUED | OUTPATIENT
Start: 2022-04-27 | End: 2022-04-27 | Stop reason: HOSPADM

## 2022-04-27 RX ORDER — NITROGLYCERIN 0.4 MG/1
0.4 TABLET SUBLINGUAL
COMMUNITY
End: 2022-08-16 | Stop reason: SDUPTHER

## 2022-04-27 RX ORDER — SODIUM CHLORIDE 9 MG/ML
75 INJECTION, SOLUTION INTRAVENOUS CONTINUOUS
Status: DISCONTINUED | OUTPATIENT
Start: 2022-04-27 | End: 2022-04-28 | Stop reason: HOSPADM

## 2022-04-27 RX ORDER — ACETAMINOPHEN 325 MG/1
650 TABLET ORAL EVERY 4 HOURS PRN
Status: DISCONTINUED | OUTPATIENT
Start: 2022-04-27 | End: 2022-04-28 | Stop reason: HOSPADM

## 2022-04-27 RX ORDER — ALUMINA, MAGNESIA, AND SIMETHICONE 2400; 2400; 240 MG/30ML; MG/30ML; MG/30ML
15 SUSPENSION ORAL EVERY 6 HOURS PRN
Status: DISCONTINUED | OUTPATIENT
Start: 2022-04-27 | End: 2022-04-28 | Stop reason: HOSPADM

## 2022-04-27 RX ORDER — MULTIPLE VITAMINS W/ MINERALS TAB 9MG-400MCG
1 TAB ORAL NIGHTLY
Status: DISCONTINUED | OUTPATIENT
Start: 2022-04-27 | End: 2022-04-28 | Stop reason: HOSPADM

## 2022-04-27 RX ORDER — ASPIRIN 81 MG/1
81 TABLET ORAL DAILY
Status: DISCONTINUED | OUTPATIENT
Start: 2022-04-27 | End: 2022-04-28 | Stop reason: HOSPADM

## 2022-04-27 RX ORDER — ATORVASTATIN CALCIUM 40 MG/1
40 TABLET, FILM COATED ORAL DAILY
Status: DISCONTINUED | OUTPATIENT
Start: 2022-04-27 | End: 2022-04-28 | Stop reason: HOSPADM

## 2022-04-27 RX ORDER — CLOPIDOGREL BISULFATE 75 MG/1
75 TABLET ORAL DAILY
Status: DISCONTINUED | OUTPATIENT
Start: 2022-04-27 | End: 2022-04-27

## 2022-04-27 RX ORDER — CLOPIDOGREL BISULFATE 75 MG/1
TABLET ORAL AS NEEDED
Status: DISCONTINUED | OUTPATIENT
Start: 2022-04-27 | End: 2022-04-27 | Stop reason: HOSPADM

## 2022-04-27 RX ORDER — NITROGLYCERIN 0.4 MG/1
0.4 TABLET SUBLINGUAL
Status: DISCONTINUED | OUTPATIENT
Start: 2022-04-27 | End: 2022-04-28 | Stop reason: HOSPADM

## 2022-04-27 RX ADMIN — NITROGLYCERIN 5 MCG: 20 INJECTION INTRAVENOUS at 14:07

## 2022-04-27 RX ADMIN — CARVEDILOL 12.5 MG: 6.25 TABLET, FILM COATED ORAL at 21:08

## 2022-04-27 RX ADMIN — Medication 1 TABLET: at 21:08

## 2022-04-27 RX ADMIN — ATORVASTATIN CALCIUM 40 MG: 40 TABLET, FILM COATED ORAL at 21:08

## 2022-04-27 RX ADMIN — SODIUM CHLORIDE 125 ML/HR: 9 INJECTION, SOLUTION INTRAVENOUS at 15:21

## 2022-04-27 RX ADMIN — ALUMINUM HYDROXIDE, MAGNESIUM HYDROXIDE, AND DIMETHICONE 15 ML: 400; 400; 40 SUSPENSION ORAL at 22:29

## 2022-04-27 RX ADMIN — RAMIPRIL 2.5 MG: 1.25 CAPSULE ORAL at 21:08

## 2022-04-28 VITALS
OXYGEN SATURATION: 95 % | BODY MASS INDEX: 35.16 KG/M2 | DIASTOLIC BLOOD PRESSURE: 76 MMHG | HEIGHT: 72 IN | WEIGHT: 259.6 LBS | TEMPERATURE: 98 F | SYSTOLIC BLOOD PRESSURE: 136 MMHG | HEART RATE: 62 BPM | RESPIRATION RATE: 16 BRPM

## 2022-04-28 DIAGNOSIS — Z95.5 S/P DRUG ELUTING CORONARY STENT PLACEMENT: Primary | ICD-10-CM

## 2022-04-28 DIAGNOSIS — I20.8 EXERTIONAL ANGINA: ICD-10-CM

## 2022-04-28 LAB
ANION GAP SERPL CALCULATED.3IONS-SCNC: 12 MMOL/L (ref 5–15)
BUN SERPL-MCNC: 11 MG/DL (ref 8–23)
BUN/CREAT SERPL: 11.8 (ref 7–25)
CALCIUM SPEC-SCNC: 9.2 MG/DL (ref 8.6–10.5)
CHLORIDE SERPL-SCNC: 107 MMOL/L (ref 98–107)
CO2 SERPL-SCNC: 23 MMOL/L (ref 22–29)
CREAT SERPL-MCNC: 0.93 MG/DL (ref 0.76–1.27)
DEPRECATED RDW RBC AUTO: 43.4 FL (ref 37–54)
EGFRCR SERPLBLD CKD-EPI 2021: 91.1 ML/MIN/1.73
ERYTHROCYTE [DISTWIDTH] IN BLOOD BY AUTOMATED COUNT: 12.7 % (ref 12.3–15.4)
GLUCOSE SERPL-MCNC: 122 MG/DL (ref 65–99)
HCT VFR BLD AUTO: 35.1 % (ref 37.5–51)
HCT VFR BLD AUTO: 35.6 % (ref 37.5–51)
HGB BLD-MCNC: 11.9 G/DL (ref 13–17.7)
MCH RBC QN AUTO: 31.5 PG (ref 26.6–33)
MCHC RBC AUTO-ENTMCNC: 33.9 G/DL (ref 31.5–35.7)
MCV RBC AUTO: 92.9 FL (ref 79–97)
PA ADP PRP-ACNC: 178 PRU (ref 194–418)
PLATELET # BLD AUTO: 175 10*3/MM3 (ref 140–450)
PLATELET # BLD AUTO: 186 10*3/MM3 (ref 140–450)
PMV BLD AUTO: 10.6 FL (ref 6–12)
POTASSIUM SERPL-SCNC: 3.7 MMOL/L (ref 3.5–5.2)
QT INTERVAL: 436 MS
QT INTERVAL: 472 MS
QTC INTERVAL: 449 MS
QTC INTERVAL: 475 MS
RBC # BLD AUTO: 3.78 10*6/MM3 (ref 4.14–5.8)
SODIUM SERPL-SCNC: 142 MMOL/L (ref 136–145)
WBC NRBC COR # BLD: 7.07 10*3/MM3 (ref 3.4–10.8)

## 2022-04-28 PROCEDURE — 85576 BLOOD PLATELET AGGREGATION: CPT | Performed by: INTERNAL MEDICINE

## 2022-04-28 PROCEDURE — 80048 BASIC METABOLIC PNL TOTAL CA: CPT | Performed by: INTERNAL MEDICINE

## 2022-04-28 PROCEDURE — 93005 ELECTROCARDIOGRAM TRACING: CPT | Performed by: INTERNAL MEDICINE

## 2022-04-28 PROCEDURE — 99217 PR OBSERVATION CARE DISCHARGE MANAGEMENT: CPT | Performed by: NURSE PRACTITIONER

## 2022-04-28 PROCEDURE — 85027 COMPLETE CBC AUTOMATED: CPT | Performed by: INTERNAL MEDICINE

## 2022-04-28 PROCEDURE — 93010 ELECTROCARDIOGRAM REPORT: CPT | Performed by: INTERNAL MEDICINE

## 2022-04-28 RX ORDER — ATORVASTATIN CALCIUM 80 MG/1
80 TABLET, FILM COATED ORAL DAILY
Qty: 30 TABLET | Refills: 11 | Status: SHIPPED | OUTPATIENT
Start: 2022-04-28 | End: 2023-01-13 | Stop reason: SDUPTHER

## 2022-04-28 RX ORDER — NITROGLYCERIN 0.4 MG/1
0.4 TABLET SUBLINGUAL
Start: 2022-04-28

## 2022-04-28 RX ORDER — ISOSORBIDE MONONITRATE 120 MG/1
120 TABLET, EXTENDED RELEASE ORAL DAILY
Qty: 180 TABLET | Refills: 3
Start: 2022-04-28

## 2022-04-28 RX ADMIN — ASPIRIN 81 MG: 81 TABLET, COATED ORAL at 08:44

## 2022-04-28 RX ADMIN — CLOPIDOGREL 75 MG: 75 TABLET, FILM COATED ORAL at 08:44

## 2022-04-28 RX ADMIN — CARVEDILOL 12.5 MG: 6.25 TABLET, FILM COATED ORAL at 08:44

## 2022-04-28 RX ADMIN — AMLODIPINE BESYLATE 5 MG: 5 TABLET ORAL at 08:44

## 2022-04-29 ENCOUNTER — APPOINTMENT (OUTPATIENT)
Dept: CARDIAC REHAB | Facility: HOSPITAL | Age: 67
End: 2022-04-29

## 2022-05-04 ENCOUNTER — HOSPITAL ENCOUNTER (OUTPATIENT)
Dept: CT IMAGING | Age: 67
Discharge: HOME | End: 2022-05-04
Payer: MEDICARE

## 2022-05-04 ENCOUNTER — APPOINTMENT (OUTPATIENT)
Dept: CARDIAC REHAB | Facility: HOSPITAL | Age: 67
End: 2022-05-04

## 2022-05-04 DIAGNOSIS — R10.84 GENERALIZED ABDOMINAL PAIN: ICD-10-CM

## 2022-05-04 PROCEDURE — 74170 CT ABD WO CNTRST FLWD CNTRST: CPT

## 2022-05-04 PROCEDURE — 6360000004 HC RX CONTRAST MEDICATION: Performed by: FAMILY MEDICINE

## 2022-05-04 RX ADMIN — IOPAMIDOL 75 ML: 755 INJECTION, SOLUTION INTRAVENOUS at 12:16

## 2022-05-06 ENCOUNTER — APPOINTMENT (OUTPATIENT)
Dept: CARDIAC REHAB | Facility: HOSPITAL | Age: 67
End: 2022-05-06

## 2022-05-09 ENCOUNTER — TREATMENT (OUTPATIENT)
Dept: CARDIAC REHAB | Facility: HOSPITAL | Age: 67
End: 2022-05-09

## 2022-05-09 DIAGNOSIS — Z95.1 S/P CABG X 2: Primary | ICD-10-CM

## 2022-05-09 PROCEDURE — 93798 PHYS/QHP OP CAR RHAB W/ECG: CPT

## 2022-05-11 ENCOUNTER — APPOINTMENT (OUTPATIENT)
Dept: CARDIAC REHAB | Facility: HOSPITAL | Age: 67
End: 2022-05-11

## 2022-05-13 ENCOUNTER — OFFICE VISIT (OUTPATIENT)
Dept: CARDIOLOGY | Facility: CLINIC | Age: 67
End: 2022-05-13

## 2022-05-13 VITALS
HEART RATE: 59 BPM | OXYGEN SATURATION: 98 % | SYSTOLIC BLOOD PRESSURE: 140 MMHG | WEIGHT: 260 LBS | HEIGHT: 72 IN | BODY MASS INDEX: 35.21 KG/M2 | DIASTOLIC BLOOD PRESSURE: 80 MMHG

## 2022-05-13 DIAGNOSIS — I25.118 CORONARY ARTERY DISEASE WITH EXERTIONAL ANGINA: ICD-10-CM

## 2022-05-13 DIAGNOSIS — I10 ESSENTIAL HYPERTENSION: Chronic | ICD-10-CM

## 2022-05-13 DIAGNOSIS — G47.33 OBSTRUCTIVE SLEEP APNEA ON CPAP: Chronic | ICD-10-CM

## 2022-05-13 DIAGNOSIS — I25.708 CORONARY ARTERY DISEASE OF BYPASS GRAFT OF NATIVE HEART WITH STABLE ANGINA PECTORIS: ICD-10-CM

## 2022-05-13 DIAGNOSIS — Z95.5 STENTED CORONARY ARTERY: ICD-10-CM

## 2022-05-13 DIAGNOSIS — Z99.89 OBSTRUCTIVE SLEEP APNEA ON CPAP: Chronic | ICD-10-CM

## 2022-05-13 DIAGNOSIS — E78.2 MIXED HYPERLIPIDEMIA: Chronic | ICD-10-CM

## 2022-05-13 DIAGNOSIS — E66.01 SEVERE OBESITY (BMI 35.0-39.9) WITH COMORBIDITY: ICD-10-CM

## 2022-05-13 DIAGNOSIS — Z95.1 S/P CABG X 2: Primary | ICD-10-CM

## 2022-05-13 PROCEDURE — 99214 OFFICE O/P EST MOD 30 MIN: CPT | Performed by: INTERNAL MEDICINE

## 2022-05-13 NOTE — PROGRESS NOTES
Lio Velasquez  [id number]  1955  66 y.o.  male    Referring Provider: Bridger Rosales MD    Reason for Follow-up Visit: Here for routine follow up   Prior to last visit seen after dobutamine stress echo  CAD S/P CABG X2 S/P stenting of SVG x2 last 7/16  S/P 2017  left ankle replacement Dr Alanna Dias [facility]  Now with recurrent chest pain, classic angina pectoris  coronary artery disease stented coronary artery   Had  Left circumflex artery percutaneous coronary intervention  With 2 BRIANNA and 2 drug eluting stent to SVG to right posterior descending coronary artery     Subjective    Mild chronic exertional shortness of breath on exertion relieved with rest  No significant cough or wheezing    No palpitations  No significant pedal edema    No fever or chills  No significant expectoration    No hemoptysis  No presyncope or syncope    Tolerating current medications well with no untoward side effects   Compliant with prescribed medication regimen. Tries to adhere to cardiac diet.     Dull exertional chest pain   Overall much better and restarted cardiac rehab     No new events or complaints since last visit otherwise   No bleeding, excessive bruising, gait instability or fall risks      BP well controlled at home.         History of present illness:  Lio Velasquez is a 66 y.o. yo male with  coronary artery disease Coronary artery bypass grafting stented coronary artery  who presents today for   Chief Complaint   Patient presents with   • Coronary Artery Disease     4wk- still having some SOB. No other new issues at this time.    .    History  Past Medical History:   Diagnosis Date   • Aortocoronary bypass status    • CAD in native artery    • Chest pain    • Coronary angioplasty status    • Hyperlipidemia     unspecified   • Hypertension, benign    • Obesity    • Sleep apnea     unspecified   ,   Past Surgical History:   Procedure Laterality Date   • ANKLE SURGERY      with screws    •  CARDIAC CATHETERIZATION Left 03/05/2012 7/2016   • CARDIAC CATHETERIZATION Left 08/12/2019    Procedure: Cardiac Catheterization/Vascular Study BRIANNA OK;  Surgeon: Storm Encinas MD;  Location:  [facility] CATH INVASIVE LOCATION;  Service: Cardiology   • CARDIAC CATHETERIZATION Left 10/04/2020    Procedure: Cardiac Catheterization/Vascular Study 6F Right groin ;  Surgeon: Storm Encinas MD;  Location: [facility] CATH INVASIVE LOCATION;  Service: Cardiology;  Laterality: Left;   • CARDIAC CATHETERIZATION N/A 04/11/2022    Procedure: Left Heart Cath;  Surgeon: Storm Encinas MD;  Location:  [facility] CATH INVASIVE LOCATION;  Service: Cardiology;  Laterality: N/A;   • CARDIAC CATHETERIZATION N/A 04/27/2022    Procedure: Cardiac Catheterization/Vascular Study  of LCx and possible PCI SVG to RPDA;  Surgeon: Cruz Garcia MD;  Location: [facility] CATH INVASIVE LOCATION;  Service: Cardiology;  Laterality: N/A;   • COLONOSCOPY  12/30/2008   • COLONOSCOPY  12/30/2013    two polyps removed from the sigmoid colon   • COLONOSCOPY N/A 06/21/2019    Procedure: COLONOSCOPY WITH ANESTHESIA;  Surgeon: Marciano Jaramillo DO;  Location: [facility] ENDOSCOPY;  Service: Gastroenterology   • CORONARY ARTERY BYPASS GRAFT  1993    x2   • CORONARY ARTERY BYPASS GRAFT  10/17/2021   • CORONARY STENT PLACEMENT  2019    x5   • JOINT REPLACEMENT Left     left ankle   • SHOULDER SURGERY Right     with chano   • WRIST SURGERY Left    ,   Family History   Problem Relation Age of Onset   • Diabetes Mother         type II   • Heart failure Mother         congestive heart failure   • Colon polyps Neg Hx    • Esophageal cancer Neg Hx    • Rectal cancer Neg Hx    • Colon cancer Neg Hx    ,   Social History     Tobacco Use   • Smoking status: Never Smoker   • Smokeless tobacco: Never Used   Vaping Use   • Vaping Use: Never used   Substance Use Topics   • Alcohol use: Not Currently     Comment: occasional   • Drug use: No   ,     Medications  Current Outpatient Medications  "  Medication Sig Dispense Refill   • aspirin 81 MG tablet Take 81 mg by mouth every night.     • atorvastatin (LIPITOR) 80 MG tablet Take 1 tablet by mouth Daily. 30 tablet 11   • carvedilol (COREG) 12.5 MG tablet Take 1 tablet by mouth 2 (Two) Times a Day. 180 tablet 3   • clopidogrel (PLAVIX) 75 MG tablet Take 1 tablet by mouth Daily. 90 tablet 3   • isosorbide mononitrate (IMDUR) 120 MG 24 hr tablet Take 1 tablet by mouth Daily. 180 tablet 3   • Multiple Vitamins-Minerals (CENTRUM CARDIO PO) Take 1 tablet by mouth Every Night.     • nitroglycerin (NITROSTAT) 0.4 MG SL tablet Place 0.4 mg under the tongue Every 5 (Five) Minutes As Needed for Chest Pain. Take no more than 3 doses in 15 minutes.     • nitroglycerin (NITROSTAT) 0.4 MG SL tablet Place 1 tablet under the tongue Every 5 (Five) Minutes As Needed for Chest Pain. 1 under the tongue as needed for angina, may repeat q5mins for up three doses     • ramipril (ALTACE) 2.5 MG capsule Take 1 capsule by mouth Every Night. 90 capsule 3     No current facility-administered medications for this visit.       Allergies:  Demerol [meperidine] and Morphine and related    Review of Systems  Review of Systems   Constitutional: Positive for malaise/fatigue.   HENT: Negative.    Eyes: Negative.    Cardiovascular: Positive for chest pain and dyspnea on exertion. Negative for claudication, cyanosis, irregular heartbeat, leg swelling, near-syncope, orthopnea, palpitations, paroxysmal nocturnal dyspnea and syncope.   Respiratory: Negative.    Endocrine: Negative.    Hematologic/Lymphatic: Negative.    Skin: Negative.    Musculoskeletal: Positive for arthritis, back pain, joint pain and stiffness.   Gastrointestinal: Negative for anorexia.   Genitourinary: Negative.    Neurological: Positive for weakness.   Psychiatric/Behavioral: Negative.        Objective     Physical Exam:  /80   Pulse 59   Ht 182.9 cm (72\")   Wt 118 kg (260 lb)   SpO2 98%   BMI 35.26 kg/m² "   Physical Exam   Constitutional: He appears well-developed.   HENT:   Head: Normocephalic.   Neck: Normal carotid pulses and no JVD present. No tracheal tenderness present. Carotid bruit is not present. No tracheal deviation present.   Cardiovascular: Regular rhythm, normal heart sounds and normal pulses.   Pulmonary/Chest: Effort normal. No stridor.   Abdominal: Soft. He exhibits no distension. There is no abdominal tenderness.     Vascular Status -  His right foot exhibits abnormal foot edema. His left foot exhibits abnormal foot edema.  Neurological: He is alert. No cranial nerve deficit or sensory deficit.   Skin: Skin is warm.   Psychiatric: His speech is normal and behavior is normal.        Results Review:        Selective Coronary Angiography:  #1.  The left main coronary artery is an average size vessel with no significant disease  #2.  The left anterior descending coronary artery is totally occluded in the mid portion  #3.  The left circumflex coronary artery is an average size vessel with two OM branches. The LCX is totally occluded just distal to the 2nd OM  #4  The right coronary artery is totally occluded in the proximal portion.  #5. The SVG to the RCA is patent but there is a 99% stenosis at the insertion site.      of the LCX  Guide 6Fr XB3.5, guide wire 0.014 PT graphix  Comment: A 0.014 Fighter wire was successfully advanced through the total occlusion using a mamba support catheter and into the true distal lumen. The lesion was pre dilated with a 1.5 mm  balloon, followed by a 2.5 mm balloon at 6 margoth for 30 sec. This was followed by a 2.5 x 18 mm Xience BRIANNA at 15 MARGOTH for 30 sec just distal to the 2nd OM. This was followed by a 2.5 x 16 mm Synergy BRIANNA just proximal to this stent resulting in no residual stenosis within the stented segment and ROSS 3 flow distally. The 2nd OM had ROSS 1 flow as well as the distal 3rd OM     Stent placement to the RCA via the SVG  Guide 6 Fr RCB guide, guide  wire 0.014 PT graphix x2  Comment: The wire was advanced through the stenosis down into the distal PDA. A second graphix was the advanced retrograde up the PDA. The vessel was dilated with a 2.0 balloon over each wire to 6 margoth each. A 2.0 x 8 mm Medtronic BRIANNA was then deployed at 14 margoth for 30 sec into the distal PDA and a similar stent was then deployed in the retrograde PDA in similar fashion. This resulted in no residual stenosis and ROSS 3 flow distally     Conscious Sedation  I supervised the administration of conscious sedation by the nursing staff. The first dose was given at 1121 and the end of the face to face encounter was at 1325. During this time, BP, HR, oximetry, and neurologic status were closely monitored.     Estimated Blood Loss: none  Specimens: None  Complications: None        Conclusion:  #1 status post successful  procedure to the LCX using drug-eluting stents  #2 s/p successful BRIANNA placement x2 to the PDA via the RCA SVG     Final Comment:  Pt tolerated the procedure well. There were no obvious complications. Hemostasis was achieved with a 6F perclose hemostatic device.           Conclusion of cardiac catheterization    4/11/2022        Coronary angiography:     Slight eccentric takeoff of the left main coronary artery  No significant dampening of pressure waveforms  Good reflux of contrast denoting no significant stenosis of ostial left main coronary artery  Ostial left main coronary artery has approximately a 20% stenosis  Occluded proximal left anterior descending coronary artery  No significant disease of the left circumflex coronary artery proximally however mid to distal portion is occluded  With likely faint vessel to vessel collateral with delayed filling of this vessel  Small ramus intermedius branch has ostial 70% stenosis  Right coronary artery is occluded in the midsegment with vessel to vessel collaterals  There is an occluded saphenous venous graft from prior surgery with  multiple stents that is completely occluded and visualized only as the stent remnants  There is a new saphenous venous graft that is patent however at the anastomotic site there is subtotal occlusion with the right posterior descending coronary artery being only half a millimeter in diameter and not suitable for revascularization  Patent left intramammary artery graft to the left anterior descending coronary artery with retrograde flow into a small to moderate long diagonal branch     Left heart cath: LVEDP 28 mm Hg with no gradient across aortic valve on pullback.      LV Gram:Left ventricular ejection fraction of approximately 40-45% with akinesis of the mid and basal inferior wall and hypokinesis of the distal inferior and inferior wall           ____________________________________________________________________________________________________________________________________________     Plan after cardiac catheterization     Considered Lexiscan Cardiolite stress test  Possible additional revascularization including consideration for chronic total occlusion of left circumflex coronary artery versus medical therapy and consideration for EECP  Usual post procedure precautions after arteriotomy including monitoring for signs both local and systemic side effects.  On ultimate discharge will receive printed instructions  Intensive risk factor modifications for both primary and secondary prevention if applicable  Hydration   Observation      Results for orders placed during the hospital encounter of 10/03/20    Adult Transthoracic Echo Complete W/ Cont if Necessary Per Protocol    Interpretation Summary  · Left ventricular ejection fraction appears to be 56 - 60%. Left ventricular systolic function is normal.  · Normal diastolic function  · No pulmonary hypertension   cath 8/12/19     Conclusion of cardiac catheterization     Severe sequential stenosis of mid and distal saphenous venous graft to right coronary artery  treated primary stenting using 3.5 x 38 mm Jamee drug-eluting stent  PTCA of the distal right coronary artery followed by implantation of 3.5 x 23 mm Jamee drug-eluting stent.  The very distal portion of the saphenous venous graft to the right coronary artery has a 50% stenosis.  ROSS-3 flow before and after procedure.  No symptoms.  Filter wire was used.        ____________________________________________________________________________________________________________________________________________     Plan after cardiac catheterization        Discontinue Plavix  Start Brilinta 180 mg now followed by 90 mg p.o. twice daily  Stop Zocor and start Crestor 20 mg daily  Keep LDL well below 70 mg/dL  Given multiple stent placement to saphenous venous graft to the right coronary artery much higher incidence of acute stent thrombosis.  If he has any chest pain to come to the emergency room immediately.     Intensive risk factor modifications for both primary and secondary prevention if applicable  Hydration   Observation       ____________________________________________________________________________________________________________________________________________  Health maintenance and recommendations      Similar recommendations as last visit   Offered to give patient  a copy      Questions were encouraged, asked and answered to the patient's  understanding and satisfaction. Questions if any regarding current medications and side effects, need for refills and importance of compliance to medications stressed.    Reviewed available prior notes, consults, prior visits, laboratory findings, radiology and cardiology relevant reports. Updated chart as applicable. I have reviewed the patient's medical history in detail and updated the computerized patient record as relevant.      Updated patient regarding any new or relevant abnormalities on review of records or any new findings on physical exam. Mentioned to patient about  purpose of visit and desirable health short and long term goals and objectives.    Primary to monitor CBC CMP Lipid panel and TSH as applicable    ___________________________________________________________________________________________________________________________________________        Diagnoses and all orders for this visit:    1. S/P CABG x 2 (Primary)    2. Stented coronary artery     3. Obstructive sleep apnea on CPAP    4. Mixed hyperlipidemia    5. Essential hypertension    6. Coronary artery disease of bypass graft of native heart with stable angina pectoris (HCC)    7. Severe obesity (BMI 35.0-39.9) with comorbidity (HCC)    8. Coronary artery disease with exertional angina (HCC)           Plan    The current medical regimen is effective;  continue present plan and medications.     Check BP and heart rates twice daily initially till blood pressures and heart rates under good control and then at least 3x / week,   If blood pressures continue to be well-controlled then can check week a month  at home and bring a recording for review next visit  If BP >130/85 or < 100/60 persistently over 3 reading 30 mins apart or if heart rates persistently above 100 bpm or less than 55 bpm call sooner for evaluation and advise     Complete cardiac rehab     Patient expressed understanding  Encouraged and answered all questions   Discussed with the patient and all questioned fully answered. He will call me if any problems arise.   Discussed results of prior testing with patient : cath and reviewed images again     Overall doing well no new cardiovascular symptoms and therefore no additional cardiac testing is required   If any interim issues arise will call me for further evaluation.     Keep LDL below 70 mg/dl. Monitor liver and renal functions.   Monitor CBC, CMP, TSH (as indicated) and Lipid Panel by primary     S/L NTG PRN for chest pain, call me or go to ER if has to use S/L nitroglycerin     I support the  patient's decision to take the Covid -19 vaccine   Had required complete course   No major issues   Now fully immunized    Recommend booster       Patient was advised to continue CPAP daily.   Follow up with Fatmata SOARES , Jp SOARES  or myself              Return in about 3 months (around 8/13/2022).

## 2022-05-16 ENCOUNTER — TREATMENT (OUTPATIENT)
Dept: CARDIAC REHAB | Facility: HOSPITAL | Age: 67
End: 2022-05-16

## 2022-05-16 DIAGNOSIS — Z95.1 S/P CABG X 2: Primary | ICD-10-CM

## 2022-05-16 PROCEDURE — 93798 PHYS/QHP OP CAR RHAB W/ECG: CPT

## 2022-05-18 ENCOUNTER — TREATMENT (OUTPATIENT)
Dept: CARDIAC REHAB | Facility: HOSPITAL | Age: 67
End: 2022-05-18

## 2022-05-18 DIAGNOSIS — Z95.1 S/P CABG X 2: Primary | ICD-10-CM

## 2022-05-18 PROCEDURE — 93798 PHYS/QHP OP CAR RHAB W/ECG: CPT

## 2022-05-23 ENCOUNTER — APPOINTMENT (OUTPATIENT)
Dept: CARDIAC REHAB | Facility: HOSPITAL | Age: 67
End: 2022-05-23

## 2022-05-25 ENCOUNTER — APPOINTMENT (OUTPATIENT)
Dept: CARDIAC REHAB | Facility: HOSPITAL | Age: 67
End: 2022-05-25

## 2022-05-27 ENCOUNTER — APPOINTMENT (OUTPATIENT)
Dept: CARDIAC REHAB | Facility: HOSPITAL | Age: 67
End: 2022-05-27

## 2022-06-01 ENCOUNTER — APPOINTMENT (OUTPATIENT)
Dept: CARDIAC REHAB | Facility: HOSPITAL | Age: 67
End: 2022-06-01

## 2022-06-03 ENCOUNTER — TREATMENT (OUTPATIENT)
Dept: CARDIAC REHAB | Facility: HOSPITAL | Age: 67
End: 2022-06-03

## 2022-06-03 DIAGNOSIS — Z95.1 S/P CABG X 2: Primary | ICD-10-CM

## 2022-06-03 PROCEDURE — 93798 PHYS/QHP OP CAR RHAB W/ECG: CPT

## 2022-06-06 ENCOUNTER — TREATMENT (OUTPATIENT)
Dept: CARDIAC REHAB | Facility: HOSPITAL | Age: 67
End: 2022-06-06

## 2022-06-06 DIAGNOSIS — Z95.1 S/P CABG X 2: Primary | ICD-10-CM

## 2022-06-06 PROCEDURE — 93798 PHYS/QHP OP CAR RHAB W/ECG: CPT

## 2022-06-08 ENCOUNTER — APPOINTMENT (OUTPATIENT)
Dept: CARDIAC REHAB | Facility: HOSPITAL | Age: 67
End: 2022-06-08

## 2022-06-08 ENCOUNTER — TREATMENT (OUTPATIENT)
Dept: CARDIAC REHAB | Facility: HOSPITAL | Age: 67
End: 2022-06-08

## 2022-06-08 DIAGNOSIS — Z95.1 S/P CABG X 2: Primary | ICD-10-CM

## 2022-06-08 PROCEDURE — 93798 PHYS/QHP OP CAR RHAB W/ECG: CPT

## 2022-06-10 ENCOUNTER — APPOINTMENT (OUTPATIENT)
Dept: CARDIAC REHAB | Facility: HOSPITAL | Age: 67
End: 2022-06-10

## 2022-06-10 ENCOUNTER — TREATMENT (OUTPATIENT)
Dept: CARDIAC REHAB | Facility: HOSPITAL | Age: 67
End: 2022-06-10

## 2022-06-10 DIAGNOSIS — Z95.1 S/P CABG X 2: Primary | ICD-10-CM

## 2022-06-10 PROCEDURE — 93798 PHYS/QHP OP CAR RHAB W/ECG: CPT

## 2022-06-13 ENCOUNTER — TREATMENT (OUTPATIENT)
Dept: CARDIAC REHAB | Facility: HOSPITAL | Age: 67
End: 2022-06-13

## 2022-06-13 DIAGNOSIS — Z95.1 S/P CABG X 2: Primary | ICD-10-CM

## 2022-06-13 PROCEDURE — 93798 PHYS/QHP OP CAR RHAB W/ECG: CPT

## 2022-06-15 ENCOUNTER — APPOINTMENT (OUTPATIENT)
Dept: CARDIAC REHAB | Facility: HOSPITAL | Age: 67
End: 2022-06-15

## 2022-06-17 ENCOUNTER — APPOINTMENT (OUTPATIENT)
Dept: CARDIAC REHAB | Facility: HOSPITAL | Age: 67
End: 2022-06-17

## 2022-06-22 ENCOUNTER — APPOINTMENT (OUTPATIENT)
Dept: CARDIAC REHAB | Facility: HOSPITAL | Age: 67
End: 2022-06-22

## 2022-06-24 ENCOUNTER — APPOINTMENT (OUTPATIENT)
Dept: CARDIAC REHAB | Facility: HOSPITAL | Age: 67
End: 2022-06-24

## 2022-06-29 ENCOUNTER — APPOINTMENT (OUTPATIENT)
Dept: CARDIAC REHAB | Facility: HOSPITAL | Age: 67
End: 2022-06-29

## 2022-07-01 ENCOUNTER — APPOINTMENT (OUTPATIENT)
Dept: CARDIAC REHAB | Facility: HOSPITAL | Age: 67
End: 2022-07-01

## 2022-07-06 ENCOUNTER — APPOINTMENT (OUTPATIENT)
Dept: CARDIAC REHAB | Facility: HOSPITAL | Age: 67
End: 2022-07-06

## 2022-08-15 PROBLEM — E66.812 CLASS 2 SEVERE OBESITY DUE TO EXCESS CALORIES WITH SERIOUS COMORBIDITY AND BODY MASS INDEX (BMI) OF 35.0 TO 35.9 IN ADULT: Status: ACTIVE | Noted: 2020-10-20

## 2022-08-15 NOTE — PROGRESS NOTES
Subjective:     Encounter Date: 8/16/2022      Patient ID: Lio Velasquez is a 66 y.o. male     HPI: This patient presents today for routine follow-up. He has coronary artery disease status post CABG x2 (LIMA to LAD and SVG to RCA) with subsequent 3.25 x 38 mm stent to the saphenous vein graft to the right coronary artery on 7/8/2016 and 3.5 x 38 mm Jamee drug-eluting stent to the mid saphenous vein graft to the right coronary artery and 3.5 x 23 mm Jamee drug-eluting stent to the distal saphenous vein graft to the right coronary artery 8/12/2019 and redo CABG X1 (SVG to RCA) 10/22 at [place] with subsequent 2.0 X 8 mm Medtronic BRIANNA X2 to PDA via SVG to RCA and 2.5 X 18 mm Xience BRIANNA and 2.5 X 16 mm Synergy BRIANNA to LCX for  4/27/22, hypertension, hyperlipidemia, obstructive sleep apnea and obesity. He reports dizziness with position changes. Patient denies chest pain, shortness of breath, palpitations, syncope, orthopnea, PND, edema or decreased stamina.  Patient denies any signs of bleeding.    Chief Complaint: Routine follow-up  Coronary Artery Disease  Presents for follow-up visit. Symptoms include dizziness. Pertinent negatives include no chest pain, chest pressure, chest tightness, leg swelling, muscle weakness, palpitations, shortness of breath or weight gain. Risk factors include hyperlipidemia and obesity. The symptoms have been stable. Compliance with diet is variable. Compliance with exercise is variable. Compliance with medications is good.   Hypertension  This is a chronic problem. The current episode started more than 1 year ago. The problem is controlled. Pertinent negatives include no anxiety, blurred vision, chest pain, headaches, malaise/fatigue, neck pain, orthopnea, palpitations, peripheral edema, PND, shortness of breath or sweats. Risk factors for coronary artery disease include male gender, obesity and dyslipidemia. Current antihypertension treatment includes beta blockers and ACE  inhibitors. The current treatment provides significant improvement. Hypertensive end-organ damage includes CAD/MI.   Hyperlipidemia  This is a chronic problem. The current episode started more than 1 year ago. Exacerbating diseases include obesity. Pertinent negatives include no chest pain or shortness of breath. Current antihyperlipidemic treatment includes statins. Risk factors for coronary artery disease include hypertension, male sex, obesity and dyslipidemia.       Previous Cardiac Testing:  Results for orders placed during the hospital encounter of 10/03/20    Adult Transthoracic Echo Complete W/ Cont if Necessary Per Protocol    Interpretation Summary  · Left ventricular ejection fraction appears to be 56 - 60%. Left ventricular systolic function is normal.  · Normal diastolic function  · No pulmonary hypertension    Results for orders placed during the hospital encounter of 10/03/20   Cardiac Catheterization/Vascular Study    Narrative · Mid LAD lesion is 100% stenosed.  · Prox RCA lesion is 100% stenosed. Vessel two-vessel collaterals noted   from proximal to distal right coronary arteryAlso collaterals noted to   obtuse marginal branch  · Origin to Prox Graft lesion is 100% stenosed.  · Mild systolic dysfunction.  · The ejection fraction was 40-50% by visual estimate.  · No mitral valve regurgitation.  · No aortic valve stenosis.  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    Allergies   Allergen Reactions   • Demerol [Meperidine] Itching   • Morphine And Related Itching       Current Outpatient Medications:   •  aspirin 81 MG tablet, Take 81 mg by  mouth every night., Disp: , Rfl:   •  atorvastatin (LIPITOR) 80 MG tablet, Take 1 tablet by mouth Daily., Disp: 30 tablet, Rfl: 11  •  carvedilol (COREG) 12.5 MG tablet, Take 1 tablet by mouth 2 (Two) Times a Day., Disp: 180 tablet, Rfl: 3  •  clopidogrel (PLAVIX) 75 MG tablet, Take 1 tablet by mouth Daily., Disp: 90 tablet, Rfl: 3  •  isosorbide mononitrate (IMDUR) 120 MG 24 hr tablet, Take 1 tablet by mouth Daily., Disp: 180 tablet, Rfl: 3  •  Multiple Vitamins-Minerals (CENTRUM CARDIO PO), Take 1 tablet by mouth Every Night., Disp: , Rfl:   •  nitroglycerin (NITROSTAT) 0.4 MG SL tablet, Place 1 tablet under the tongue Every 5 (Five) Minutes As Needed for Chest Pain. 1 under the tongue as needed for angina, may repeat q5mins for up three doses, Disp: , Rfl:   •  ramipril (ALTACE) 2.5 MG capsule, Take 1 capsule by mouth Every Night., Disp: 90 capsule, Rfl: 3  Past Medical History:   Diagnosis Date   • Aortocoronary bypass status    • CAD in native artery    • Chest pain    • Coronary angioplasty status    • Hyperlipidemia     unspecified   • Hypertension, benign    • Obesity    • Sleep apnea     unspecified     Past Surgical History:   Procedure Laterality Date   • ANKLE SURGERY      with screws    • CARDIAC CATHETERIZATION Left 03/05/2012 7/2016   • CARDIAC CATHETERIZATION Left 08/12/2019    Procedure: Cardiac Catheterization/Vascular Study BRIANNA OK;  Surgeon: Storm Encinas MD;  Location:  [facility] CATH INVASIVE LOCATION;  Service: Cardiology   • CARDIAC CATHETERIZATION Left 10/04/2020    Procedure: Cardiac Catheterization/Vascular Study 6F Right groin ;  Surgeon: Storm Encinas MD;  Location:  [facility] CATH INVASIVE LOCATION;  Service: Cardiology;  Laterality: Left;   • CARDIAC CATHETERIZATION N/A 04/11/2022    Procedure: Left Heart Cath;  Surgeon: Storm Encinas MD;  Location:  [facility] CATH INVASIVE LOCATION;  Service: Cardiology;  Laterality: N/A;   • CARDIAC CATHETERIZATION N/A 04/27/2022    Procedure: Cardiac  Catheterization/Vascular Study  of LCx and possible PCI SVG to RPDA;  Surgeon: Cruz Garcia MD;  Location: [facility] CATH INVASIVE LOCATION;  Service: Cardiology;  Laterality: N/A;   • COLONOSCOPY  12/30/2008   • COLONOSCOPY  12/30/2013    two polyps removed from the sigmoid colon   • COLONOSCOPY N/A 06/21/2019    Procedure: COLONOSCOPY WITH ANESTHESIA;  Surgeon: Marciano Jaramillo DO;  Location: [facility] ENDOSCOPY;  Service: Gastroenterology   • CORONARY ARTERY BYPASS GRAFT  1993    x2   • CORONARY ARTERY BYPASS GRAFT  10/17/2021   • CORONARY STENT PLACEMENT  2019    x5   • JOINT REPLACEMENT Left     left ankle   • SHOULDER SURGERY Right     with chano   • WRIST SURGERY Left      Family History   Problem Relation Age of Onset   • Diabetes Mother         type II   • Heart failure Mother         congestive heart failure   • Colon polyps Neg Hx    • Esophageal cancer Neg Hx    • Rectal cancer Neg Hx    • Colon cancer Neg Hx      Social History     Socioeconomic History   • Marital status:    Tobacco Use   • Smoking status: Never Smoker   • Smokeless tobacco: Never Used   Vaping Use   • Vaping Use: Never used   Substance and Sexual Activity   • Alcohol use: Not Currently     Comment: occasional   • Drug use: No   • Sexual activity: Defer       Review of Systems   Constitutional: Negative for chills, decreased appetite, fever, malaise/fatigue, night sweats, weight gain and weight loss.   HENT: Negative for nosebleeds.    Eyes: Negative for blurred vision and visual disturbance.   Cardiovascular: Negative for chest pain, dyspnea on exertion, leg swelling, near-syncope, orthopnea, palpitations, paroxysmal nocturnal dyspnea and syncope.   Respiratory: Negative for chest tightness, cough, hemoptysis, shortness of breath, snoring and wheezing.    Endocrine: Negative for cold intolerance, heat intolerance and polydipsia.   Hematologic/Lymphatic: Does not bruise/bleed easily.   Skin: Negative for rash.   Musculoskeletal:  Negative for back pain, falls, muscle weakness and neck pain.   Gastrointestinal: Negative for abdominal pain, change in bowel habit, constipation, diarrhea, dysphagia, flatus, heartburn, nausea and vomiting.   Genitourinary: Negative for hematuria.   Neurological: Positive for dizziness. Negative for headaches, light-headedness and weakness.   Psychiatric/Behavioral: Negative for altered mental status.   Allergic/Immunologic: Negative for persistent infections.              Objective:     Vitals and nursing note reviewed.   Constitutional:       General: Awake.      Appearance: Normal and healthy appearance. Well-developed and not in distress. Obese.   Eyes:      General: Lids are normal.      Conjunctiva/sclera: Conjunctivae normal.      Pupils: Pupils are equal, round, and reactive to light.   HENT:      Head: Normocephalic and atraumatic.      Nose: Nose normal.   Neck:      Vascular: No JVR. JVD normal.   Pulmonary:      Effort: Pulmonary effort is normal.      Breath sounds: Normal breath sounds. No wheezing. No rhonchi. No rales.   Chest:      Chest wall: Not tender to palpatation.   Cardiovascular:      PMI at left midclavicular line. Bradycardia present. Regular rhythm. Normal S1. Normal S2.      Murmurs: There is no murmur.      No gallop. No click. No rub.   Pulses:     Intact distal pulses.   Edema:     Peripheral edema absent.   Abdominal:      General: Bowel sounds are normal.      Palpations: Abdomen is soft.      Tenderness: There is no abdominal tenderness.   Musculoskeletal: Normal range of motion.         General: No tenderness.      Cervical back: Normal range of motion. Skin:     General: Skin is warm and dry.   Neurological:      General: No focal deficit present.      Mental Status: Alert, oriented to person, place, and time and oriented to person, place and time.   Psychiatric:         Attention and Perception: Attention and perception normal.         Mood and Affect: Mood and affect normal.   "       Speech: Speech normal.         Behavior: Behavior normal. Behavior is cooperative.         Thought Content: Thought content normal.         Cognition and Memory: Cognition and memory normal.         Judgment: Judgment normal.           Procedures  /82   Pulse 58   Ht 182.9 cm (72\")   Wt 118 kg (261 lb)   SpO2 98%   BMI 35.40 kg/m²   Lab Review:   Lab Results   Component Value Date    WBC 7.07 04/28/2022    HGB 11.9 (L) 04/28/2022    HCT 35.6 (L) 04/28/2022    HCT 35.1 (L) 04/28/2022    MCV 92.9 04/28/2022     04/28/2022     04/28/2022     Lab Results   Component Value Date    GLUCOSE 122 (H) 04/28/2022    BUN 11 04/28/2022    CREATININE 0.93 04/28/2022    EGFRIFNONA 73 02/08/2021    EGFRIFAFRI 84 02/08/2021    BCR 11.8 04/28/2022    K 3.7 04/28/2022    CO2 23.0 04/28/2022    CALCIUM 9.2 04/28/2022    PROTENTOTREF 6.8 02/08/2021    ALBUMIN 4.20 04/27/2022    LABIL2 2.1 02/08/2021    AST 17 04/27/2022    ALT 17 04/27/2022     Lab Results   Component Value Date    CHOL 119 10/04/2020    CHOL 129 (L) 08/13/2019    CHLPL 143 07/07/2016     Lab Results   Component Value Date    TRIG 192 (H) 10/04/2020    TRIG 180 (H) 08/13/2019    TRIG 180 (H) 07/07/2016     Lab Results   Component Value Date    HDL 28 (L) 10/04/2020    HDL 27 (L) 08/13/2019    HDL 31 07/07/2016     Lab Results   Component Value Date    LDL 53 10/04/2020    LDL 87 08/13/2019    LDL 93 07/07/2016       I have reviewed the most recent lab results.       Assessment:          Diagnosis Plan   1. Coronary artery disease of bypass graft of native heart with stable angina pectoris (CMS/[facility])   stable.  Continue Imdur.   2. S/P CABG x 2  LIMA to LAD and SVG to RCA (occluded) 1993 with redo CABG 10/22 at [place].   3. Stented coronary artery   Continues on aspirin and Brilinta. Denies bleeding.    4. Presence of coronary artery bypass graft stent  SVG to RCA X3 most recent 8/12/19   5. Essential hypertension  Blood pressure is " mildly elevated in office today. Pt reports consistently lower than 130/90 at home. Continue carvedilol and ramipril.    6. Mixed hyperlipidemia  Managed by PCP. On statin. Well controlled.    7. Obstructive sleep apnea on CPAP  Compliant.    8. Class 2 severe obesity due to excess calories with serious comorbidity and body mass index (BMI) of 35.0 to 35.9 in adult (HCC) Patient's Body mass index is 35.4 kg/m². BMI is above normal parameters. Recommendations include: educational material.            Plan:         1. Continue medications as previously prescribed.  2. Report any worsening symptoms.  3. Report any signs of bleeding.  4. Continue heart healthy diet and regular exercise as tolerated.   5. Follow up with PCP for blood pressure and cholesterol management and routine lab work.  6. Follow up in three months, or sooner if needed.   7.  Monitor and record daily blood pressure. Report readings consistently higher than 130/90 or consistently lower than 100/60.

## 2022-08-16 ENCOUNTER — OFFICE VISIT (OUTPATIENT)
Dept: CARDIOLOGY | Facility: CLINIC | Age: 67
End: 2022-08-16

## 2022-08-16 VITALS
WEIGHT: 261 LBS | HEIGHT: 72 IN | BODY MASS INDEX: 35.35 KG/M2 | HEART RATE: 58 BPM | SYSTOLIC BLOOD PRESSURE: 134 MMHG | DIASTOLIC BLOOD PRESSURE: 82 MMHG | OXYGEN SATURATION: 98 %

## 2022-08-16 DIAGNOSIS — E66.01 CLASS 2 SEVERE OBESITY DUE TO EXCESS CALORIES WITH SERIOUS COMORBIDITY AND BODY MASS INDEX (BMI) OF 35.0 TO 35.9 IN ADULT: ICD-10-CM

## 2022-08-16 DIAGNOSIS — Z99.89 OBSTRUCTIVE SLEEP APNEA ON CPAP: Chronic | ICD-10-CM

## 2022-08-16 DIAGNOSIS — I10 ESSENTIAL HYPERTENSION: Chronic | ICD-10-CM

## 2022-08-16 DIAGNOSIS — I25.708 CORONARY ARTERY DISEASE OF BYPASS GRAFT OF NATIVE HEART WITH STABLE ANGINA PECTORIS: Primary | ICD-10-CM

## 2022-08-16 DIAGNOSIS — Z95.5 STENTED CORONARY ARTERY: ICD-10-CM

## 2022-08-16 DIAGNOSIS — E78.2 MIXED HYPERLIPIDEMIA: Chronic | ICD-10-CM

## 2022-08-16 DIAGNOSIS — Z95.1 PRESENCE OF CORONARY ARTERY BYPASS GRAFT STENT: ICD-10-CM

## 2022-08-16 DIAGNOSIS — Z95.5 PRESENCE OF CORONARY ARTERY BYPASS GRAFT STENT: ICD-10-CM

## 2022-08-16 DIAGNOSIS — Z95.1 S/P CABG X 2: ICD-10-CM

## 2022-08-16 DIAGNOSIS — G47.33 OBSTRUCTIVE SLEEP APNEA ON CPAP: Chronic | ICD-10-CM

## 2022-08-16 PROCEDURE — 99214 OFFICE O/P EST MOD 30 MIN: CPT | Performed by: NURSE PRACTITIONER

## 2022-08-16 RX ORDER — CLOPIDOGREL BISULFATE 75 MG/1
75 TABLET ORAL DAILY
Qty: 90 TABLET | Refills: 3 | Status: SHIPPED | OUTPATIENT
Start: 2022-08-16

## 2022-08-16 RX ORDER — CARVEDILOL 12.5 MG/1
12.5 TABLET ORAL 2 TIMES DAILY
Qty: 180 TABLET | Refills: 3 | Status: SHIPPED | OUTPATIENT
Start: 2022-08-16

## 2022-08-16 RX ORDER — RAMIPRIL 2.5 MG/1
2.5 CAPSULE ORAL NIGHTLY
Qty: 90 CAPSULE | Refills: 3 | Status: SHIPPED | OUTPATIENT
Start: 2022-08-16

## 2022-11-15 ENCOUNTER — OFFICE VISIT (OUTPATIENT)
Dept: CARDIOLOGY | Facility: CLINIC | Age: 67
End: 2022-11-15

## 2022-11-15 VITALS
BODY MASS INDEX: 35.89 KG/M2 | SYSTOLIC BLOOD PRESSURE: 128 MMHG | WEIGHT: 265 LBS | HEART RATE: 60 BPM | DIASTOLIC BLOOD PRESSURE: 67 MMHG | OXYGEN SATURATION: 97 % | HEIGHT: 72 IN

## 2022-11-15 DIAGNOSIS — G47.33 OBSTRUCTIVE SLEEP APNEA ON CPAP: ICD-10-CM

## 2022-11-15 DIAGNOSIS — Z99.89 OBSTRUCTIVE SLEEP APNEA ON CPAP: ICD-10-CM

## 2022-11-15 DIAGNOSIS — E78.2 MIXED HYPERLIPIDEMIA: ICD-10-CM

## 2022-11-15 DIAGNOSIS — E66.01 CLASS 2 SEVERE OBESITY DUE TO EXCESS CALORIES WITH SERIOUS COMORBIDITY AND BODY MASS INDEX (BMI) OF 35.0 TO 35.9 IN ADULT: ICD-10-CM

## 2022-11-15 DIAGNOSIS — I25.810 CORONARY ARTERY DISEASE INVOLVING CORONARY BYPASS GRAFT OF NATIVE HEART WITHOUT ANGINA PECTORIS: Primary | ICD-10-CM

## 2022-11-15 DIAGNOSIS — I10 ESSENTIAL HYPERTENSION: ICD-10-CM

## 2022-11-15 PROCEDURE — 93000 ELECTROCARDIOGRAM COMPLETE: CPT | Performed by: NURSE PRACTITIONER

## 2022-11-15 PROCEDURE — 99214 OFFICE O/P EST MOD 30 MIN: CPT | Performed by: NURSE PRACTITIONER

## 2022-11-15 NOTE — PROGRESS NOTES
Subjective:     Encounter Date:11/15/2022      Patient ID: Lio Velasquez is a 66 y.o. male with  coronary artery disease status post CABG x2 (LIMA to LAD and SVG to RCA) with subsequent 3.25 x 38 mm stent to the saphenous vein graft to the right coronary artery on 7/8/2016 and 3.5 x 38 mm Jamee drug-eluting stent to the mid saphenous vein graft to the right coronary artery and 3.5 x 23 mm Jamee drug-eluting stent to the distal saphenous vein graft to the right coronary artery 8/12/2019 and redo CABG X1 (SVG to RCA) 10/21 at [place] with subsequent 2.0 X 8 mm Medtronic BRIANNA X2 to PDA via SVG to RCA and 2.5 X 18 mm Xience BRIANNA and 2.5 X 16 mm Synergy BRIANNA to LCX for  4/27/22, hypertension, hyperlipidemia, obstructive sleep apnea and obesity.    Chief Complaint:3 month follow up  Coronary Artery Disease  Presents for follow-up visit. Pertinent negatives include no chest pain, chest pressure, chest tightness, dizziness (rare), leg swelling, muscle weakness, palpitations, shortness of breath or weight gain. The symptoms have been stable. Compliance with diet is good. Compliance with exercise is good. Compliance with medications is good.   Hypertension  This is a chronic problem. The current episode started more than 1 year ago. The problem is controlled. Pertinent negatives include no chest pain, malaise/fatigue, orthopnea, palpitations, peripheral edema, PND or shortness of breath. Risk factors for coronary artery disease include dyslipidemia, obesity and male gender. Current antihypertension treatment includes ACE inhibitors, beta blockers and direct vasodilators. Hypertensive end-organ damage includes CAD/MI.     Patient presents today for management of coronary artery disease. Patient reports that he has been doing well. He denies any chest pain. He denies any decreased stamina or worsening fatigue. He denies any dyspnea on exertion. He denies any leg swelling, orthopnea or PND. He denies any heart racing  or palpitations. He reports that he has had difficulty loosing weight no matter how little he eats. He reports that he doesn't sleep well. He has discussed with PCP about being possibly borderline diabetic. He reports that he has dizziness with position changes. He reports that it is very brief and resolves on its own. He reports that he monitors his blood pressure and it remains well controlled.     The following portions of the patient's history were reviewed and updated as appropriate: allergies, current medications, past family history, past medical history, past social history, past surgical history and problem list.    Allergies   Allergen Reactions   • Demerol [Meperidine] Itching   • Morphine And Related Itching       Current Outpatient Medications:   •  aspirin 81 MG tablet, Take 81 mg by mouth every night., Disp: , Rfl:   •  atorvastatin (LIPITOR) 80 MG tablet, Take 1 tablet by mouth Daily., Disp: 30 tablet, Rfl: 11  •  carvedilol (COREG) 12.5 MG tablet, Take 1 tablet by mouth 2 (Two) Times a Day., Disp: 180 tablet, Rfl: 3  •  clopidogrel (PLAVIX) 75 MG tablet, Take 1 tablet by mouth Daily., Disp: 90 tablet, Rfl: 3  •  isosorbide mononitrate (IMDUR) 120 MG 24 hr tablet, Take 1 tablet by mouth Daily., Disp: 180 tablet, Rfl: 3  •  Multiple Vitamins-Minerals (CENTRUM CARDIO PO), Take 1 tablet by mouth Every Night., Disp: , Rfl:   •  nitroglycerin (NITROSTAT) 0.4 MG SL tablet, Place 1 tablet under the tongue Every 5 (Five) Minutes As Needed for Chest Pain. 1 under the tongue as needed for angina, may repeat q5mins for up three doses, Disp: , Rfl:   •  ramipril (ALTACE) 2.5 MG capsule, Take 1 capsule by mouth Every Night., Disp: 90 capsule, Rfl: 3  Past Medical History:   Diagnosis Date   • Aortocoronary bypass status    • CAD in native artery    • Chest pain    • Coronary angioplasty status    • Hyperlipidemia     unspecified   • Hypertension, benign    • Obesity    • Sleep apnea     unspecified     Social  History     Socioeconomic History   • Marital status:    Tobacco Use   • Smoking status: Never   • Smokeless tobacco: Never   Vaping Use   • Vaping Use: Never used   Substance and Sexual Activity   • Alcohol use: Not Currently     Comment: occasional   • Drug use: No   • Sexual activity: Defer       Review of Systems   Constitutional: Negative for malaise/fatigue and weight gain.   HENT: Negative for nosebleeds.    Cardiovascular: Negative for chest pain, dyspnea on exertion, irregular heartbeat, leg swelling, near-syncope, orthopnea, palpitations, paroxysmal nocturnal dyspnea and syncope.   Respiratory: Negative for chest tightness and shortness of breath.    Hematologic/Lymphatic: Does not bruise/bleed easily.   Musculoskeletal: Negative for muscle weakness.   Genitourinary: Negative for hematuria.   Neurological: Negative for dizziness (rare) and weakness.   All other systems reviewed and are negative.         Objective:     Vitals reviewed.   Constitutional:       General: Not in acute distress.     Appearance: Normal appearance. Well-developed.   Eyes:      Pupils: Pupils are equal, round, and reactive to light.   HENT:      Head: Normocephalic and atraumatic.      Nose: Nose normal.   Neck:      Vascular: No carotid bruit.   Pulmonary:      Effort: Pulmonary effort is normal. No respiratory distress.      Breath sounds: Normal breath sounds. No wheezing. No rales.   Cardiovascular:      Normal rate. Regular rhythm.      Murmurs: There is no murmur.   Edema:     Peripheral edema absent.   Abdominal:      General: There is no distension.      Palpations: Abdomen is soft.   Musculoskeletal: Normal range of motion.      Cervical back: Normal range of motion and neck supple. Skin:     General: Skin is warm.      Findings: No erythema or rash.   Neurological:      Mental Status: Alert and oriented to person, place, and time.   Psychiatric:         Speech: Speech normal.         Behavior: Behavior normal.     "     Thought Content: Thought content normal.         Judgment: Judgment normal.         /67 (BP Location: Left arm, Patient Position: Sitting, Cuff Size: Large Adult)   Pulse 60   Ht 182.9 cm (72\")   Wt 120 kg (265 lb)   SpO2 97%   BMI 35.94 kg/m²       ECG 12 Lead    Date/Time: 11/15/2022 2:04 PM  Performed by: Jp Hunter APRN  Authorized by: Jp Hunter APRN   Comparison: compared with previous ECG from 4/28/2022  Similar to previous ECG  Rhythm: sinus rhythm  Rate: normal  BPM: 60              Lab Review:       Results for orders placed during the hospital encounter of 10/03/20    Adult Transthoracic Echo Complete W/ Cont if Necessary Per Protocol    Interpretation Summary  · Left ventricular ejection fraction appears to be 56 - 60%. Left ventricular systolic function is normal.  · Normal diastolic function  · No pulmonary hypertension    [facility] 4/2022:  Indications    Exertional angina (HCC) [I20.8 (ICD-10-CM)]         Conclusion    Procedure: Conscious sedation, selective coronary angiography, angiography of bypass grafts, Chronic total occlusion procedure to the LCX and stent placement to the distal RCA via the SVG using drug eluting stents      Risk, Benefits, and Alternatives discussed with the patient and/or family.  Plan is for moderate sedation, and the patient agrees to proceed with the procedure.  An immediate assessment was done prior to the administration of moderate sedation.     Indications: unstable angina  Type and site of entry: percutaneous right femoral artery using the Seldinger technique   Contrast used: Isovue  Findings:      Selective Coronary Angiography:  #1.  The left main coronary artery is an average size vessel with no significant disease  #2.  The left anterior descending coronary artery is totally occluded in the mid portion  #3.  The left circumflex coronary artery is an average size vessel with two OM branches. The LCX is totally occluded just distal to the 2nd " OM  #4  The right coronary artery is totally occluded in the proximal portion.  #5. The SVG to the RCA is patent but there is a 99% stenosis at the insertion site.      of the LCX  Guide 6Fr XB3.5, guide wire 0.014 PT graphix  Comment: A 0.014 Fighter wire was successfully advanced through the total occlusion using a mamba support catheter and into the true distal lumen. The lesion was pre dilated with a 1.5 mm  balloon, followed by a 2.5 mm balloon at 6 margoth for 30 sec. This was followed by a 2.5 x 18 mm Xience BRIANNA at 15 MARGOTH for 30 sec just distal to the 2nd OM. This was followed by a 2.5 x 16 mm Synergy BRIANNA just proximal to this stent resulting in no residual stenosis within the stented segment and ROSS 3 flow distally. The 2nd OM had ROSS 1 flow as well as the distal 3rd OM     Stent placement to the RCA via the SVG  Guide 6 Fr RCB guide, guide wire 0.014 PT graphix x2  Comment: The wire was advanced through the stenosis down into the distal PDA. A second graphix was the advanced retrograde up the PDA. The vessel was dilated with a 2.0 balloon over each wire to 6 margoth each. A 2.0 x 8 mm Medtronic BRIANNA was then deployed at 14 margoth for 30 sec into the distal PDA and a similar stent was then deployed in the retrograde PDA in similar fashion. This resulted in no residual stenosis and ROSS 3 flow distally     Conscious Sedation  I supervised the administration of conscious sedation by the nursing staff. The first dose was given at 1121 and the end of the face to face encounter was at 1325. During this time, BP, HR, oximetry, and neurologic status were closely monitored.     Estimated Blood Loss: none  Specimens: None  Complications: None      Conclusion:  #1 status post successful  procedure to the LCX using drug-eluting stents  #2 s/p successful BRIANAN placement x2 to the PDA via the RCA SVG     Final Comment:  Pt tolerated the procedure well. There were no obvious complications. Hemostasis was achieved with a 6F  perclose hemostatic device.    Lab Results   Component Value Date    CHOL 119 10/04/2020    CHLPL 143 07/07/2016    TRIG 192 (H) 10/04/2020    HDL 28 (L) 10/04/2020    LDL 53 10/04/2020     I have personally reviewed echo, LHC, labs and past office notes prior to patients visit  Assessment:          Diagnosis Plan   1. Coronary artery disease involving coronary bypass graft of native heart without angina pectoris  ECG 12 Lead      2. Essential hypertension        3. Mixed hyperlipidemia        4. Obstructive sleep apnea on CPAP        5. Class 2 severe obesity due to excess calories with serious comorbidity and body mass index (BMI) of 35.0 to 35.9 in adult (HCC)               Plan:       1. CAD: status post CABG x2 (LIMA to LAD and SVG to RCA) with subsequent 3.25 x 38 mm stent to the saphenous vein graft to the right coronary artery on 7/8/2016 and 3.5 x 38 mm Jamee drug-eluting stent to the mid saphenous vein graft to the right coronary artery and 3.5 x 23 mm Jamee drug-eluting stent to the distal saphenous vein graft to the right coronary artery 8/12/2019 and redo CABG X1 (SVG to RCA) 10/21 at [place] with subsequent 2.0 X 8 mm Medtronic BRIANNA X2 to PDA via SVG to RCA and 2.5 X 18 mm Xience BRIANNA and 2.5 X 16 mm Synergy BRIANNA to LCX for  4/27/22. Patient remains chest pain free. Continue aspirin, plavix, atorvastatin, carvedilol and imdur.     2. Hypertension: well controlled. Continue current medications. Recommend to monitor BP routinely and notify office if consistently running >140/90.    3. Hyperlipidemia: Lipid panel from 2020 demonstrated elevated triglycerides. LDL was controlled at 53. Continue atorvastatin. Will obtain most recent lipid panel from PCP.     4. CASSY: patient reports compliance with CPAP.     5. Obesity: Class 2 Severe Obesity (BMI >=35 and <=39.9). Obesity-related health conditions include the following: obstructive sleep apnea, hypertension, coronary heart disease and dyslipidemias.  Obesity is unchanged. BMI is is above average; BMI management plan is completed. We discussed portion control and increasing exercise.    Patient is to follow up in 6 months or sooner if needed

## 2023-01-13 RX ORDER — ATORVASTATIN CALCIUM 80 MG/1
80 TABLET, FILM COATED ORAL DAILY
Qty: 90 TABLET | Refills: 3 | Status: SHIPPED | OUTPATIENT
Start: 2023-01-13

## 2023-01-13 NOTE — TELEPHONE ENCOUNTER
SPOKE WITH PT - HE NEEDS A 90 DAY SUPPLY - WILL BE OUT OF TOWN FOR TWO MONTHS - REVISED RX IS ATTACHED, PLEASE SIGN

## 2023-01-13 NOTE — TELEPHONE ENCOUNTER
Caller: Lio Velasquez    Relationship: Self    Best call back number: [phone]    Requested Prescriptions:   Requested Prescriptions     Pending Prescriptions Disp Refills   • atorvastatin (LIPITOR) 80 MG tablet 30 tablet 11     Sig: Take 1 tablet by mouth Daily.        Pharmacy where request should be sent: [facility] [address] [facility] [phone] PH - [phone]      Additional details provided by patient: PT IS COMPLETELY OUT. HE IS GETTING READY TO LEAVE [facility] FOR TWO MONTHS AND WOULD LIKE TO KNOW IF HE CAN GET A 90 DAY SUPPLY SENT IN.    Does the patient have less than a 3 day supply:  [x] Yes  [] No      Akanksha Mishra Rep   01/13/23 10:24 CST

## 2023-05-22 ENCOUNTER — OFFICE VISIT (OUTPATIENT)
Dept: CARDIOLOGY | Facility: CLINIC | Age: 68
End: 2023-05-22
Payer: MEDICARE

## 2023-05-22 VITALS
WEIGHT: 266 LBS | DIASTOLIC BLOOD PRESSURE: 83 MMHG | HEIGHT: 72 IN | OXYGEN SATURATION: 97 % | SYSTOLIC BLOOD PRESSURE: 151 MMHG | BODY MASS INDEX: 36.03 KG/M2 | HEART RATE: 64 BPM

## 2023-05-22 DIAGNOSIS — Z95.1 PRESENCE OF CORONARY ARTERY BYPASS GRAFT STENT: ICD-10-CM

## 2023-05-22 DIAGNOSIS — Z95.5 STENTED CORONARY ARTERY: ICD-10-CM

## 2023-05-22 DIAGNOSIS — Z95.1 S/P CABG X 2: Primary | ICD-10-CM

## 2023-05-22 DIAGNOSIS — I25.810 CORONARY ARTERY DISEASE INVOLVING CORONARY BYPASS GRAFT OF NATIVE HEART WITHOUT ANGINA PECTORIS: ICD-10-CM

## 2023-05-22 DIAGNOSIS — E78.2 MIXED HYPERLIPIDEMIA: Chronic | ICD-10-CM

## 2023-05-22 DIAGNOSIS — I10 ESSENTIAL HYPERTENSION: Chronic | ICD-10-CM

## 2023-05-22 DIAGNOSIS — Z99.89 OBSTRUCTIVE SLEEP APNEA ON CPAP: Chronic | ICD-10-CM

## 2023-05-22 DIAGNOSIS — E66.01 SEVERE OBESITY (BMI 35.0-39.9) WITH COMORBIDITY: ICD-10-CM

## 2023-05-22 DIAGNOSIS — E66.01 CLASS 2 SEVERE OBESITY DUE TO EXCESS CALORIES WITH SERIOUS COMORBIDITY AND BODY MASS INDEX (BMI) OF 35.0 TO 35.9 IN ADULT: ICD-10-CM

## 2023-05-22 DIAGNOSIS — Z95.5 PRESENCE OF CORONARY ARTERY BYPASS GRAFT STENT: ICD-10-CM

## 2023-05-22 DIAGNOSIS — G47.33 OBSTRUCTIVE SLEEP APNEA ON CPAP: Chronic | ICD-10-CM

## 2023-05-22 PROBLEM — I21.4 NSTEMI (NON-ST ELEVATED MYOCARDIAL INFARCTION): Status: RESOLVED | Noted: 2020-10-03 | Resolved: 2023-05-22

## 2023-05-22 PROBLEM — Z86.0100 HISTORY OF COLON POLYPS: Status: RESOLVED | Noted: 2019-06-04 | Resolved: 2023-05-22

## 2023-05-22 PROBLEM — I20.89 EXERTIONAL ANGINA: Status: RESOLVED | Noted: 2022-03-29 | Resolved: 2023-05-22

## 2023-05-22 PROBLEM — I25.118 CORONARY ARTERY DISEASE WITH EXERTIONAL ANGINA: Status: RESOLVED | Noted: 2022-03-29 | Resolved: 2023-05-22

## 2023-05-22 PROBLEM — Z86.010 HISTORY OF COLON POLYPS: Status: RESOLVED | Noted: 2019-06-04 | Resolved: 2023-05-22

## 2023-05-22 PROBLEM — I20.8 EXERTIONAL ANGINA: Status: RESOLVED | Noted: 2022-03-29 | Resolved: 2023-05-22

## 2023-05-22 RX ORDER — ISOSORBIDE MONONITRATE 120 MG/1
120 TABLET, EXTENDED RELEASE ORAL DAILY
Qty: 180 TABLET | Refills: 3
Start: 2023-05-22

## 2023-05-22 RX ORDER — RAMIPRIL 2.5 MG/1
2.5 CAPSULE ORAL NIGHTLY
Qty: 90 CAPSULE | Refills: 3 | Status: SHIPPED | OUTPATIENT
Start: 2023-05-22

## 2023-05-22 RX ORDER — ATORVASTATIN CALCIUM 80 MG/1
80 TABLET, FILM COATED ORAL DAILY
Qty: 90 TABLET | Refills: 3 | Status: SHIPPED | OUTPATIENT
Start: 2023-05-22

## 2023-05-22 RX ORDER — CARVEDILOL 12.5 MG/1
12.5 TABLET ORAL 2 TIMES DAILY
Qty: 180 TABLET | Refills: 3 | Status: SHIPPED | OUTPATIENT
Start: 2023-05-22

## 2023-05-22 RX ORDER — CLOPIDOGREL BISULFATE 75 MG/1
75 TABLET ORAL DAILY
Qty: 90 TABLET | Refills: 3 | Status: SHIPPED | OUTPATIENT
Start: 2023-05-22

## 2023-05-22 NOTE — PROGRESS NOTES
Lio Velasquez  [id number]  1955  67 y.o.  male    Referring Provider: Bridger Rosales MD    Reason for Follow-up Visit: Here for routine follow up   Prior to last visit seen after dobutamine stress echo  CAD S/P CABG X2 S/P stenting of SVG x2 last 7/16  S/P 2017  left ankle replacement Dr Alanna Dias [facility]  Now with recurrent chest pain, classic angina pectoris  coronary artery disease stented coronary artery   Had  Left circumflex artery percutaneous coronary intervention  With 2 BRIANNA and 2 drug eluting stent to SVG to right posterior descending coronary artery   Had second CABG 10/2021 [facility]   Had coronary stent placed subsequently     Subjective    Overall feels the same   No new events or complaints since last visit   Overall the patient feels no major change from baseline symptoms   Similar symptoms as during last visit       BP well controlled at home.       No bleeding, excessive bruising, gait instability or fall risks    Mild chronic exertional shortness of breath on exertion relieved with rest  No significant cough or wheezing    No palpitations  No significant pedal edema    No fever or chills  No significant expectoration    No hemoptysis  No presyncope or syncope    Tolerating current medications well with no untoward side effects   Compliant with prescribed medication regimen. Tries to adhere to cardiac diet.     Dull exertional chest pain in past and now resolved and able to exert easily   Overall much better and completed cardiac rehab   No new events or complaints since last visit otherwise          History of present illness:  Lio Velasquez is a 67 y.o. yo male with  coronary artery disease Coronary artery bypass grafting stented coronary artery  who presents today for   Chief Complaint   Patient presents with    Coronary Artery Disease     6 mo fu    Hypertension   .    History  Past Medical History:   Diagnosis Date    Aortocoronary bypass status     CAD in  native artery     Chest pain     Coronary angioplasty status     Hyperlipidemia     unspecified    Hypertension, benign     Obesity     Sleep apnea     unspecified   ,   Past Surgical History:   Procedure Laterality Date    ANKLE SURGERY      with screws     CARDIAC CATHETERIZATION Left 03/05/2012 7/2016    CARDIAC CATHETERIZATION Left 08/12/2019    Procedure: Cardiac Catheterization/Vascular Study BRIANNA OK;  Surgeon: Storm Encinas MD;  Location:  [facility] CATH [facility]VAS[facility];  Service: Cardiology    CARDIAC CATHETERIZATION Left 10/04/2020    Procedure: Cardiac Catheterization/Vascular Study 6F Right groin ;  Surgeon: Storm Encinas MD;  Location:  [facility] CATH [facility];  Service: Cardiology;  Laterality: Left;    CARDIAC CATHETERIZATION N/A 04/11/2022    Procedure: Left Heart Cath;  Surgeon: Storm Encinas MD;  Location:  [facility]ATH [facility];  Service: Cardiology;  Laterality: N/A;    CARDIAC CATHETERIZATION N/A 04/27/2022    Procedure: Cardiac Catheterization/Vascular Study  of LCx and possible PCI SVG to RPDA;  Surgeon: Cruz Garcia MD;  Location:  [facility];  Service: Cardiology;  Laterality: N/A;    COLONOSCOPY  12/30/2008    COLONOSCOPY  12/30/2013    two polyps removed from the sigmoid colon    COLONOSCOPY N/A 06/21/2019    Procedure: COLONOSCOPY WITH ANESTHESIA;  Surgeon: Marciano Jaramillo DO;  Location: [facility] ENDOSCOPY;  Service: Gastroenterology    CORONARY ARTERY BYPASS GRAFT  1993    x2    CORONARY ARTERY BYPASS GRAFT  10/17/2021    CORONARY STENT PLACEMENT  2019    x5    JOINT REPLACEMENT Left     left ankle    SHOULDER SURGERY Right     with chano    WRIST SURGERY Left    ,   Family History   Problem Relation Age of Onset    Diabetes Mother         type II    Heart failure Mother         congestive heart failure    Colon polyps Neg Hx     Esophageal cancer Neg Hx     Rectal cancer Neg Hx     Colon cancer Neg Hx    ,   Social History     Tobacco Use    Smoking  status: Never     Passive exposure: Never    Smokeless tobacco: Never   Vaping Use    Vaping Use: Never used   Substance Use Topics    Alcohol use: Not Currently     Comment: occasional    Drug use: No   ,     Medications  Current Outpatient Medications   Medication Sig Dispense Refill    aspirin 81 MG tablet Take 1 tablet by mouth Every Night.      atorvastatin (LIPITOR) 80 MG tablet Take 1 tablet by mouth Daily. 90 tablet 3    carvedilol (COREG) 12.5 MG tablet Take 1 tablet by mouth 2 (Two) Times a Day. 180 tablet 3    clopidogrel (PLAVIX) 75 MG tablet Take 1 tablet by mouth Daily. 90 tablet 3    isosorbide mononitrate (IMDUR) 120 MG 24 hr tablet Take 1 tablet by mouth Daily. 180 tablet 3    Multiple Vitamins-Minerals (CENTRUM CARDIO PO) Take 1 tablet by mouth Every Night.      nitroglycerin (NITROSTAT) 0.4 MG SL tablet Place 1 tablet under the tongue Every 5 (Five) Minutes As Needed for Chest Pain. 1 under the tongue as needed for angina, may repeat q5mins for up three doses      ramipril (ALTACE) 2.5 MG capsule Take 1 capsule by mouth Every Night. 90 capsule 3     No current facility-administered medications for this visit.       Allergies:  Demerol [meperidine] and Morphine and related    Review of Systems  Review of Systems   Constitutional: Positive for malaise/fatigue.   HENT: Negative.     Eyes: Negative.    Cardiovascular:  Positive for dyspnea on exertion. Negative for chest pain, claudication, cyanosis, irregular heartbeat, leg swelling, near-syncope, orthopnea, palpitations, paroxysmal nocturnal dyspnea and syncope.   Respiratory: Negative.     Endocrine: Negative.    Hematologic/Lymphatic: Negative.    Skin: Negative.    Musculoskeletal:  Positive for arthritis, back pain, joint pain and stiffness.   Gastrointestinal:  Negative for anorexia.   Genitourinary: Negative.    Neurological:  Positive for weakness.   Psychiatric/Behavioral: Negative.       Objective     Physical Exam:  /83 (BP Location:  "Left arm, Patient Position: Sitting, Cuff Size: Large Adult)   Pulse 64   Ht 182.9 cm (72.01\")   Wt 121 kg (266 lb)   SpO2 97%   BMI 36.07 kg/m²   Physical Exam   Constitutional: He appears well-developed.   HENT:   Head: Normocephalic.   Neck: Normal carotid pulses and no JVD present. No tracheal tenderness present. Carotid bruit is not present. No tracheal deviation present.   Cardiovascular: Regular rhythm, normal heart sounds and normal pulses.   Pulmonary/Chest: Effort normal. No stridor.   Abdominal: Soft. He exhibits no distension. There is no abdominal tenderness.     Vascular Status -  His right foot exhibits abnormal foot edema. His left foot exhibits abnormal foot edema.  Neurological: He is alert. No cranial nerve deficit or sensory deficit.   Skin: Skin is warm.   Psychiatric: His speech is normal and behavior is normal.      Results Review:    ECG normal sinus rhythm normal ecg rate 64 BPM   Similar to 11/15/2022    Selective Coronary Angiography:  #1.  The left main coronary artery is an average size vessel with no significant disease  #2.  The left anterior descending coronary artery is totally occluded in the mid portion  #3.  The left circumflex coronary artery is an average size vessel with two OM branches. The LCX is totally occluded just distal to the 2nd OM  #4  The right coronary artery is totally occluded in the proximal portion.  #5. The SVG to the RCA is patent but there is a 99% stenosis at the insertion site.      of the LCX  Guide 6Fr XB3.5, guide wire 0.014 PT graphix  Comment: A 0.014 Fighter wire was successfully advanced through the total occlusion using a mamba support catheter and into the true distal lumen. The lesion was pre dilated with a 1.5 mm  balloon, followed by a 2.5 mm balloon at 6 margoth for 30 sec. This was followed by a 2.5 x 18 mm Xience BRIANNA at 15 MARGOTH for 30 sec just distal to the 2nd OM. This was followed by a 2.5 x 16 mm Synergy BRIANNA just proximal to this stent " resulting in no residual stenosis within the stented segment and ROSS 3 flow distally. The 2nd OM had ROSS 1 flow as well as the distal 3rd OM     Stent placement to the RCA via the SVG  Guide 6 Fr RCB guide, guide wire 0.014 PT graphix x2  Comment: The wire was advanced through the stenosis down into the distal PDA. A second graphix was the advanced retrograde up the PDA. The vessel was dilated with a 2.0 balloon over each wire to 6 margoth each. A 2.0 x 8 mm Medtronic BRIANNA was then deployed at 14 margoth for 30 sec into the distal PDA and a similar stent was then deployed in the retrograde PDA in similar fashion. This resulted in no residual stenosis and ROSS 3 flow distally     Conscious Sedation  I supervised the administration of conscious sedation by the nursing staff. The first dose was given at 1121 and the end of the face to face encounter was at 1325. During this time, BP, HR, oximetry, and neurologic status were closely monitored.     Estimated Blood Loss: none  Specimens: None  Complications: None        Conclusion:  #1 status post successful  procedure to the LCX using drug-eluting stents  #2 s/p successful BRIANNA placement x2 to the PDA via the RCA SVG     Final Comment:  Pt tolerated the procedure well. There were no obvious complications. Hemostasis was achieved with a 6F perclose hemostatic device.           Conclusion of cardiac catheterization    4/11/2022        Coronary angiography:     Slight eccentric takeoff of the left main coronary artery  No significant dampening of pressure waveforms  Good reflux of contrast denoting no significant stenosis of ostial left main coronary artery  Ostial left main coronary artery has approximately a 20% stenosis  Occluded proximal left anterior descending coronary artery  No significant disease of the left circumflex coronary artery proximally however mid to distal portion is occluded  With likely faint vessel to vessel collateral with delayed filling of this  vessel  Small ramus intermedius branch has ostial 70% stenosis  Right coronary artery is occluded in the midsegment with vessel to vessel collaterals  There is an occluded saphenous venous graft from prior surgery with multiple stents that is completely occluded and visualized only as the stent remnants  There is a new saphenous venous graft that is patent however at the anastomotic site there is subtotal occlusion with the right posterior descending coronary artery being only half a millimeter in diameter and not suitable for revascularization  Patent left intramammary artery graft to the left anterior descending coronary artery with retrograde flow into a small to moderate long diagonal branch     Left heart cath: LVEDP 28 mm Hg with no gradient across aortic valve on pullback.      LV Gram:Left ventricular ejection fraction of approximately 40-45% with akinesis of the mid and basal inferior wall and hypokinesis of the distal inferior and inferior wall           ____________________________________________________________________________________________________________________________________________     Plan after cardiac catheterization     Considered Lexiscan Cardiolite stress test  Possible additional revascularization including consideration for chronic total occlusion of left circumflex coronary artery versus medical therapy and consideration for EECP  Usual post procedure precautions after arteriotomy including monitoring for signs both local and systemic side effects.  On ultimate discharge will receive printed instructions  Intensive risk factor modifications for both primary and secondary prevention if applicable  Hydration   Observation      Results for orders placed during the hospital encounter of 10/03/20    Adult Transthoracic Echo Complete W/ Cont if Necessary Per Protocol    Interpretation Summary  · Left ventricular ejection fraction appears to be 56 - 60%. Left ventricular systolic function is  normal.  · Normal diastolic function  · No pulmonary hypertension   cath 8/12/19     Conclusion of cardiac catheterization     Severe sequential stenosis of mid and distal saphenous venous graft to right coronary artery treated primary stenting using 3.5 x 38 mm Jamee drug-eluting stent  PTCA of the distal right coronary artery followed by implantation of 3.5 x 23 mm Jamee drug-eluting stent.  The very distal portion of the saphenous venous graft to the right coronary artery has a 50% stenosis.  ROSS-3 flow before and after procedure.  No symptoms.  Filter wire was used.        ____________________________________________________________________________________________________________________________________________     Plan after cardiac catheterization        Discontinue Plavix  Start Brilinta 180 mg now followed by 90 mg p.o. twice daily  Stop Zocor and start Crestor 20 mg daily  Keep LDL well below 70 mg/dL  Given multiple stent placement to saphenous venous graft to the right coronary artery much higher incidence of acute stent thrombosis.  If he has any chest pain to come to the emergency room immediately.     Intensive risk factor modifications for both primary and secondary prevention if applicable  Hydration   Observation       ____________________________________________________________________________________________________________________________________________  Health maintenance and recommendations      Similar recommendations as last visit   Offered to give patient  a copy      Questions were encouraged, asked and answered to the patient's  understanding and satisfaction. Questions if any regarding current medications and side effects, need for refills and importance of compliance to medications stressed.    Reviewed available prior notes, consults, prior visits, laboratory findings, radiology and cardiology relevant reports. Updated chart as applicable. I have reviewed the patient's medical  history in detail and updated the computerized patient record as relevant.      Updated patient regarding any new or relevant abnormalities on review of records or any new findings on physical exam. Mentioned to patient about purpose of visit and desirable health short and long term goals and objectives.    Primary to monitor CBC CMP Lipid panel and TSH as applicable    ___________________________________________________________________________________________________________________________________________        Diagnoses and all orders for this visit:    1. S/P CABG x 2 (Primary)    2. Presence of coronary artery bypass graft stent    3. Obstructive sleep apnea on CPAP    4. Severe obesity (BMI 35.0-39.9) with comorbidity    5. Stented coronary artery     6. Essential hypertension    7. Coronary artery disease involving coronary bypass graft of native heart without angina pectoris    8. Class 2 severe obesity due to excess calories with serious comorbidity and body mass index (BMI) of 35.0 to 35.9 in adult (HCC)    9. Mixed hyperlipidemia    10. BMI 36.0-36.9,adult    Other orders  -     carvedilol (COREG) 12.5 MG tablet; Take 1 tablet by mouth 2 (Two) Times a Day.  Dispense: 180 tablet; Refill: 3  -     atorvastatin (LIPITOR) 80 MG tablet; Take 1 tablet by mouth Daily.  Dispense: 90 tablet; Refill: 3  -     clopidogrel (PLAVIX) 75 MG tablet; Take 1 tablet by mouth Daily.  Dispense: 90 tablet; Refill: 3  -     isosorbide mononitrate (IMDUR) 120 MG 24 hr tablet; Take 1 tablet by mouth Daily.  Dispense: 180 tablet; Refill: 3  -     ramipril (ALTACE) 2.5 MG capsule; Take 1 capsule by mouth Every Night.  Dispense: 90 capsule; Refill: 3           Plan    The current medical regimen is effective;  continue present plan and medications.   Overall doing well no new cardiovascular symptoms and therefore no additional cardiac testing is required prior to next visit  If any interim issues arise will call me for further  evaluation.     Requested Prescriptions     Signed Prescriptions Disp Refills    carvedilol (COREG) 12.5 MG tablet 180 tablet 3     Sig: Take 1 tablet by mouth 2 (Two) Times a Day.    atorvastatin (LIPITOR) 80 MG tablet 90 tablet 3     Sig: Take 1 tablet by mouth Daily.    clopidogrel (PLAVIX) 75 MG tablet 90 tablet 3     Sig: Take 1 tablet by mouth Daily.    isosorbide mononitrate (IMDUR) 120 MG 24 hr tablet 180 tablet 3     Sig: Take 1 tablet by mouth Daily.    ramipril (ALTACE) 2.5 MG capsule 90 capsule 3     Sig: Take 1 capsule by mouth Every Night.      Check BP and heart rates twice daily initially till blood pressures and heart rates under good control and then at least 3x / week,   If blood pressures continue to be well-controlled then can check week a month  at home and bring a recording for review next visit  If BP >130/85 or < 100/60 persistently over 3 reading 30 mins apart or if heart rates persistently above 100 bpm or less than 55 bpm call sooner for evaluation and advise     Complete cardiac rehab     Patient expressed understanding  Encouraged and answered all questions   Discussed with the patient and all questioned fully answered. He will call me if any problems arise.   Discussed results of prior testing with patient : cath as well as ECG from today     Overall doing well no new cardiovascular symptoms and therefore no additional cardiac testing is required   If any interim issues arise will call me for further evaluation.     Keep LDL below 70 mg/dl. Monitor liver and renal functions.   Monitor CBC, CMP, TSH (as indicated) and Lipid Panel by primary     S/L NTG PRN for chest pain, call me or go to ER if has to use S/L nitroglycerin     I support the patient's decision to take the Covid -19 vaccine   Had required complete course   No major issues   Now fully immunized    Recommend booster       Patient was advised to continue CPAP daily.                Return in about 6 months (around  11/22/2023).

## 2023-09-11 LAB
ANION GAP SERPL CALCULATED.3IONS-SCNC: 9 MMOL/L (ref 7–19)
BUN SERPL-MCNC: 11 MG/DL (ref 8–23)
CALCIUM SERPL-MCNC: 9.1 MG/DL (ref 8.8–10.2)
CHLORIDE SERPL-SCNC: 107 MMOL/L (ref 98–111)
CHOLEST SERPL-MCNC: 136 MG/DL (ref 160–199)
CO2 SERPL-SCNC: 26 MMOL/L (ref 22–29)
CREAT SERPL-MCNC: 0.9 MG/DL (ref 0.5–1.2)
GLUCOSE SERPL-MCNC: 162 MG/DL (ref 74–109)
HBA1C MFR BLD: 6.7 % (ref 4–6)
HDLC SERPL-MCNC: 33 MG/DL (ref 55–121)
LDLC SERPL CALC-MCNC: 65 MG/DL
POTASSIUM SERPL-SCNC: 4.3 MMOL/L (ref 3.5–5)
SODIUM SERPL-SCNC: 142 MMOL/L (ref 136–145)
TRIGL SERPL-MCNC: 189 MG/DL (ref 0–149)

## 2023-09-20 RX ORDER — CLOPIDOGREL BISULFATE 75 MG/1
TABLET ORAL
Qty: 90 TABLET | Refills: 3 | Status: SHIPPED | OUTPATIENT
Start: 2023-09-20

## 2023-09-23 RX ORDER — ISOSORBIDE MONONITRATE 120 MG/1
TABLET, EXTENDED RELEASE ORAL
Qty: 180 TABLET | Refills: 2 | Status: SHIPPED | OUTPATIENT
Start: 2023-09-23

## 2023-12-11 LAB
ANION GAP SERPL CALCULATED.3IONS-SCNC: 7 MMOL/L (ref 7–19)
BUN SERPL-MCNC: 10 MG/DL (ref 8–23)
CALCIUM SERPL-MCNC: 9.8 MG/DL (ref 8.8–10.2)
CHLORIDE SERPL-SCNC: 107 MMOL/L (ref 98–111)
CHOLEST SERPL-MCNC: 134 MG/DL (ref 160–199)
CO2 SERPL-SCNC: 29 MMOL/L (ref 22–29)
CREAT SERPL-MCNC: 0.8 MG/DL (ref 0.5–1.2)
GLUCOSE SERPL-MCNC: 165 MG/DL (ref 74–109)
HBA1C MFR BLD: 6.6 % (ref 4–6)
HDLC SERPL-MCNC: 34 MG/DL (ref 55–121)
LDLC SERPL CALC-MCNC: 61 MG/DL
POTASSIUM SERPL-SCNC: 5 MMOL/L (ref 3.5–5)
SODIUM SERPL-SCNC: 143 MMOL/L (ref 136–145)
TRIGL SERPL-MCNC: 195 MG/DL (ref 0–149)

## 2024-01-09 RX ORDER — ATORVASTATIN CALCIUM 80 MG/1
80 TABLET, FILM COATED ORAL DAILY
Qty: 90 TABLET | Refills: 3 | Status: SHIPPED | OUTPATIENT
Start: 2024-01-09

## 2024-01-09 NOTE — TELEPHONE ENCOUNTER
Patients 6 month follow has been rescheduled twice due to /s on call schedule. Patient is requesting a one month refill until patient see's  02-.

## 2024-02-05 ENCOUNTER — OFFICE VISIT (OUTPATIENT)
Dept: CARDIOLOGY | Facility: CLINIC | Age: 69
End: 2024-02-05
Payer: MEDICARE

## 2024-02-05 VITALS
SYSTOLIC BLOOD PRESSURE: 119 MMHG | HEIGHT: 72 IN | WEIGHT: 271 LBS | DIASTOLIC BLOOD PRESSURE: 69 MMHG | BODY MASS INDEX: 36.7 KG/M2 | HEART RATE: 68 BPM

## 2024-02-05 DIAGNOSIS — Z95.1 S/P CABG X 2: Primary | ICD-10-CM

## 2024-02-05 DIAGNOSIS — Z95.5 STENTED CORONARY ARTERY: ICD-10-CM

## 2024-02-05 DIAGNOSIS — Z95.1 PRESENCE OF CORONARY ARTERY BYPASS GRAFT STENT: ICD-10-CM

## 2024-02-05 DIAGNOSIS — Z95.5 PRESENCE OF CORONARY ARTERY BYPASS GRAFT STENT: ICD-10-CM

## 2024-02-05 DIAGNOSIS — E78.2 MIXED HYPERLIPIDEMIA: Chronic | ICD-10-CM

## 2024-02-05 DIAGNOSIS — I10 ESSENTIAL HYPERTENSION: Chronic | ICD-10-CM

## 2024-02-05 DIAGNOSIS — I25.810 CORONARY ARTERY DISEASE INVOLVING CORONARY BYPASS GRAFT OF NATIVE HEART WITHOUT ANGINA PECTORIS: ICD-10-CM

## 2024-02-05 PROBLEM — E66.812 CLASS 2 SEVERE OBESITY DUE TO EXCESS CALORIES WITH SERIOUS COMORBIDITY AND BODY MASS INDEX (BMI) OF 35.0 TO 35.9 IN ADULT: Status: RESOLVED | Noted: 2020-10-20 | Resolved: 2024-02-05

## 2024-02-05 PROBLEM — E66.01 CLASS 2 SEVERE OBESITY DUE TO EXCESS CALORIES WITH SERIOUS COMORBIDITY AND BODY MASS INDEX (BMI) OF 35.0 TO 35.9 IN ADULT: Status: RESOLVED | Noted: 2020-10-20 | Resolved: 2024-02-05

## 2024-02-05 NOTE — PROGRESS NOTES
Lio Velasquez  [id number]  1955  68 y.o.  male    Referring Provider: Bridger Rosales MD    Reason for Follow-up Visit: Here for routine follow up   Prior to last visit seen after dobutamine stress echo  CAD S/P CABG X2 S/P stenting of SVG x2 last 7/16  S/P 2017  left ankle replacement Dr Alanna Dias [facility]  Now with recurrent chest pain, classic angina pectoris  coronary artery disease stented coronary artery   Had  Left circumflex artery percutaneous coronary intervention  With 2 BRIANNA and 2 drug eluting stent to SVG to right posterior descending coronary artery   Had second CABG 10/2021 [facility]   Had coronary stent placed subsequently     Subjective      Good effort tolerance with no cardiovascular limitations and at the patient's baseline   BP well controlled at home. 120s/ 70s mm Hg     BP in clinic as below      Overall feels the same   No new events or complaints since last visit   Overall the patient feels no major change from baseline symptoms   Similar symptoms as during last visit      No bleeding, excessive bruising, gait instability or fall risks    Mild chronic exertional shortness of breath on exertion relieved with rest  No significant cough or wheezing    No palpitations  No significant pedal edema    No fever or chills  No significant expectoration    No hemoptysis  No presyncope or syncope    Tolerating current medications well with no untoward side effects   Compliant with prescribed medication regimen. Tries to adhere to cardiac diet.     Dull exertional chest pain in past and now resolved completely and able to exert easily   Overall much better and completed cardiac rehab   No new events or complaints since last visit otherwise            History of present illness:  Lio Velasquez is a 68 y.o. yo male with  coronary artery disease Coronary artery bypass grafting stented coronary artery  who presents today for   Chief Complaint   Patient presents with    Coronary  Artery Disease     6 month follow up    .    History  Past Medical History:   Diagnosis Date    Aortocoronary bypass status     CAD in native artery     Chest pain     Coronary angioplasty status     Hyperlipidemia     unspecified    Hypertension, benign     Obesity     Sleep apnea     unspecified   ,   Past Surgical History:   Procedure Laterality Date    ANKLE SURGERY      with screws     CARDIAC CATHETERIZATION Left 03/05/2012 7/2016    CARDIAC CATHETERIZATION Left 08/12/2019    Procedure: Cardiac Catheterization/Vascular Study BRIANNA OK;  Surgeon: Storm Encinas MD;  Location:  [facility]AS[facility];  Service: Cardiology    CARDIAC CATHETERIZATION Left 10/04/2020    Procedure: Cardiac Catheterization/Vascular Study 6F Right groin ;  Surgeon: Storm Encinas MD;  Location:  [facility];  Service: Cardiology;  Laterality: Left;    CARDIAC CATHETERIZATION N/A 04/11/2022    Procedure: Left Heart Cath;  Surgeon: Storm Encinas MD;  Location:  [facility];  Service: Cardiology;  Laterality: N/A;    CARDIAC CATHETERIZATION N/A 04/27/2022    Procedure: Cardiac Catheterization/Vascular Study  of LCx and possible PCI SVG to RPDA;  Surgeon: Cruz Garcia MD;  Location:  [facility];  Service: Cardiology;  Laterality: N/A;    COLONOSCOPY  12/30/2008    COLONOSCOPY  12/30/2013    two polyps removed from the sigmoid colon    COLONOSCOPY N/A 06/21/2019    Procedure: COLONOSCOPY WITH ANESTHESIA;  Surgeon: Marciano Jaramillo DO;  Location: [facility] ENDOSCOPY;  Service: Gastroenterology    CORONARY ARTERY BYPASS GRAFT  1993    x2    CORONARY ARTERY BYPASS GRAFT  10/17/2021    CORONARY STENT PLACEMENT  2019    x5    JOINT REPLACEMENT Left     left ankle    SHOULDER SURGERY Right     with chano    WRIST SURGERY Left    ,   Family History   Problem Relation Age of Onset    Diabetes Mother         type II    Heart failure Mother         congestive heart failure    Colon polyps Neg  Hx     Esophageal cancer Neg Hx     Rectal cancer Neg Hx     Colon cancer Neg Hx    ,   Social History     Tobacco Use    Smoking status: Never     Passive exposure: Never    Smokeless tobacco: Never   Vaping Use    Vaping Use: Never used   Substance Use Topics    Alcohol use: Not Currently     Comment: occasional    Drug use: No   ,     Medications  Current Outpatient Medications   Medication Sig Dispense Refill    aspirin 81 MG tablet Take 1 tablet by mouth Every Night.      atorvastatin (LIPITOR) 80 MG tablet Take 1 tablet by mouth Daily. 90 tablet 3    carvedilol (COREG) 12.5 MG tablet Take 1 tablet by mouth 2 (Two) Times a Day. 180 tablet 3    clopidogrel (PLAVIX) 75 MG tablet TAKE 1 TABLET BY MOUTH EVERY DAY 90 tablet 3    isosorbide mononitrate (IMDUR) 120 MG 24 hr tablet TAKE 1 TABLET BY MOUTH 2 (TWO) TIMES A DAY. 180 tablet 2    Multiple Vitamins-Minerals (CENTRUM CARDIO PO) Take 1 tablet by mouth Every Night.      nitroglycerin (NITROSTAT) 0.4 MG SL tablet Place 1 tablet under the tongue Every 5 (Five) Minutes As Needed for Chest Pain. 1 under the tongue as needed for angina, may repeat q5mins for up three doses      ramipril (ALTACE) 2.5 MG capsule Take 1 capsule by mouth Every Night. 90 capsule 3     No current facility-administered medications for this visit.       Allergies:  Demerol [meperidine] and Morphine and related    Review of Systems  Review of Systems   Constitutional: Positive for malaise/fatigue.   HENT: Negative.     Eyes: Negative.    Cardiovascular:  Positive for dyspnea on exertion. Negative for chest pain, claudication, cyanosis, irregular heartbeat, leg swelling, near-syncope, orthopnea, palpitations, paroxysmal nocturnal dyspnea and syncope.   Respiratory: Negative.     Endocrine: Negative.    Hematologic/Lymphatic: Negative.    Skin: Negative.    Musculoskeletal:  Positive for arthritis, back pain, joint pain and stiffness.   Gastrointestinal:  Negative for anorexia.   Genitourinary:  "Negative.    Neurological:  Positive for weakness.   Psychiatric/Behavioral: Negative.         Objective     Physical Exam:  /69   Pulse 68   Ht 182.9 cm (72\")   Wt 123 kg (271 lb)   BMI 36.75 kg/m²   Physical Exam   Constitutional: He appears well-developed.   HENT:   Head: Normocephalic.   Neck: Normal carotid pulses and no JVD present. No tracheal tenderness present. Carotid bruit is not present. No tracheal deviation present.   Cardiovascular: Regular rhythm, normal heart sounds and normal pulses.   Pulmonary/Chest: Effort normal. No stridor.   Abdominal: Soft. He exhibits no distension. There is no abdominal tenderness.     Vascular Status -  His right foot exhibits abnormal foot edema. His left foot exhibits abnormal foot edema.  Neurological: He is alert. No cranial nerve deficit or sensory deficit.   Skin: Skin is warm.   Psychiatric: His speech is normal and behavior is normal.        Results Review:    ECG normal sinus rhythm normal ecg rate 64 BPM   Similar to 11/15/2022    Selective Coronary Angiography:  #1.  The left main coronary artery is an average size vessel with no significant disease  #2.  The left anterior descending coronary artery is totally occluded in the mid portion  #3.  The left circumflex coronary artery is an average size vessel with two OM branches. The LCX is totally occluded just distal to the 2nd OM  #4  The right coronary artery is totally occluded in the proximal portion.  #5. The SVG to the RCA is patent but there is a 99% stenosis at the insertion site.      of the LCX  Guide 6Fr XB3.5, guide wire 0.014 PT graphix  Comment: A 0.014 Fighter wire was successfully advanced through the total occlusion using a mamba support catheter and into the true distal lumen. The lesion was pre dilated with a 1.5 mm  balloon, followed by a 2.5 mm balloon at 6 margoth for 30 sec. This was followed by a 2.5 x 18 mm Xience BRIANNA at 15 MARGOTH for 30 sec just distal to the 2nd OM. This was " followed by a 2.5 x 16 mm Synergy BRIANNA just proximal to this stent resulting in no residual stenosis within the stented segment and ROSS 3 flow distally. The 2nd OM had ROSS 1 flow as well as the distal 3rd OM     Stent placement to the RCA via the SVG  Guide 6 Fr RCB guide, guide wire 0.014 PT graphix x2  Comment: The wire was advanced through the stenosis down into the distal PDA. A second graphix was the advanced retrograde up the PDA. The vessel was dilated with a 2.0 balloon over each wire to 6 margoth each. A 2.0 x 8 mm Medtronic BRIANNA was then deployed at 14 margoth for 30 sec into the distal PDA and a similar stent was then deployed in the retrograde PDA in similar fashion. This resulted in no residual stenosis and ROSS 3 flow distally     Conscious Sedation  I supervised the administration of conscious sedation by the nursing staff. The first dose was given at 1121 and the end of the face to face encounter was at 1325. During this time, BP, HR, oximetry, and neurologic status were closely monitored.     Estimated Blood Loss: none  Specimens: None  Complications: None        Conclusion:  #1 status post successful  procedure to the LCX using drug-eluting stents  #2 s/p successful BRIANNA placement x2 to the PDA via the RCA SVG     Final Comment:  Pt tolerated the procedure well. There were no obvious complications. Hemostasis was achieved with a 6F perclose hemostatic device.           Conclusion of cardiac catheterization    4/11/2022     Coronary angiography:     Slight eccentric takeoff of the left main coronary artery  No significant dampening of pressure waveforms  Good reflux of contrast denoting no significant stenosis of ostial left main coronary artery  Ostial left main coronary artery has approximately a 20% stenosis  Occluded proximal left anterior descending coronary artery  No significant disease of the left circumflex coronary artery proximally however mid to distal portion is occluded  With likely faint  vessel to vessel collateral with delayed filling of this vessel  Small ramus intermedius branch has ostial 70% stenosis  Right coronary artery is occluded in the midsegment with vessel to vessel collaterals  There is an occluded saphenous venous graft from prior surgery with multiple stents that is completely occluded and visualized only as the stent remnants  There is a new saphenous venous graft that is patent however at the anastomotic site there is subtotal occlusion with the right posterior descending coronary artery being only half a millimeter in diameter and not suitable for revascularization  Patent left intramammary artery graft to the left anterior descending coronary artery with retrograde flow into a small to moderate long diagonal branch     Left heart cath: LVEDP 28 mm Hg with no gradient across aortic valve on pullback.      LV Gram:Left ventricular ejection fraction of approximately 40-45% with akinesis of the mid and basal inferior wall and hypokinesis of the distal inferior and inferior wall           ____________________________________________________________________________________________________________________________________________     Plan after cardiac catheterization     Considered Lexiscan Cardiolite stress test  Possible additional revascularization including consideration for chronic total occlusion of left circumflex coronary artery versus medical therapy and consideration for EECP  Usual post procedure precautions after arteriotomy including monitoring for signs both local and systemic side effects.  On ultimate discharge will receive printed instructions  Intensive risk factor modifications for both primary and secondary prevention if applicable  Hydration   Observation      Results for orders placed during the hospital encounter of 10/03/20    Adult Transthoracic Echo Complete W/ Cont if Necessary Per Protocol    Interpretation Summary  · Left ventricular ejection fraction  appears to be 56 - 60%. Left ventricular systolic function is normal.  · Normal diastolic function  · No pulmonary hypertension   cath 8/12/19     Conclusion of cardiac catheterization     Severe sequential stenosis of mid and distal saphenous venous graft to right coronary artery treated primary stenting using 3.5 x 38 mm Jamee drug-eluting stent  PTCA of the distal right coronary artery followed by implantation of 3.5 x 23 mm Jamee drug-eluting stent.  The very distal portion of the saphenous venous graft to the right coronary artery has a 50% stenosis.  ROSS-3 flow before and after procedure.  No symptoms.  Filter wire was used.        ____________________________________________________________________________________________________________________________________________     Plan after cardiac catheterization        Discontinue Plavix  Start Brilinta 180 mg now followed by 90 mg p.o. twice daily  Stop Zocor and start Crestor 20 mg daily  Keep LDL well below 70 mg/dL  Given multiple stent placement to saphenous venous graft to the right coronary artery much higher incidence of acute stent thrombosis.  If he has any chest pain to come to the emergency room immediately.     Intensive risk factor modifications for both primary and secondary prevention if applicable  Hydration   Observation       ____________________________________________________________________________________________________________________________________________  Health maintenance and recommendations      Similar recommendations as last visit   Offered to give patient  a copy      Questions were encouraged, asked and answered to the patient's  understanding and satisfaction. Questions if any regarding current medications and side effects, need for refills and importance of compliance to medications stressed.    Reviewed available prior notes, consults, prior visits, laboratory findings, radiology and cardiology relevant reports. Updated  chart as applicable. I have reviewed the patient's medical history in detail and updated the computerized patient record as relevant.      Updated patient regarding any new or relevant abnormalities on review of records or any new findings on physical exam. Mentioned to patient about purpose of visit and desirable health short and long term goals and objectives.    Primary to monitor CBC CMP Lipid panel and TSH as applicable    ___________________________________________________________________________________________________________________________________________        Diagnoses and all orders for this visit:    1. S/P CABG x 2 (Primary)    2. BMI 36.0-36.9,adult    3. Stented coronary artery     4. Presence of coronary artery bypass graft stent    5. Mixed hyperlipidemia    6. Essential hypertension    7. Coronary artery disease involving coronary bypass graft of native heart without angina pectoris           Plan    The current medical regimen is effective;  continue present plan and medications.   Overall doing well no new cardiovascular symptoms and therefore no additional cardiac testing is required prior to next visit  If any interim issues arise will call me for further evaluation.      Check BP and heart rates twice daily initially till blood pressures and heart rates under good control and then at least 3x / week,   If blood pressures continue to be well-controlled then can check week a month  at home and bring a recording for review next visit  If BP >130/85 or < 100/60 persistently over 3 reading 30 mins apart or if heart rates persistently above 100 bpm or less than 55 bpm call sooner for evaluation and advise     Complete cardiac rehab     Patient expressed understanding  Encouraged and answered all questions   Discussed with the patient and all questioned fully answered. He will call me if any problems arise.   Discussed results of prior testing with patient : cath as well as ECG from today     Keep  LDL below 70 mg/dl. Monitor liver and renal functions.   Monitor CBC, CMP, TSH (as indicated) and Lipid Panel by primary     S/L NTG PRN for chest pain, call me or go to ER if has to use S/L nitroglycerin     I support the patient's decision to take the Covid -19 vaccine   Had required complete course   No major issues   Now fully immunized    Recommend booster       Patient was advised to continue CPAP daily.                Return in about 6 months (around 8/5/2024).

## 2024-04-12 LAB
ALBUMIN SERPL-MCNC: 4.4 G/DL (ref 3.5–5.2)
ALP SERPL-CCNC: 98 U/L (ref 40–130)
ALT SERPL-CCNC: 38 U/L (ref 5–41)
ANION GAP SERPL CALCULATED.3IONS-SCNC: 17 MMOL/L (ref 7–19)
AST SERPL-CCNC: 22 U/L (ref 5–40)
BILIRUB SERPL-MCNC: 0.7 MG/DL (ref 0.2–1.2)
BUN SERPL-MCNC: 12 MG/DL (ref 8–23)
CALCIUM SERPL-MCNC: 9.8 MG/DL (ref 8.8–10.2)
CHLORIDE SERPL-SCNC: 107 MMOL/L (ref 98–111)
CHOLEST SERPL-MCNC: 150 MG/DL (ref 160–199)
CO2 SERPL-SCNC: 22 MMOL/L (ref 22–29)
CREAT SERPL-MCNC: 0.9 MG/DL (ref 0.5–1.2)
GLUCOSE SERPL-MCNC: 179 MG/DL (ref 74–109)
HBA1C MFR BLD: 7.4 % (ref 4–6)
HDLC SERPL-MCNC: 34 MG/DL (ref 55–121)
LDLC SERPL CALC-MCNC: 80 MG/DL
POTASSIUM SERPL-SCNC: 4.7 MMOL/L (ref 3.5–5)
PROT SERPL-MCNC: 7.3 G/DL (ref 6.6–8.7)
SODIUM SERPL-SCNC: 146 MMOL/L (ref 136–145)
TRIGL SERPL-MCNC: 179 MG/DL (ref 0–149)
TSH SERPL DL<=0.005 MIU/L-ACNC: 3.42 UIU/ML (ref 0.27–4.2)

## 2024-06-25 RX ORDER — CLOPIDOGREL BISULFATE 75 MG/1
75 TABLET ORAL DAILY
Qty: 90 TABLET | Refills: 3 | Status: SHIPPED | OUTPATIENT
Start: 2024-06-25

## 2024-07-10 RX ORDER — RAMIPRIL 2.5 MG/1
2.5 CAPSULE ORAL NIGHTLY
Qty: 90 CAPSULE | Refills: 3 | Status: SHIPPED | OUTPATIENT
Start: 2024-07-10

## 2024-08-14 RX ORDER — CARVEDILOL 12.5 MG/1
12.5 TABLET ORAL 2 TIMES DAILY
Qty: 180 TABLET | Refills: 3 | Status: SHIPPED | OUTPATIENT
Start: 2024-08-14

## 2024-09-18 RX ORDER — ISOSORBIDE MONONITRATE 120 MG/1
TABLET, EXTENDED RELEASE ORAL
Qty: 180 TABLET | Refills: 3 | Status: SHIPPED | OUTPATIENT
Start: 2024-09-18

## 2024-09-25 PROBLEM — E66.812 CLASS 2 SEVERE OBESITY DUE TO EXCESS CALORIES WITH SERIOUS COMORBIDITY AND BODY MASS INDEX (BMI) OF 36.0 TO 36.9 IN ADULT: Status: ACTIVE | Noted: 2022-05-13

## 2024-09-26 ENCOUNTER — OFFICE VISIT (OUTPATIENT)
Dept: CARDIOLOGY | Facility: CLINIC | Age: 69
End: 2024-09-26
Payer: MEDICARE

## 2024-09-26 VITALS
BODY MASS INDEX: 36.03 KG/M2 | SYSTOLIC BLOOD PRESSURE: 118 MMHG | OXYGEN SATURATION: 95 % | HEIGHT: 72 IN | DIASTOLIC BLOOD PRESSURE: 76 MMHG | WEIGHT: 266 LBS | HEART RATE: 61 BPM

## 2024-09-26 DIAGNOSIS — Z95.5 STENTED CORONARY ARTERY: ICD-10-CM

## 2024-09-26 DIAGNOSIS — E78.2 MIXED HYPERLIPIDEMIA: Chronic | ICD-10-CM

## 2024-09-26 DIAGNOSIS — I10 ESSENTIAL HYPERTENSION: Chronic | ICD-10-CM

## 2024-09-26 DIAGNOSIS — E66.01 CLASS 2 SEVERE OBESITY DUE TO EXCESS CALORIES WITH SERIOUS COMORBIDITY AND BODY MASS INDEX (BMI) OF 36.0 TO 36.9 IN ADULT: ICD-10-CM

## 2024-09-26 DIAGNOSIS — G47.33 OBSTRUCTIVE SLEEP APNEA ON CPAP: Chronic | ICD-10-CM

## 2024-09-26 DIAGNOSIS — I25.810 CORONARY ARTERY DISEASE INVOLVING CORONARY BYPASS GRAFT OF NATIVE HEART WITHOUT ANGINA PECTORIS: Primary | ICD-10-CM

## 2024-09-26 DIAGNOSIS — Z95.5 PRESENCE OF CORONARY ARTERY BYPASS GRAFT STENT: ICD-10-CM

## 2024-09-26 DIAGNOSIS — Z95.1 PRESENCE OF CORONARY ARTERY BYPASS GRAFT STENT: ICD-10-CM

## 2024-09-26 DIAGNOSIS — Z95.1 S/P CABG X 2: ICD-10-CM

## 2024-09-26 PROCEDURE — 1159F MED LIST DOCD IN RCRD: CPT | Performed by: NURSE PRACTITIONER

## 2024-09-26 PROCEDURE — 93000 ELECTROCARDIOGRAM COMPLETE: CPT | Performed by: NURSE PRACTITIONER

## 2024-09-26 PROCEDURE — 99214 OFFICE O/P EST MOD 30 MIN: CPT | Performed by: NURSE PRACTITIONER

## 2024-09-26 PROCEDURE — 3078F DIAST BP <80 MM HG: CPT | Performed by: NURSE PRACTITIONER

## 2024-09-26 PROCEDURE — 3074F SYST BP LT 130 MM HG: CPT | Performed by: NURSE PRACTITIONER

## 2024-09-26 PROCEDURE — 1160F RVW MEDS BY RX/DR IN RCRD: CPT | Performed by: NURSE PRACTITIONER

## 2024-09-26 RX ORDER — EZETIMIBE 10 MG/1
10 TABLET ORAL DAILY
Qty: 90 TABLET | Refills: 3 | Status: SHIPPED | OUTPATIENT
Start: 2024-09-26

## 2024-09-26 RX ORDER — NITROGLYCERIN 0.4 MG/1
0.4 TABLET SUBLINGUAL
Qty: 25 TABLET | Refills: 3 | Status: SHIPPED | OUTPATIENT
Start: 2024-09-26

## 2024-09-26 RX ORDER — RAMIPRIL 2.5 MG/1
2.5 CAPSULE ORAL NIGHTLY
Qty: 90 CAPSULE | Refills: 3 | Status: SHIPPED | OUTPATIENT
Start: 2024-09-26

## 2024-09-26 RX ORDER — RANOLAZINE 500 MG/1
500 TABLET, EXTENDED RELEASE ORAL 2 TIMES DAILY
Qty: 60 TABLET | Refills: 11 | Status: SHIPPED | OUTPATIENT
Start: 2024-09-26

## 2024-10-01 RX ORDER — ATORVASTATIN CALCIUM 80 MG/1
80 TABLET, FILM COATED ORAL DAILY
Qty: 90 TABLET | Refills: 4 | Status: SHIPPED | OUTPATIENT
Start: 2024-10-01

## 2025-04-01 RX ORDER — CLOPIDOGREL BISULFATE 75 MG/1
75 TABLET ORAL DAILY
Qty: 90 TABLET | Refills: 0 | Status: SHIPPED | OUTPATIENT
Start: 2025-04-01

## 2025-04-28 ENCOUNTER — OFFICE VISIT (OUTPATIENT)
Dept: GASTROENTEROLOGY | Facility: CLINIC | Age: 70
End: 2025-04-28
Payer: MEDICARE

## 2025-04-28 VITALS
BODY MASS INDEX: 33.18 KG/M2 | TEMPERATURE: 97.1 F | HEIGHT: 72 IN | WEIGHT: 245 LBS | DIASTOLIC BLOOD PRESSURE: 80 MMHG | SYSTOLIC BLOOD PRESSURE: 124 MMHG | OXYGEN SATURATION: 98 % | HEART RATE: 84 BPM

## 2025-04-28 DIAGNOSIS — Z79.85 LONG-TERM (CURRENT) USE OF INJECTABLE NON-INSULIN ANTIDIABETIC DRUGS: ICD-10-CM

## 2025-04-28 DIAGNOSIS — Z79.01 ANTICOAGULATED: ICD-10-CM

## 2025-04-28 DIAGNOSIS — Z86.0100 HISTORY OF COLON POLYPS: Primary | ICD-10-CM

## 2025-04-28 RX ORDER — TIRZEPATIDE 5 MG/.5ML
INJECTION, SOLUTION SUBCUTANEOUS
COMMUNITY
Start: 2025-04-09

## 2025-04-28 RX ORDER — SODIUM, POTASSIUM,MAG SULFATES 17.5-3.13G
2 SOLUTION, RECONSTITUTED, ORAL ORAL TAKE AS DIRECTED
Qty: 354 ML | Refills: 0 | Status: SHIPPED | OUTPATIENT
Start: 2025-04-28

## 2025-04-28 NOTE — PROGRESS NOTES
"Chief Complaint   Patient presents with    Colonoscopy     6-21-19 colon 5 year recall       PCP: Drew Lane MD  REFER: No ref. provider found    Subjective     HPI    Lio Velasquez is a 69 y.o. male who presents to office for preventative maintenance.  There is  a personal history of colon polyps.   He does not have complaints of nausea/vomiting, change in bowels, weight loss, no BRBPR, no melena.  There is not a family history of colon cancer in first degree relative.  There is not a family history of colon polyps in first degree relative.  Lio Velasquez last colonoscopy-2019 .  Bowels do move on regular basis.    COLONOSCOPY (06/21/2019 10:13)   SCANNED - COLONOSCOPY (12/30/2013 00:00) polyps  SCANNED - COLONOSCOPY (12/30/2008 00:00)       No results for input(s): \"GLUCOSE\", \"NA\", \"K\", \"CREATININE\", \"BUN\", \"BCR\", \"ALKPHOS\", \"ALT\", \"AST\", \"BILITOT\", \"ALBUMIN\" in the last 04320 hours.    No results for input(s): \"EGFRIFNONA\", \"EGFRIFAFRI\", \"EGFRRESULT\" in the last 77502 hours.     Past Medical History:   Diagnosis Date    Aortocoronary bypass status     CAD in native artery     Chest pain     Coronary angioplasty status     Hyperlipidemia     unspecified    Hypertension, benign     Obesity     Sleep apnea     unspecified     Past Surgical History:   Procedure Laterality Date    ANKLE SURGERY      with screws     CARDIAC CATHETERIZATION Left 03/05/2012 7/2016    CARDIAC CATHETERIZATION Left 08/12/2019    Procedure: Cardiac Catheterization/Vascular Study BRIANNA OK;  Surgeon: Storm Encinas MD;  Location:  [facility] CATH INVASIVE LOC[facility];  Service: Cardiology    CARDIAC CATHETERIZATION Left 10/04/2020    Procedure: Cardiac Catheterization/Vascular Study 6F Right groin ;  Surgeon: Storm Encinas MD;  Location:  [facility] CATH INVASIVE LOC[facility];  Service: Cardiology;  Laterality: Left;    CARDIAC CATHETERIZATION N/A 04/11/2022    Procedure: Left Heart Cath;  Surgeon: Storm Encinas MD;  Location:  [facility] CATH " INVASIVE LOCATION;  Service: Cardiology;  Laterality: N/A;    CARDIAC CATHETERIZATION N/A 04/27/2022    Procedure: Cardiac Catheterization/Vascular Study  of LCx and possible PCI SVG to RPDA;  Surgeon: Cruz Garcia MD;  Location: [facility] CATH INVASIVE LOCATION;  Service: Cardiology;  Laterality: N/A;    COLONOSCOPY  12/30/2008    COLONOSCOPY  12/30/2013    two polyps removed from the sigmoid colon    COLONOSCOPY N/A 06/21/2019    Procedure: COLONOSCOPY WITH ANESTHESIA;  Surgeon: Marciano Jaramillo DO;  Location: [facility] ENDOSCOPY;  Service: Gastroenterology    CORONARY ARTERY BYPASS GRAFT  1993    x2    CORONARY ARTERY BYPASS GRAFT  10/17/2021    CORONARY STENT PLACEMENT  2019    x5    JOINT REPLACEMENT Left     left ankle    SHOULDER SURGERY Right     with chano    WRIST SURGERY Left      Outpatient Medications Marked as Taking for the 4/28/25 encounter (Office Visit) with Gerhard Balbuena APRN   Medication Sig Dispense Refill    aspirin 81 MG tablet Take 1 tablet by mouth Every Night.      atorvastatin (LIPITOR) 80 MG tablet TAKE 1 TABLET BY MOUTH DAILY. 90 tablet 4    carvedilol (COREG) 12.5 MG tablet TAKE 1 TABLET BY MOUTH 2 (TWO) TIMES A DAY. 180 tablet 3    clopidogrel (PLAVIX) 75 MG tablet TAKE 1 TABLET BY MOUTH DAILY. 90 tablet 0    ezetimibe (ZETIA) 10 MG tablet Take 1 tablet by mouth Daily. 90 tablet 3    Mounjaro 5 MG/0.5ML solution auto-injector ADMINISTER 5 MG UNDER THE SKIN EVERY WEEK      Multiple Vitamins-Minerals (CENTRUM CARDIO PO) Take 1 tablet by mouth Every Night.      Nirmatrelvir & Ritonavir, 300mg/100mg, (Paxlovid, 300/100,) Take as directed per package instructions 30 tablet 0    nitroglycerin (NITROSTAT) 0.4 MG SL tablet Place 1 tablet under the tongue Every 5 (Five) Minutes As Needed for Chest Pain. 1 under the tongue as needed for angina, may repeat q5mins for up three doses 25 tablet 3    ramipril (ALTACE) 2.5 MG capsule Take 1 capsule by mouth Every Night. 90 capsule 3     ranolazine (Ranexa) 500 MG 12 hr tablet Take 1 tablet by mouth 2 (Two) Times a Day. 60 tablet 11     Allergies   Allergen Reactions    Demerol [Meperidine] Itching    Morphine And Codeine Itching     Social History     Socioeconomic History    Marital status:    Tobacco Use    Smoking status: Never     Passive exposure: Never    Smokeless tobacco: Never   Vaping Use    Vaping status: Never Used   Substance and Sexual Activity    Alcohol use: Not Currently     Comment: occasional    Drug use: No    Sexual activity: Defer       Review of Systems   Constitutional:  Negative for fever and unexpected weight change.   HENT:  Negative for trouble swallowing.    Respiratory:  Negative for shortness of breath.    Cardiovascular:  Negative for chest pain.   Gastrointestinal:  Negative for abdominal pain and anal bleeding.     Objective   Vitals:    04/28/25 1351   BP: 124/80   Pulse: 84   Temp: 97.1 °F (36.2 °C)   SpO2: 98%     Physical Exam  Constitutional:       Appearance: Normal appearance. He is well-developed.   Eyes:      General: No scleral icterus.  Cardiovascular:      Heart sounds: Normal heart sounds. No murmur heard.  Pulmonary:      Effort: Pulmonary effort is normal.   Abdominal:      General: Bowel sounds are normal. There is no distension.      Palpations: Abdomen is soft.      Tenderness: There is no abdominal tenderness. There is no guarding.   Skin:     General: Skin is warm and dry.      Coloration: Skin is not jaundiced.   Neurological:      Mental Status: He is alert.   Psychiatric:         Behavior: Behavior is cooperative.       Imaging Results (Most Recent)       None          Body mass index is 33.23 kg/m².    Assessment & Plan   Diagnoses and all orders for this visit:    1. History of colon polyps (Primary)  -     Case Request; Standing  -     Implement Anesthesia Orders Day of Procedure; Standing  -     Follow Anesthesia Guidelines / Protocol; Future  -     Case Request    2.  Anticoagulated    3. Long-term (current) use of injectable non-insulin antidiabetic drugs    Other orders  -     sodium-potassium-magnesium sulfates (Suprep Bowel Prep Kit) 17.5-3.13-1.6 GM/177ML solution oral solution; Take 2 bottles by mouth Take As Directed. Take 1 bottle night before and 1 bottle morning of procedure.  Time as directed  Dispense: 354 mL; Refill: 0      COLONOSCOPY WITH ANESTHESIA- (examination of colon) (N/A)    Patient is to continue all blood pressure and cardiac medications prior to procedure and has been advised to take medications morning of procedure   Pt verbalized understanding         All risks, benefits, alternatives, and indications of colonoscopy procedure have been discussed with the patient. Risks to include perforation of the colon requiring possible surgery or colostomy, risk of bleeding from biopsies or removal of colon tissue, possibility of missing a colon polyp or cancer, or adverse drug reaction.  Benefits to include the diagnosis and management of disease of the colon and rectum. Alternatives to include barium enema, radiographic evaluation, lab testing or no intervention. He verbalizes understanding and agrees.       Lio Velasquez asked to hold Tirzepatide (Mournjaro) x 7 days prior to procedure per The American Society of Anesthesia guidelines    Patient is to hold their anticoagulation medication per the direction of their prescribing provider,  Dr Encinas. This is to prevent any risk or complication from bleeding intra and post procedure. If they develop bleeding post procedure they are to go the emergency department for further evaluation and treatment immediately.  We will obtain clearance from prescribing provider requesting Plavix will need to be held x 5 days prior to procedure.     I have explained having an adequate and complete prep is associated with success of colonoscopy.   I have explained to Lio Velasquez that not having an adequate bowel prep can lead  to decreased rate of  adenoma detection, longer procedure times, and higher chance the physician will not be able to successfully complete full colonoscopy (able to intubate cecum).   I have discussed bowel prep options clenpiq, miralax prep, suprep, and Golytley.  Lio Velasquez has elected to proceed with suprep.   I have also discussed that there are a variety of prep options however prep that is prescribed is influenced on patient health history, how well bowels move on regular basis, and individualized insurance plan.  I have explained that we are not able to know the amount of coverage each individual insurance plan provides for preps.  I asked Lio Velasquez to please call our office if we need to send prescription/provide instructions for another prep due to costs/insurance coverage.      Gerhard Balbuena, APRN  04/29/25        There are no Patient Instructions on file for this visit.

## 2025-05-01 ENCOUNTER — OFFICE VISIT (OUTPATIENT)
Dept: CARDIOLOGY | Facility: CLINIC | Age: 70
End: 2025-05-01
Payer: MEDICARE

## 2025-05-01 VITALS
DIASTOLIC BLOOD PRESSURE: 68 MMHG | BODY MASS INDEX: 32.91 KG/M2 | HEART RATE: 70 BPM | SYSTOLIC BLOOD PRESSURE: 108 MMHG | HEIGHT: 72 IN | WEIGHT: 243 LBS | OXYGEN SATURATION: 95 %

## 2025-05-01 DIAGNOSIS — Z95.1 PRESENCE OF CORONARY ARTERY BYPASS GRAFT STENT: ICD-10-CM

## 2025-05-01 DIAGNOSIS — E66.09 CLASS 1 OBESITY DUE TO EXCESS CALORIES WITH SERIOUS COMORBIDITY AND BODY MASS INDEX (BMI) OF 32.0 TO 32.9 IN ADULT: ICD-10-CM

## 2025-05-01 DIAGNOSIS — Z95.5 PRESENCE OF CORONARY ARTERY BYPASS GRAFT STENT: ICD-10-CM

## 2025-05-01 DIAGNOSIS — E78.2 MIXED HYPERLIPIDEMIA: Chronic | ICD-10-CM

## 2025-05-01 DIAGNOSIS — I10 ESSENTIAL HYPERTENSION: Chronic | ICD-10-CM

## 2025-05-01 DIAGNOSIS — G47.33 OBSTRUCTIVE SLEEP APNEA ON CPAP: Chronic | ICD-10-CM

## 2025-05-01 DIAGNOSIS — I25.708 CORONARY ARTERY DISEASE OF BYPASS GRAFT OF NATIVE HEART WITH STABLE ANGINA PECTORIS: Primary | ICD-10-CM

## 2025-05-01 DIAGNOSIS — E66.811 CLASS 1 OBESITY DUE TO EXCESS CALORIES WITH SERIOUS COMORBIDITY AND BODY MASS INDEX (BMI) OF 32.0 TO 32.9 IN ADULT: ICD-10-CM

## 2025-05-01 DIAGNOSIS — Z95.1 S/P CABG X 2: ICD-10-CM

## 2025-05-01 DIAGNOSIS — Z95.5 STENTED CORONARY ARTERY: ICD-10-CM

## 2025-05-01 PROCEDURE — 1160F RVW MEDS BY RX/DR IN RCRD: CPT | Performed by: NURSE PRACTITIONER

## 2025-05-01 PROCEDURE — 99214 OFFICE O/P EST MOD 30 MIN: CPT | Performed by: NURSE PRACTITIONER

## 2025-05-01 PROCEDURE — 93000 ELECTROCARDIOGRAM COMPLETE: CPT | Performed by: NURSE PRACTITIONER

## 2025-05-01 PROCEDURE — 3078F DIAST BP <80 MM HG: CPT | Performed by: NURSE PRACTITIONER

## 2025-05-01 PROCEDURE — 3074F SYST BP LT 130 MM HG: CPT | Performed by: NURSE PRACTITIONER

## 2025-05-01 PROCEDURE — 1159F MED LIST DOCD IN RCRD: CPT | Performed by: NURSE PRACTITIONER

## 2025-05-01 NOTE — PROGRESS NOTES
Subjective:     Encounter Date: 05/01/2025      Patient ID: Lio Velasquez is a 69 y.o. male     HPI: This patient presents today for routine follow-up. He has coronary artery disease status post CABG x2 (LIMA to LAD and SVG to RCA) with subsequent 3.25 x 38 mm stent to the saphenous vein graft to the right coronary artery on 7/8/2016 and 3.5 x 38 mm Jamee drug-eluting stent to the mid saphenous vein graft to the right coronary artery and 3.5 x 23 mm Jamee drug-eluting stent to the distal saphenous vein graft to the right coronary artery 8/12/2019 and redo CABG X1 (SVG to RCA) 10/22 at [place] with subsequent 2.0 X 8 mm Medtronic BRIANNA X2 to PDA via SVG to RCA and 2.5 X 18 mm Xience BRIANNA and 2.5 X 16 mm Synergy BRIANNA to LCX for  4/27/22, hypertension, hyperlipidemia, obstructive sleep apnea and obesity. He continues to report dizziness with position changes. Patient denies chest pain, shortness of breath, palpitations, syncope, orthopnea, PND, edema or decreased stamina.  Patient denies any signs of bleeding.    Chief Complaint: Routine follow-up  Coronary Artery Disease  Presents for follow-up visit. Symptoms include dizziness. Pertinent negatives include no chest pain, chest pressure, chest tightness, leg swelling, muscle weakness, palpitations, shortness of breath or weight gain. Risk factors include hyperlipidemia and obesity. The symptoms have been stable. Compliance with diet is variable. Compliance with exercise is variable. Compliance with medications is good.   Hypertension  This is a chronic problem. The current episode started more than 1 year ago. The problem is controlled. Associated symptoms include headaches. Pertinent negatives include no anxiety, blurred vision, chest pain, malaise/fatigue, neck pain, orthopnea, palpitations, peripheral edema, PND, shortness of breath or sweats. Risk factors for coronary artery disease include male gender, obesity and dyslipidemia. Current antihypertension  treatment includes beta blockers and ACE inhibitors. The current treatment provides significant improvement. Hypertensive end-organ damage includes CAD/MI.   Hyperlipidemia  This is a chronic problem. The current episode started more than 1 year ago. Exacerbating diseases include obesity. Pertinent negatives include no chest pain or shortness of breath. Current antihyperlipidemic treatment includes statins. Risk factors for coronary artery disease include hypertension, male sex, obesity and dyslipidemia.     I have reviewed and confirmed the accuracy of the HPI RODGER Orozco    Previous Cardiac Testing:  Results for orders placed during the hospital encounter of 10/03/20    Adult Transthoracic Echo Complete W/ Cont if Necessary Per Protocol    Interpretation Summary  · Left ventricular ejection fraction appears to be 56 - 60%. Left ventricular systolic function is normal.  · Normal diastolic function  · No pulmonary hypertension    Results for orders placed during the hospital encounter of 10/03/20   Cardiac Catheterization/Vascular Study    Narrative · Mid LAD lesion is 100% stenosed.  · Prox RCA lesion is 100% stenosed. Vessel two-vessel collaterals noted   from proximal to distal right coronary arteryAlso collaterals noted to   obtuse marginal branch  · Origin to Prox Graft lesion is 100% stenosed.  · Mild systolic dysfunction.  · The ejection fraction was 40-50% by visual estimate.  · No mitral valve regurgitation.  · No aortic valve stenosis.  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    Allergies   Allergen Reactions    Demerol [Meperidine]  Itching    Morphine And Codeine Itching       Current Outpatient Medications:     aspirin 81 MG tablet, Take 1 tablet by mouth Every Night., Disp: , Rfl:     atorvastatin (LIPITOR) 80 MG tablet, TAKE 1 TABLET BY MOUTH DAILY., Disp: 90 tablet, Rfl: 4    carvedilol (COREG) 12.5 MG tablet, TAKE 1 TABLET BY MOUTH 2 (TWO) TIMES A DAY., Disp: 180 tablet, Rfl: 3    clopidogrel (PLAVIX) 75 MG tablet, TAKE 1 TABLET BY MOUTH DAILY., Disp: 90 tablet, Rfl: 0    ezetimibe (ZETIA) 10 MG tablet, Take 1 tablet by mouth Daily., Disp: 90 tablet, Rfl: 3    Mounjaro 5 MG/0.5ML solution auto-injector, ADMINISTER 5 MG UNDER THE SKIN EVERY WEEK, Disp: , Rfl:     Multiple Vitamins-Minerals (CENTRUM CARDIO PO), Take 1 tablet by mouth Every Night., Disp: , Rfl:     Nirmatrelvir & Ritonavir, 300mg/100mg, (Paxlovid, 300/100,), Take as directed per package instructions, Disp: 30 tablet, Rfl: 0    nitroglycerin (NITROSTAT) 0.4 MG SL tablet, Place 1 tablet under the tongue Every 5 (Five) Minutes As Needed for Chest Pain. 1 under the tongue as needed for angina, may repeat q5mins for up three doses, Disp: 25 tablet, Rfl: 3    ramipril (ALTACE) 2.5 MG capsule, Take 1 capsule by mouth Every Night., Disp: 90 capsule, Rfl: 3    ranolazine (Ranexa) 500 MG 12 hr tablet, Take 1 tablet by mouth 2 (Two) Times a Day., Disp: 60 tablet, Rfl: 11    sodium-potassium-magnesium sulfates (Suprep Bowel Prep Kit) 17.5-3.13-1.6 GM/177ML solution oral solution, Take 2 bottles by mouth Take As Directed. Take 1 bottle night before and 1 bottle morning of procedure.  Time as directed, Disp: 354 mL, Rfl: 0  Past Medical History:   Diagnosis Date    Aortocoronary bypass status     CAD in native artery     Chest pain     Coronary angioplasty status     Hyperlipidemia     unspecified    Hypertension, benign     Obesity     Sleep apnea     unspecified     Past Surgical History:   Procedure Laterality Date    ANKLE SURGERY      with screws     CARDIAC CATHETERIZATION Left  03/05/2012 7/2016    CARDIAC CATHETERIZATION Left 08/12/2019    Procedure: Cardiac Catheterization/Vascular Study BRIANNA OK;  Surgeon: Storm Encinas MD;  Location:  [facility] CATH INVASIVE LOC[facility];  Service: Cardiology    CARDIAC CATHETERIZATION Left 10/04/2020    Procedure: Cardiac Catheterization/Vascular Study 6F Right groin ;  Surgeon: Storm Encinas MD;  Location:  [facility] CATH INV[facility];  Service: Cardiology;  Laterality: Left;    CARDIAC CATHETERIZATION N/A 04/11/2022    Procedure: Left Heart Cath;  Surgeon: Storm Encinas MD;  Location:  [facility] CATH INVAS[facility];  Service: Cardiology;  Laterality: N/A;    CARDIAC CATHETERIZATION N/A 04/27/2022    Procedure: Cardiac Catheterization/Vascular Study  of LCx and possible PCI SVG to RPDA;  Surgeon: Cruz Garcia MD;  Location: [facility] CATH INVASIVE LOCATION;  Service: Cardiology;  Laterality: N/A;    COLONOSCOPY  12/30/2008    COLONOSCOPY  12/30/2013    two polyps removed from the sigmoid colon    COLONOSCOPY N/A 06/21/2019    Procedure: COLONOSCOPY WITH ANESTHESIA;  Surgeon: Marciano Jaramillo DO;  Location: [facility] ENDOSCOPY;  Service: Gastroenterology    CORONARY ARTERY BYPASS GRAFT  1993    x2    CORONARY ARTERY BYPASS GRAFT  10/17/2021    CORONARY STENT PLACEMENT  2019    x5    JOINT REPLACEMENT Left     left ankle    SHOULDER SURGERY Right     with chano    WRIST SURGERY Left      Family History   Problem Relation Age of Onset    Diabetes Mother         type II    Heart failure Mother         congestive heart failure    Colon polyps Neg Hx     Esophageal cancer Neg Hx     Rectal cancer Neg Hx     Colon cancer Neg Hx      Social History     Socioeconomic History    Marital status:    Tobacco Use    Smoking status: Never     Passive exposure: Never    Smokeless tobacco: Never   Vaping Use    Vaping status: Never Used   Substance and Sexual Activity    Alcohol use: Not Currently     Comment: occasional    Drug use: No    Sexual activity: Defer        Review of Systems   Constitutional: Negative for chills, decreased appetite, fever, malaise/fatigue, night sweats, weight gain and weight loss.   HENT:  Negative for nosebleeds.    Eyes:  Negative for blurred vision and visual disturbance.   Cardiovascular:  Negative for chest pain, dyspnea on exertion, leg swelling, near-syncope, orthopnea, palpitations, paroxysmal nocturnal dyspnea and syncope.   Respiratory:  Negative for chest tightness, cough, hemoptysis, shortness of breath, snoring and wheezing.    Endocrine: Negative for cold intolerance, heat intolerance and polydipsia.   Hematologic/Lymphatic: Does not bruise/bleed easily.   Skin:  Negative for rash.   Musculoskeletal:  Negative for back pain, falls, muscle weakness and neck pain.   Gastrointestinal:  Negative for abdominal pain, change in bowel habit, constipation, diarrhea, dysphagia, flatus, heartburn, nausea and vomiting.   Genitourinary:  Negative for hematuria.   Neurological:  Positive for dizziness and headaches. Negative for light-headedness and weakness.   Psychiatric/Behavioral:  Negative for altered mental status.    Allergic/Immunologic: Negative for persistent infections.     I have reviewed and confirmed the accuracy of the ROS RODGER Orozco       Objective:     Vitals and nursing note reviewed.   Constitutional:       General: Awake.      Appearance: Normal and healthy appearance. Well-developed and not in distress. Obese.   Eyes:      General: Lids are normal.      Conjunctiva/sclera: Conjunctivae normal.      Pupils: Pupils are equal, round, and reactive to light.   HENT:      Head: Normocephalic and atraumatic.      Nose: Nose normal.   Neck:      Vascular: No JVR. JVD normal.   Pulmonary:      Effort: Pulmonary effort is normal.      Breath sounds: Normal breath sounds. No wheezing. No rhonchi. No rales.   Chest:      Chest wall: Not tender to palpatation.   Cardiovascular:      PMI at left midclavicular line. Normal  "rate. Regular rhythm. Normal S1. Normal S2.       Murmurs: There is no murmur.      No gallop.  No click. No rub.   Pulses:     Intact distal pulses.   Edema:     Peripheral edema absent.   Abdominal:      General: Bowel sounds are normal.      Palpations: Abdomen is soft.      Tenderness: There is no abdominal tenderness.   Musculoskeletal: Normal range of motion.         General: No tenderness.      Cervical back: Normal range of motion. Skin:     General: Skin is warm and dry.   Neurological:      General: No focal deficit present.      Mental Status: Alert, oriented to person, place, and time and oriented to person, place and time.   Psychiatric:         Attention and Perception: Attention and perception normal.         Mood and Affect: Mood and affect normal.         Speech: Speech normal.         Behavior: Behavior normal. Behavior is cooperative.         Thought Content: Thought content normal.         Cognition and Memory: Cognition and memory normal.         Judgment: Judgment normal.             ECG 12 Lead    Date/Time: 5/1/2025 3:03 PM  Performed by: Fatmata Cano APRN    Authorized by: Fatmata Cano APRN  Comparison: compared with previous ECG from 9/26/2024  Similar to previous ECG  Rhythm: sinus rhythm  Rate: normal  BPM: 70  T inversion: III and aVF  QRS axis: normal    Clinical impression: abnormal EKG        /68   Pulse 70   Ht 182.9 cm (72\")   Wt 110 kg (243 lb)   SpO2 95%   BMI 32.96 kg/m²   Lab Review:   Lab Results   Component Value Date    WBC 7.07 04/28/2022    HGB 11.9 (L) 04/28/2022    HCT 35.6 (L) 04/28/2022    HCT 35.1 (L) 04/28/2022    MCV 92.9 04/28/2022     04/28/2022     04/28/2022     Lab Results   Component Value Date    GLUCOSE 122 (H) 04/28/2022    BUN 11 04/28/2022    CREATININE 0.93 04/28/2022    EGFRIFNONA 73 02/08/2021    EGFRIFAFRI 84 02/08/2021    BCR 11.8 04/28/2022    K 3.7 04/28/2022    CO2 23.0 04/28/2022    CALCIUM 9.2 04/28/2022    " ALBUMIN 4.20 04/27/2022    AST 17 04/27/2022    ALT 17 04/27/2022     Lab Results   Component Value Date    CHOL 119 10/04/2020    CHOL 129 (L) 08/13/2019    CHLPL 150 (L) 04/12/2024    CHLPL 134 (L) 12/11/2023    CHLPL 136 (L) 09/11/2023     Lab Results   Component Value Date    TRIG 179 (H) 04/12/2024    TRIG 195 (H) 12/11/2023    TRIG 189 (H) 09/11/2023     Lab Results   Component Value Date    HDL 34 (L) 04/12/2024    HDL 34 (L) 12/11/2023    HDL 33 (L) 09/11/2023     Lab Results   Component Value Date    LDL 80 04/12/2024    LDL 61 12/11/2023    LDL 65 09/11/2023       I have reviewed the most recent lab results.       Assessment:          Diagnosis Plan   1. Coronary artery disease of bypass graft of native heart with stable angina pectoris (CMS/HCC)   stable. Continue Ranexa.    2. S/P CABG x 2  LIMA to LAD and SVG to RCA (occluded) 1993 with redo CABG 10/22 at [place].   3. Stented coronary artery   Multiple in past as stated above with most recent being 2.5 X 18 mm Xience BRIANNA and 2.5 X 16 mm Synergy BRIANNA to LCX for  4/27/22. Continues on aspirin and Plavix. Denies bleeding.    4. Presence of coronary artery bypass graft stent  SVG to RCA X4 most recent 2.0 X 8 mm Medtronic BRIANNA X2 to PDA via SVG to RCA    5. Essential hypertension  Blood pressure is well-controlled. Continue carvedilol and ramipril.    6. Mixed hyperlipidemia  Managed by PCP. On statin.  LDL within goal range at 47 on lipid panel 3/19/25. Continue atorvastatin and zetia.     7. Obstructive sleep apnea on CPAP  Compliant with CPAP.  Stable.   8. Class 1 obesity due to excess calories with serious comorbidity and body mass index (BMI) of 32.0 to 32.9 in adult  Patient's Body mass index is 32.96 kg/m². BMI is above normal parameters. Recommendations include: educational material.            Plan:         1.  Continue medications as previously prescribed.  2. Report any worsening symptoms.  3. Report any signs of bleeding.  4. Continue  heart healthy diet and regular exercise as tolerated.   5. Follow up with PCP for blood pressure and cholesterol management and routine lab work.  6. Follow up in 6 months, or sooner if needed.   7.  Monitor and record daily blood pressure. Report readings consistently higher than 130/80 or consistently lower than 100/60.

## 2025-05-05 RX ORDER — CARVEDILOL 12.5 MG/1
12.5 TABLET ORAL 2 TIMES DAILY
Qty: 180 TABLET | Refills: 3 | Status: SHIPPED | OUTPATIENT
Start: 2025-05-05

## 2025-05-06 ENCOUNTER — TELEPHONE (OUTPATIENT)
Dept: CARDIOLOGY | Facility: CLINIC | Age: 70
End: 2025-05-06
Payer: MEDICARE

## 2025-05-06 RX ORDER — EZETIMIBE 10 MG/1
10 TABLET ORAL DAILY
Qty: 90 TABLET | Refills: 3 | Status: SHIPPED | OUTPATIENT
Start: 2025-05-06

## 2025-05-06 RX ORDER — CLOPIDOGREL BISULFATE 75 MG/1
75 TABLET ORAL DAILY
Qty: 90 TABLET | Refills: 3 | Status: SHIPPED | OUTPATIENT
Start: 2025-05-06

## 2025-05-06 RX ORDER — ATORVASTATIN CALCIUM 80 MG/1
80 TABLET, FILM COATED ORAL DAILY
Qty: 90 TABLET | Refills: 3 | Status: SHIPPED | OUTPATIENT
Start: 2025-05-06

## 2025-05-06 RX ORDER — RANOLAZINE 500 MG/1
500 TABLET, EXTENDED RELEASE ORAL 2 TIMES DAILY
Qty: 180 TABLET | Refills: 3 | Status: SHIPPED | OUTPATIENT
Start: 2025-05-06

## 2025-05-06 RX ORDER — RAMIPRIL 2.5 MG/1
2.5 CAPSULE ORAL NIGHTLY
Qty: 90 CAPSULE | Refills: 3 | Status: SHIPPED | OUTPATIENT
Start: 2025-05-06

## 2025-06-11 ENCOUNTER — TELEPHONE (OUTPATIENT)
Dept: GASTROENTEROLOGY | Facility: CLINIC | Age: 70
End: 2025-06-11
Payer: MEDICARE

## 2025-06-11 NOTE — TELEPHONE ENCOUNTER
----- Message from Fatmata Cano sent at 6/11/2025 10:49 AM CDT -----  It is acceptable for this patient to hold Plavix for >5 days prior to surgery/invasive procedure per  recommendations, as this patient has not had a coronary artery stent placed within the last 12 months. Resume Plavix as soon as possible. Continue aspirin 81 mg daily without interruption. No need for lovenox bridge.  ----- Message -----  From: Behzad Gloria MA  Sent: 6/11/2025   7:27 AM CDT  To: Fatmata Cano, RODGER

## 2025-06-18 ENCOUNTER — ANESTHESIA (OUTPATIENT)
Dept: GASTROENTEROLOGY | Facility: HOSPITAL | Age: 70
End: 2025-06-18
Payer: MEDICARE

## 2025-06-18 ENCOUNTER — HOSPITAL ENCOUNTER (OUTPATIENT)
Facility: HOSPITAL | Age: 70
Setting detail: HOSPITAL OUTPATIENT SURGERY
Discharge: HOME | End: 2025-06-18
Attending: INTERNAL MEDICINE | Admitting: INTERNAL MEDICINE
Payer: MEDICARE

## 2025-06-18 ENCOUNTER — ANESTHESIA EVENT (OUTPATIENT)
Dept: GASTROENTEROLOGY | Facility: HOSPITAL | Age: 70
End: 2025-06-18
Payer: MEDICARE

## 2025-06-18 VITALS
WEIGHT: 232 LBS | HEART RATE: 68 BPM | RESPIRATION RATE: 13 BRPM | DIASTOLIC BLOOD PRESSURE: 80 MMHG | HEIGHT: 72 IN | SYSTOLIC BLOOD PRESSURE: 119 MMHG | OXYGEN SATURATION: 98 % | BODY MASS INDEX: 31.42 KG/M2 | TEMPERATURE: 96 F

## 2025-06-18 DIAGNOSIS — Z86.0100 HISTORY OF COLON POLYPS: ICD-10-CM

## 2025-06-18 PROCEDURE — 25010000002 LIDOCAINE PF 2% 2 % SOLUTION: Performed by: NURSE ANESTHETIST, CERTIFIED REGISTERED

## 2025-06-18 PROCEDURE — G0105 COLORECTAL SCRN; HI RISK IND: HCPCS | Performed by: INTERNAL MEDICINE

## 2025-06-18 PROCEDURE — 25010000002 PROPOFOL 10 MG/ML EMULSION: Performed by: NURSE ANESTHETIST, CERTIFIED REGISTERED

## 2025-06-18 PROCEDURE — 25810000003 SODIUM CHLORIDE 0.9 % SOLUTION: Performed by: ANESTHESIOLOGY

## 2025-06-18 RX ORDER — PROPOFOL 10 MG/ML
VIAL (ML) INTRAVENOUS AS NEEDED
Status: DISCONTINUED | OUTPATIENT
Start: 2025-06-18 | End: 2025-06-18 | Stop reason: SURG

## 2025-06-18 RX ORDER — SODIUM CHLORIDE 0.9 % (FLUSH) 0.9 %
10 SYRINGE (ML) INJECTION AS NEEDED
Status: DISCONTINUED | OUTPATIENT
Start: 2025-06-18 | End: 2025-06-18 | Stop reason: HOSPADM

## 2025-06-18 RX ORDER — LIDOCAINE HYDROCHLORIDE 20 MG/ML
INJECTION, SOLUTION EPIDURAL; INFILTRATION; INTRACAUDAL; PERINEURAL AS NEEDED
Status: DISCONTINUED | OUTPATIENT
Start: 2025-06-18 | End: 2025-06-18 | Stop reason: SURG

## 2025-06-18 RX ORDER — SODIUM CHLORIDE 9 MG/ML
500 INJECTION, SOLUTION INTRAVENOUS CONTINUOUS PRN
Status: DISCONTINUED | OUTPATIENT
Start: 2025-06-18 | End: 2025-06-18 | Stop reason: HOSPADM

## 2025-06-18 RX ORDER — LIDOCAINE HYDROCHLORIDE 10 MG/ML
0.5 INJECTION, SOLUTION EPIDURAL; INFILTRATION; INTRACAUDAL; PERINEURAL ONCE AS NEEDED
Status: DISCONTINUED | OUTPATIENT
Start: 2025-06-18 | End: 2025-06-18 | Stop reason: HOSPADM

## 2025-06-18 RX ADMIN — PROPOFOL 150 MG: 10 INJECTION, EMULSION INTRAVENOUS at 10:47

## 2025-06-18 RX ADMIN — LIDOCAINE HYDROCHLORIDE 100 MG: 20 INJECTION, SOLUTION EPIDURAL; INFILTRATION; INTRACAUDAL; PERINEURAL at 10:47

## 2025-06-18 RX ADMIN — SODIUM CHLORIDE 500 ML: 9 INJECTION, SOLUTION INTRAVENOUS at 09:47

## 2025-06-18 NOTE — H&P
[facility]  Pre Procedure History & Physical    Chief Complaint:   Polyps    Subjective     HPI:   Polyps    Past Medical History:   Past Medical History:   Diagnosis Date    Aortocoronary bypass status     CAD in native artery     Chest pain     Coronary angioplasty status     Diabetes mellitus     Hyperlipidemia     unspecified    Hypertension, benign     Obesity     Sleep apnea     cpap at hs       Past Surgical History:  Past Surgical History:   Procedure Laterality Date    ANKLE SURGERY      with screws     CARDIAC CATHETERIZATION Left 03/05/2012 7/2016    CARDIAC CATHETERIZATION Left 08/12/2019    Procedure: Cardiac Catheterization/Vascular Study BRIANNA OK;  Surgeon: Storm Encinas MD;  Location:  [facility];  Service: Cardiology    CARDIAC CATHETERIZATION Left 10/04/2020    Procedure: Cardiac Catheterization/Vascular Study 6F Right groin ;  Surgeon: Storm Encinas MD;  Location:  [facility];  Service: Cardiology;  Laterality: Left;    CARDIAC CATHETERIZATION N/A 04/11/2022    Procedure: Left Heart Cath;  Surgeon: Storm Encinas MD;  Location:  [facility];  Service: Cardiology;  Laterality: N/A;    CARDIAC CATHETERIZATION N/A 04/27/2022    Procedure: Cardiac Catheterization/Vascular Study  of LCx and possible PCI SVG to RPDA;  Surgeon: Cruz Garcia MD;  Location:  [facility];  Service: Cardiology;  Laterality: N/A;    COLONOSCOPY  12/30/2008    COLONOSCOPY  12/30/2013    two polyps removed from the sigmoid colon    COLONOSCOPY N/A 06/21/2019    Procedure: COLONOSCOPY WITH ANESTHESIA;  Surgeon: Marciano Jaramillo DO;  Location: [facility] ENDOSCOPY;  Service: Gastroenterology    CORONARY ARTERY BYPASS GRAFT  1993    x2    CORONARY ARTERY BYPASS GRAFT  10/17/2021    CORONARY STENT PLACEMENT  2019    x5    JOINT REPLACEMENT Left     left ankle    SHOULDER SURGERY Right     with chano    WRIST SURGERY Left        Family  History:  Family History   Problem Relation Age of Onset    Diabetes Mother         type II    Heart failure Mother         congestive heart failure    Colon polyps Neg Hx     Esophageal cancer Neg Hx     Rectal cancer Neg Hx     Colon cancer Neg Hx        Social History:   reports that he has never smoked. He has never been exposed to tobacco smoke. He has never used smokeless tobacco. He reports that he does not currently use alcohol. He reports that he does not use drugs.    Medications:   Prior to Admission medications    Medication Sig Start Date End Date Taking? Authorizing Provider   aspirin 81 MG tablet Take 1 tablet by mouth Every Night.   Yes Sidney Manning MD   atorvastatin (LIPITOR) 80 MG tablet Take 1 tablet by mouth Daily. 5/6/25  Yes Fatmata Cano APRN   carvedilol (COREG) 12.5 MG tablet TAKE 1 TABLET BY MOUTH 2 (TWO) TIMES A DAY. 5/5/25  Yes Fatmata Cano APRN   ezetimibe (ZETIA) 10 MG tablet Take 1 tablet by mouth Daily. 5/6/25  Yes Fatmata Cano APRN   Multiple Vitamins-Minerals (CENTRUM CARDIO PO) Take 1 tablet by mouth Every Night.   Yes Sidney Manning MD   ramipril (ALTACE) 2.5 MG capsule Take 1 capsule by mouth Every Night. 5/6/25  Yes Fatmata Cano APRN   ranolazine (Ranexa) 500 MG 12 hr tablet Take 1 tablet by mouth 2 (Two) Times a Day. 5/6/25  Yes Fatmata Cano APRN   sodium-potassium-magnesium sulfates (Suprep Bowel Prep Kit) 17.5-3.13-1.6 GM/177ML solution oral solution Take 2 bottles by mouth Take As Directed. Take 1 bottle night before and 1 bottle morning of procedure.  Time as directed 4/28/25  Yes Gerhard Balbuena APRN   clopidogrel (PLAVIX) 75 MG tablet Take 1 tablet by mouth Daily. 5/6/25   Fatmata Cano APRN   Mounjaro 5 MG/0.5ML solution auto-injector ADMINISTER 5 MG UNDER THE SKIN EVERY WEEK 4/9/25   ProviderSidney MD   Nirmatrelvir & Ritonavir, 300mg/100mg, (Paxlovid, 300/100,) Take as directed per package  "instructions 10/17/24      nitroglycerin (NITROSTAT) 0.4 MG SL tablet Place 1 tablet under the tongue Every 5 (Five) Minutes As Needed for Chest Pain. 1 under the tongue as needed for angina, may repeat q5mins for up three doses 9/26/24   Fatmata Cano APRN       Allergies:  Demerol [meperidine] and Morphine and codeine    ROS:    General: Weight stable  Resp: No SOA  Cardiovascular: No CP    Objective     Blood pressure 136/80, pulse 71, temperature 96 °F (35.6 °C), temperature source Temporal, resp. rate 18, height 182.9 cm (72\"), weight 105 kg (232 lb), SpO2 96%.    Physical Exam   Constitutional: Pt is oriented to person, place, and in no distress.   HENT: Mouth/Throat: Oropharynx is clear.   Cardiovascular: Normal rate, regular rhythm.    Pulmonary/Chest: Effort normal. No respiratory distress. No  wheezes.   Abdominal: Soft. Non-distended.  Skin: Skin is warm and dry.   Psychiatric: Mood, memory, affect and judgment appear normal.     Assessment & Plan     Diagnosis:  Polyps    Anticipated Surgical Procedure:  C-scope    The risks, benefits, and alternatives of this procedure have been discussed with the patient or the responsible party- the patient understands and agrees to proceed.        "

## 2025-06-18 NOTE — ANESTHESIA PREPROCEDURE EVALUATION
Anesthesia Evaluation     Patient summary reviewed   no history of anesthetic complications:   NPO Solid Status: > 8 hours  NPO Liquid Status: > 4 hours           Airway   Mallampati: II  TM distance: >3 FB  Neck ROM: full  No difficulty expected  Dental - normal exam     Pulmonary    (+) ,sleep apnea on CPAP  (-) asthma, not a smoker  Cardiovascular   Exercise tolerance: good (4-7 METS)    ECG reviewed  PT is on anticoagulation therapy  Patient on routine beta blocker and Beta blocker given within 24 hours of surgery    (+) hypertension, CAD, CABG, cardiac stents , hyperlipidemia  (-) angina    ROS comment: Last plavix x5 days    Neuro/Psych  (-) seizures, TIA, CVA  GI/Hepatic/Renal/Endo    (+) obesity  (-) liver disease, no renal disease, diabetes    Musculoskeletal     Abdominal    Substance History      OB/GYN          Other                          Anesthesia Plan    ASA 3     MAC     (Cards f/u 5/2025 --ok to proceed. Aspirin yesterday, appropriately )  intravenous induction     Anesthetic plan, risks, benefits, and alternatives have been provided, discussed and informed consent has been obtained with: patient.

## 2025-06-18 NOTE — ANESTHESIA POSTPROCEDURE EVALUATION
"Patient: Lio Velasquez    Procedure Summary       Date: 06/18/25 Room / Location: [facility] ENDOSCOPY 5 / [facility]    Anesthesia Start: 1043 Anesthesia Stop: 1101    Procedure: COLONOSCOPY WITH ANESTHESIA- (examination of colon) Diagnosis:       History of colon polyps      (History of colon polyps [Z86.0100])    Surgeons: Marciano Jaramillo DO Provider: Janette Garces CRNA    Anesthesia Type: MAC ASA Status: 3            Anesthesia Type: MAC    Vitals  Vitals Value Taken Time   /82 06/18/25 11:15   Temp     Pulse 73 06/18/25 11:16   Resp 18 06/18/25 11:15   SpO2 96 % 06/18/25 11:16   Vitals shown include unfiled device data.        Post Anesthesia Care and Evaluation    Patient location during evaluation: PHASE II  Patient participation: complete - patient participated  Level of consciousness: awake and awake and alert  Pain score: 0  Pain management: adequate    Airway patency: patent  Anesthetic complications: No anesthetic complications  PONV Status: none  Cardiovascular status: acceptable  Respiratory status: acceptable  Hydration status: acceptable    Comments: Patient discharged according to acceptable Bessy score per RN assessment. See nursing records for further information.     Blood pressure 122/82, pulse 71, temperature 96 °F (35.6 °C), temperature source Temporal, resp. rate 18, height 182.9 cm (72\"), weight 105 kg (232 lb), SpO2 97%.      "

## 2025-06-19 ENCOUNTER — TELEPHONE (OUTPATIENT)
Dept: GASTROENTEROLOGY | Facility: CLINIC | Age: 70
End: 2025-06-19
Payer: MEDICARE

## (undated) DEVICE — CATH DIAG DXTERITY NOTO CRV 5F 100CM 0/SH

## (undated) DEVICE — DEFENDO AIR WATER SUCTION AND BIOPSY VALVE KIT FOR  OLYMPUS: Brand: DEFENDO AIR/WATER/SUCTION AND BIOPSY VALVE

## (undated) DEVICE — SOLIDIFIER LIQUI LOC PLUS 2000CC

## (undated) DEVICE — CATH F5 INF RCB 100CM: Brand: INFINITI

## (undated) DEVICE — PK CATH CARD 30 CA/4

## (undated) DEVICE — COPILOT BLEEDBACK CONTROL VALVE: Brand: COPILOT

## (undated) DEVICE — A2000 MULTI-USE SYRINGE KIT, P/N 701277-003KIT CONTENTS: 100ML CONTRAST RESERVOIR AND TUBING WITH CONTRAST SPIKE AND CLAMP: Brand: A2000 MULTI-USE SYRINGE KIT

## (undated) DEVICE — PERCLOSE™ PROSTYLE™ SUTURE-MEDIATED CLOSURE AND REPAIR SYSTEM: Brand: PERCLOSE™ PROSTYLE™

## (undated) DEVICE — GW GRAPHX PT 30CM

## (undated) DEVICE — ELECTRD PAD DEFIB A/

## (undated) DEVICE — MASK,OXYGEN,MED CONC,ADLT,7' TUB, UC: Brand: PENDING

## (undated) DEVICE — MODEL BT2000 P/N 700287-012KIT CONTENTS: MANIFOLD WITH SALINE AND CONTRAST PORTS, SALINE TUBING WITH SPIKE AND HAND SYRINGE, TRANSDUCER: Brand: BT2000 AUTOMATED MANIFOLD KIT

## (undated) DEVICE — YANKAUER,BULB TIP WITH VENT: Brand: ARGYLE

## (undated) DEVICE — MYNXGRIP 6F/7F: Brand: MYNXGRIP

## (undated) DEVICE — MODEL AT P65, P/N 701554-001KIT CONTENTS: HAND CONTROLLER, 3-WAY HIGH-PRESSURE STOPCOCK WITH ROTATING END AND PREMIUM HIGH-PRESSURE TUBING: Brand: ANGIOTOUCH® KIT

## (undated) DEVICE — VLV HEMO GUARDIAN INSRT/TOOL W TORQ DEV

## (undated) DEVICE — GW STARTER FXD CORE J .035 3X150CM 3MM

## (undated) DEVICE — SENSR O2 OXIMAX FNGR A/ 18IN NONSTR

## (undated) DEVICE — HI-TORQUE PILOT 200 GUIDE WIRE .014 STRAIGHT TIP 3.0 CM X 190 CM: Brand: HI-TORQUE PILOT

## (undated) DEVICE — FR5 INFINITI MULTIPAC: Brand: INFINITI

## (undated) DEVICE — TOOL INSRT GW MTL OR PLSTC

## (undated) DEVICE — PAD, DEFIB, ADULT, RADIOTRANS, PHYSIO: Brand: MEDLINE

## (undated) DEVICE — PINNACLE INTRODUCER SHEATH: Brand: PINNACLE

## (undated) DEVICE — CANN NASL ETCO2 LO/FLO A/

## (undated) DEVICE — GW FIGHTER STR TP 190CM

## (undated) DEVICE — EMBOLIC PROTECTION SYSTEM: Brand: FILTERWIRE EZ™

## (undated) DEVICE — MICROCATHETER: Brand: MAMBA™ FLEX

## (undated) DEVICE — GW GRAPHX .014 182CM

## (undated) DEVICE — SOL IRR NACL 0.9PCT BT 1000ML

## (undated) DEVICE — PTCA DILATATION CATHETER: Brand: EMERGE™

## (undated) DEVICE — THE CHANNEL CLEANING BRUSH IS A NYLON FLEXI BRUSH ATTACHED TO A FLEXIBLE PLASTIC SHEATH DESIGNED TO SAFELY REMOVE DEBRIS FROM FLEXIBLE ENDOSCOPES.

## (undated) DEVICE — DRSNG PRESS SAFEGUARD

## (undated) DEVICE — CATH F5 INF MP A2 100CM 2SH: Brand: INFINITI

## (undated) DEVICE — NC TREK CORONARY DILATATION CATHETER 3.0 MM X 12 MM / RAPID-EXCHANGE: Brand: NC TREK

## (undated) DEVICE — Device: Brand: DEFENDO AIR/WATER/SUCTION AND BIOPSY VALVE

## (undated) DEVICE — DEV BALN EXCHG TRAPPER 2MM

## (undated) DEVICE — 6F .070 XB 3.5 100CM: Brand: VISTA BRITE TIP

## (undated) DEVICE — CANN CO2/O2 NASL A/

## (undated) DEVICE — Device

## (undated) DEVICE — 6F .070 RCB 100CM: Brand: VISTA BRITE TIP

## (undated) DEVICE — ENDOGATOR AUXILIARY WATER JET CONNECTOR: Brand: ENDOGATOR

## (undated) DEVICE — CATH F5 INF JL 5 100CM: Brand: INFINITI

## (undated) DEVICE — 6F .070 MPA 1 100CM: Brand: VISTA BRITE TIP

## (undated) DEVICE — PTCA DILATATION CATHETER: Brand: EMERGE™ PUSH

## (undated) DEVICE — INFLATION DEVICE: Brand: ENCORE™ 26

## (undated) DEVICE — HI-TORQUE POWERTURN FLEX GUIDE WIRE W/HYDROPHILIC COATING .014" STRAIGHT TIP 190 CM: Brand: HI-TORQUE POWERTURN

## (undated) DEVICE — ARGYLE YANKAUER BULB TIP WITH VENT: Brand: ARGYLE

## (undated) DEVICE — TBG SMPL FLTR LINE NASL 02/C02 A/ BX/100